# Patient Record
Sex: FEMALE | Race: OTHER | Employment: UNEMPLOYED | URBAN - METROPOLITAN AREA
[De-identification: names, ages, dates, MRNs, and addresses within clinical notes are randomized per-mention and may not be internally consistent; named-entity substitution may affect disease eponyms.]

---

## 2007-08-31 LAB — PAP SMEAR, EXTERNAL: NEGATIVE

## 2019-01-07 LAB — HBA1C MFR BLD HPLC: 5.9 %

## 2019-11-06 ENCOUNTER — OFFICE VISIT (OUTPATIENT)
Dept: FAMILY MEDICINE CLINIC | Age: 64
End: 2019-11-06

## 2019-11-06 VITALS
SYSTOLIC BLOOD PRESSURE: 158 MMHG | BODY MASS INDEX: 23.05 KG/M2 | WEIGHT: 135 LBS | RESPIRATION RATE: 18 BRPM | OXYGEN SATURATION: 98 % | TEMPERATURE: 98 F | DIASTOLIC BLOOD PRESSURE: 93 MMHG | HEART RATE: 65 BPM | HEIGHT: 64 IN

## 2019-11-06 DIAGNOSIS — Z12.31 ENCOUNTER FOR SCREENING MAMMOGRAM FOR MALIGNANT NEOPLASM OF BREAST: ICD-10-CM

## 2019-11-06 DIAGNOSIS — I10 ESSENTIAL HYPERTENSION: Primary | ICD-10-CM

## 2019-11-06 DIAGNOSIS — R73.01 IFG (IMPAIRED FASTING GLUCOSE): ICD-10-CM

## 2019-11-06 DIAGNOSIS — E78.5 HYPERLIPIDEMIA, UNSPECIFIED HYPERLIPIDEMIA TYPE: ICD-10-CM

## 2019-11-06 RX ORDER — FENOFIBRATE 120 MG/1
145 TABLET ORAL
COMMUNITY
End: 2019-11-08

## 2019-11-06 RX ORDER — TELMISARTAN 20 MG/1
TABLET ORAL DAILY
COMMUNITY
End: 2019-11-06 | Stop reason: SDUPTHER

## 2019-11-06 RX ORDER — AMOXICILLIN 500 MG/1
CAPSULE ORAL
COMMUNITY
Start: 2019-10-30 | End: 2020-05-28 | Stop reason: ALTCHOICE

## 2019-11-06 NOTE — PROGRESS NOTES
Chief Complaint   Patient presents with    Hypertension    Cholesterol Problem    Establish Care    Labs     requested     HISTORY OF PRESENT ILLNESS  Homer Paz is a 71 y.o. female. HPI  Pt here to establish. Pt has hx of htn, hld. Pt requests labs. Pt needs an order for a mammogram.  She will go to [facility] No refills needed today. ROS  Review of Systems   Constitutional: Negative. HENT: Negative. Respiratory: Negative. Cardiovascular: Negative. All other systems reviewed and are negative. Physical Exam  Physical Exam   Nursing note and vitals reviewed. Constitutional: She is oriented to person, place, and time. She appears well-developed and well-nourished. HENT:   Head: Normocephalic and atraumatic. Right Ear: External ear normal.   Left Ear: External ear normal.   Nose: Nose normal.   Eyes: Conjunctivae and EOM are normal.   Neck: Normal range of motion. Neck supple. No JVD present. Carotid bruit is not present. No thyromegaly present. Cardiovascular: Normal rate, regular rhythm, normal heart sounds and intact distal pulses. Exam reveals no gallop and no friction rub. No murmur heard. Pulmonary/Chest: Effort normal and breath sounds normal. She has no wheezes. She has no rhonchi. She has no rales. Abdominal: Soft. Bowel sounds are normal.   Musculoskeletal: Normal range of motion. Neurological: She is alert and oriented to person, place, and time. Coordination normal.   Skin: Skin is warm and dry. Psychiatric: She has a normal mood and affect. Her behavior is normal. Judgment and thought content normal.     ASSESSMENT and PLAN  Diagnoses and all orders for this visit:    1. Essential hypertension  -     LIPID PANEL; Future  -     HEMOGLOBIN A1C WITH EAG; Future    2. Hyperlipidemia, unspecified hyperlipidemia type  -     METABOLIC PANEL, COMPREHENSIVE; Future  -     LIPID PANEL; Future    3.  Encounter for screening mammogram for malignant neoplasm of breast  -     JACOB 3D JHON W MAMMO BI SCREENING INCL CAD; Future    4. IFG (impaired fasting glucose)  -     METABOLIC PANEL, COMPREHENSIVE; Future  -     LIPID PANEL; Future  -     HEMOGLOBIN A1C WITH EAG; Future      Follow-up and Dispositions    · Return in about 2 months (around 1/6/2020) for high blood pressure, high cholesterol, elevated fasting blood sugar, lab review.

## 2019-11-06 NOTE — PROGRESS NOTES
Nadya Cruz presents today for   Chief Complaint   Patient presents with    Hypertension    Cholesterol Problem    Establish Care    Labs     requested       Is someone accompanying this pt? no    Is the patient using any DME equipment during OV? no    Depression Screening:  3 most recent PHQ Screens 11/6/2019   Little interest or pleasure in doing things Not at all   Feeling down, depressed, irritable, or hopeless Not at all   Total Score PHQ 2 0       Learning Assessment:  Learning Assessment 11/6/2019   PRIMARY LEARNER Patient   HIGHEST LEVEL OF EDUCATION - PRIMARY LEARNER  SOME COLLEGE   BARRIERS PRIMARY LEARNER NONE   CO-LEARNER CAREGIVER No   PRIMARY LANGUAGE ENGLISH   LEARNER PREFERENCE PRIMARY LISTENING   ANSWERED BY self   RELATIONSHIP SELF       Abuse Screening:  Abuse Screening Questionnaire 11/6/2019   Do you ever feel afraid of your partner? N   Are you in a relationship with someone who physically or mentally threatens you? N   Is it safe for you to go home? Y       Fall Screening  Fall Risk Assessment, last 12 mths 11/6/2019   Able to walk? Yes   Fall in past 12 months? No       Generalized Anxiety  No flowsheet data found. Health Maintenance Due   Topic Date Due    Hepatitis C Screening  1955    DTaP/Tdap/Td series (1 - Tdap) 10/22/1975    PAP AKA CERVICAL CYTOLOGY  10/22/1975    Shingrix Vaccine Age 50> (1 of 2) 10/22/2004    FOBT Q 1 YEAR AGE 50-75  10/22/2004    BREAST CANCER SCRN MAMMOGRAM  03/06/2017    Influenza Age 9 to Adult  08/01/2019    GLAUCOMA SCREENING Q2Y  10/22/2019    Bone Densitometry (Dexa) Screening  10/22/2019    Pneumococcal 65+ years (1 of 1 - PPSV23) 10/22/2019   . Health Maintenance reviewed and discussed and ordered per Provider. Nadya Cruz is updated on all     Coordination of Care  1. Have you been to the ER, urgent care clinic since your last visit? Hospitalized since your last visit? no    2.  Have you seen or consulted any other health care providers outside of the [address] [place] [address] since your last visit? Include any pap smears or colon screening. no      Advance Directive:  1. Do you have an advance directive in place? Patient Reply:no    2. If not, would you like material regarding how to put one in place?  Patient Reply: no

## 2019-11-06 NOTE — TELEPHONE ENCOUNTER
PCP: Mary Ellen Mike MD    Last appt: 11/6/2019  Future Appointments   Date Time Provider Lisandro Duran   1/9/2020 11:15 AM Mary Ellen Mike MD [facility] None       Requested Prescriptions     Pending Prescriptions Disp Refills    telmisartan (MICARDIS) 20 mg tablet       Sig: Take  by mouth daily.  Fenofibrate 120 mg tab       Sig: Take 145 mg by mouth.

## 2019-11-07 ENCOUNTER — HOSPITAL ENCOUNTER (OUTPATIENT)
Dept: LAB | Age: 64
Discharge: HOME | End: 2019-11-07
Payer: MEDICARE

## 2019-11-07 DIAGNOSIS — I10 ESSENTIAL HYPERTENSION: ICD-10-CM

## 2019-11-07 DIAGNOSIS — E78.5 HYPERLIPIDEMIA, UNSPECIFIED HYPERLIPIDEMIA TYPE: ICD-10-CM

## 2019-11-07 DIAGNOSIS — R73.01 IFG (IMPAIRED FASTING GLUCOSE): ICD-10-CM

## 2019-11-07 LAB
ALBUMIN SERPL-MCNC: 4 G/DL (ref 3.4–5)
ALBUMIN/GLOB SERPL: 1.3 {RATIO} (ref 0.8–1.7)
ALP SERPL-CCNC: 84 U/L (ref 45–117)
ALT SERPL-CCNC: 34 U/L (ref 13–56)
ANION GAP SERPL CALC-SCNC: 3 MMOL/L (ref 3–18)
AST SERPL-CCNC: 28 U/L (ref 10–38)
BILIRUB SERPL-MCNC: 0.4 MG/DL (ref 0.2–1)
BUN SERPL-MCNC: 26 MG/DL (ref 7–18)
BUN/CREAT SERPL: 24 (ref 12–20)
CALCIUM SERPL-MCNC: 10 MG/DL (ref 8.5–10.1)
CHLORIDE SERPL-SCNC: 112 MMOL/L (ref 100–111)
CHOLEST SERPL-MCNC: 193 MG/DL
CO2 SERPL-SCNC: 29 MMOL/L (ref 21–32)
CREAT SERPL-MCNC: 1.09 MG/DL (ref 0.6–1.3)
EST. AVERAGE GLUCOSE BLD GHB EST-MCNC: 126 MG/DL
GLOBULIN SER CALC-MCNC: 3.1 G/DL (ref 2–4)
GLUCOSE SERPL-MCNC: 95 MG/DL (ref 74–99)
HBA1C MFR BLD: 6 % (ref 4.2–5.6)
HDLC SERPL-MCNC: 49 MG/DL (ref 40–60)
HDLC SERPL: 3.9 {RATIO} (ref 0–5)
LDLC SERPL CALC-MCNC: 114.8 MG/DL (ref 0–100)
LIPID PROFILE,FLP: ABNORMAL
POTASSIUM SERPL-SCNC: 5.3 MMOL/L (ref 3.5–5.5)
PROT SERPL-MCNC: 7.1 G/DL (ref 6.4–8.2)
SODIUM SERPL-SCNC: 144 MMOL/L (ref 136–145)
TRIGL SERPL-MCNC: 146 MG/DL (ref ?–150)
VLDLC SERPL CALC-MCNC: 29.2 MG/DL

## 2019-11-07 PROCEDURE — 80061 LIPID PANEL: CPT

## 2019-11-07 PROCEDURE — 83036 HEMOGLOBIN GLYCOSYLATED A1C: CPT

## 2019-11-07 PROCEDURE — 36415 COLL VENOUS BLD VENIPUNCTURE: CPT

## 2019-11-07 PROCEDURE — 80053 COMPREHEN METABOLIC PANEL: CPT

## 2019-11-08 RX ORDER — FENOFIBRATE 145 MG/1
145 TABLET, COATED ORAL DAILY
Qty: 90 TAB | Refills: 1 | Status: SHIPPED | OUTPATIENT
Start: 2019-11-08 | End: 2020-05-28 | Stop reason: SDUPTHER

## 2019-11-08 RX ORDER — FENOFIBRATE 120 MG/1
145 TABLET ORAL
Status: CANCELLED | OUTPATIENT
Start: 2019-11-08

## 2019-11-08 RX ORDER — TELMISARTAN 20 MG/1
20 TABLET ORAL DAILY
Qty: 90 TAB | Refills: 1 | Status: SHIPPED | OUTPATIENT
Start: 2019-11-08 | End: 2020-05-28 | Stop reason: SDUPTHER

## 2020-01-09 ENCOUNTER — HOSPITAL ENCOUNTER (OUTPATIENT)
Dept: LAB | Age: 65
Discharge: HOME | End: 2020-01-09
Payer: MEDICARE

## 2020-01-09 ENCOUNTER — OFFICE VISIT (OUTPATIENT)
Dept: FAMILY MEDICINE CLINIC | Age: 65
End: 2020-01-09

## 2020-01-09 VITALS
DIASTOLIC BLOOD PRESSURE: 79 MMHG | HEART RATE: 81 BPM | HEIGHT: 64 IN | WEIGHT: 135.2 LBS | BODY MASS INDEX: 23.08 KG/M2 | SYSTOLIC BLOOD PRESSURE: 146 MMHG | TEMPERATURE: 97.8 F | RESPIRATION RATE: 18 BRPM

## 2020-01-09 DIAGNOSIS — R25.2 LEG CRAMPING: ICD-10-CM

## 2020-01-09 DIAGNOSIS — E78.5 HYPERLIPIDEMIA, UNSPECIFIED HYPERLIPIDEMIA TYPE: ICD-10-CM

## 2020-01-09 DIAGNOSIS — Z23 ENCOUNTER FOR IMMUNIZATION: ICD-10-CM

## 2020-01-09 DIAGNOSIS — R73.01 IFG (IMPAIRED FASTING GLUCOSE): ICD-10-CM

## 2020-01-09 DIAGNOSIS — I10 ESSENTIAL HYPERTENSION: ICD-10-CM

## 2020-01-09 DIAGNOSIS — R73.01 IFG (IMPAIRED FASTING GLUCOSE): Primary | ICD-10-CM

## 2020-01-09 LAB
ANION GAP SERPL CALC-SCNC: 5 MMOL/L (ref 3–18)
BUN SERPL-MCNC: 21 MG/DL (ref 7–18)
BUN/CREAT SERPL: 21 (ref 12–20)
CALCIUM SERPL-MCNC: 10.4 MG/DL (ref 8.5–10.1)
CHLORIDE SERPL-SCNC: 109 MMOL/L (ref 100–111)
CO2 SERPL-SCNC: 29 MMOL/L (ref 21–32)
CREAT SERPL-MCNC: 1 MG/DL (ref 0.6–1.3)
GLUCOSE SERPL-MCNC: 88 MG/DL (ref 74–99)
POTASSIUM SERPL-SCNC: 4.7 MMOL/L (ref 3.5–5.5)
SODIUM SERPL-SCNC: 143 MMOL/L (ref 136–145)

## 2020-01-09 PROCEDURE — 36415 COLL VENOUS BLD VENIPUNCTURE: CPT

## 2020-01-09 PROCEDURE — 80048 BASIC METABOLIC PNL TOTAL CA: CPT

## 2020-01-09 RX ORDER — ASCORBIC ACID 250 MG
TABLET ORAL
COMMUNITY
End: 2021-06-01 | Stop reason: ALTCHOICE

## 2020-01-09 RX ORDER — AA/PROT/LYSINE/METHIO/VIT C/B6 50-12.5 MG
100 TABLET ORAL
COMMUNITY

## 2020-01-09 RX ORDER — BISMUTH SUBSALICYLATE 262 MG
1 TABLET,CHEWABLE ORAL DAILY
COMMUNITY

## 2020-01-09 NOTE — PATIENT INSTRUCTIONS
Vaccine Information Statement    Influenza (Flu) Vaccine (Inactivated or Recombinant): What You Need to Know    Many Vaccine Information Statements are available in Vietnamese and other languages. See www.immunize.org/vis  Hojas de información sobre vacunas están disponibles en español y en muchos otros idiomas. Visite www.immunize.org/vis    1. Why get vaccinated? Influenza vaccine can prevent influenza (flu). Flu is a contagious disease that spreads around the [facility] every year, usually between October and May. Anyone can get the flu, but it is more dangerous for some people. Infants and young children, people 72years of age and older, pregnant women, and people with certain health conditions or a weakened immune system are at greatest risk of flu complications. Pneumonia, bronchitis, sinus infections and ear infections are examples of flu-related complications. If you have a medical condition, such as heart disease, cancer or diabetes, flu can make it worse. Flu can cause fever and chills, sore throat, muscle aches, fatigue, cough, headache, and runny or stuffy nose. Some people may have vomiting and diarrhea, though this is more common in children than adults. Each year thousands of people in the [facility] die from flu, and many more are hospitalized. Flu vaccine prevents millions of illnesses and flu-related visits to the doctor each year. 2. Influenza vaccines     CDC recommends everyone 10months of age and older get vaccinated every flu season. Children 6 months through 6years of age may need 2 doses during a single flu season. Everyone else needs only 1 dose each flu season. It takes about 2 weeks for protection to develop after vaccination. There are many flu viruses, and they are always changing. Each year a new flu vaccine is made to protect against three or four viruses that are likely to cause disease in the upcoming flu season.  Even when the vaccine doesnt exactly match these viruses, it may still provide some protection. Influenza vaccine does not cause flu. Influenza vaccine may be given at the same time as other vaccines. 3. Talk with your health care provider    Tell your vaccine provider if the person getting the vaccine:   Has had an allergic reaction after a previous dose of influenza vaccine, or has any severe, life-threatening allergies.  Has ever had Guillain-Barré Syndrome (also called GBS). In some cases, your health care provider may decide to postpone influenza vaccination to a future visit. People with minor illnesses, such as a cold, may be vaccinated. People who are moderately or severely ill should usually wait until they recover before getting influenza vaccine. Your health care provider can give you more information. 4. Risks of a reaction     Soreness, redness, and swelling where shot is given, fever, muscle aches, and headache can happen after influenza vaccine.  There may be a very small increased risk of Guillain-Barré Syndrome (GBS) after inactivated influenza vaccine (the flu shot). Vilma Paul children who get the flu shot along with pneumococcal vaccine (PCV13), and/or DTaP vaccine at the same time might be slightly more likely to have a seizure caused by fever. Tell your health care provider if a child who is getting flu vaccine has ever had a seizure. People sometimes faint after medical procedures, including vaccination. Tell your provider if you feel dizzy or have vision changes or ringing in the ears. As with any medicine, there is a very remote chance of a vaccine causing a severe allergic reaction, other serious injury, or death. 5. What if there is a serious problem? An allergic reaction could occur after the vaccinated person leaves the clinic.  If you see signs of a severe allergic reaction (hives, swelling of the face and throat, difficulty breathing, a fast heartbeat, dizziness, or weakness), call 9-1-1 and get the person to the nearest hospital.    For other signs that concern you, call your health care provider. Adverse reactions should be reported to the Vaccine Adverse Event Reporting System (VAERS). Your health care provider will usually file this report, or you can do it yourself. Visit the VAERS website at www.vaers. [facility].gov or call 5-463.821.2871. VAERS is only for reporting reactions, and VAERS staff do not give medical advice. 6. The National Vaccine Injury Compensation Program    The formerly [facility] Vaccine Injury Compensation Program (VICP) is a federal program that was created to compensate people who may have been injured by certain vaccines. Visit the VICP website at www.hrsa.gov/vaccinecompensation or call 8-324.932.8358 to learn about the program and about filing a claim. There is a time limit to file a claim for compensation. 7. How can I learn more?  Ask your health care provider.  Call your local or state health department.  Contact the Centers for Disease Control and Prevention (CDC):  - Call 6-980.663.3038 (1-800-CDC-INFO) or  - Visit CDCs influenza website at www.cdc.gov/flu    Vaccine Information Statement (Interim)  Inactivated Influenza Vaccine   8/15/2019  [address] [id number]-[id number]   Department of Health and Human Services  Centers for Disease Control and Prevention    Office Use Only

## 2020-01-09 NOTE — PROGRESS NOTES
Chief Complaint   Patient presents with    Hypertension     follow up    Cholesterol Problem    Blood sugar problem    Labs     completed 11/7/19    Leg Pain     Patient stated that she been having right leg soreness. GLORIA Rico is a 71 y.o. female presenting today for 2 months  follow up of htn, hld, ifg.    Patient had labs on 11/7/19. Labs reviewed in detail with patient     Patient does not need medication refills today. New concerns today: pt reports that she had cramps in one leg about 2 wks ago. It did go away but the muscles were sore for a few days afterwards. ROS  Review of Systems   Constitutional: Negative. HENT: Negative. Respiratory: Negative. Cardiovascular: Negative. All other systems reviewed and are negative. Physical Exam  Physical Exam   Nursing note and vitals reviewed. Constitutional: She is oriented to person, place, and time. She appears well-developed and well-nourished. HENT:   Head: Normocephalic and atraumatic. Right Ear: External ear normal.   Left Ear: External ear normal.   Nose: Nose normal.   Eyes: Conjunctivae and EOM are normal.   Neck: Normal range of motion. Neck supple. No JVD present. Carotid bruit is not present. No thyromegaly present. Cardiovascular: Normal rate, regular rhythm, normal heart sounds and intact distal pulses. Exam reveals no gallop and no friction rub. No murmur heard. Pulmonary/Chest: Effort normal and breath sounds normal. She has no wheezes. She has no rhonchi. She has no rales. Abdominal: Soft. Bowel sounds are normal.   Musculoskeletal: Normal range of motion. Neurological: She is alert and oriented to person, place, and time. Coordination normal.   Skin: Skin is warm and dry. Psychiatric: She has a normal mood and affect. Her behavior is normal. Judgment and thought content normal.     Diagnoses and all orders for this visit:    1.  IFG (impaired fasting glucose)  -     METABOLIC PANEL, BASIC; Future  Stable. Continue present treatment plan    2. Essential hypertension  -     METABOLIC PANEL, BASIC; Future  Stable. Continue present treatment plan    3. Hyperlipidemia, unspecified hyperlipidemia type  -     METABOLIC PANEL, BASIC; Future  Stable. Continue present treatment plan    4. Encounter for immunization  -     INFLUENZA VACCINE INACTIVATED (IIV), SUBUNIT, ADJUVANTED, IM  -     ADMIN INFLUENZA VIRUS VAC    5. Leg cramping  Resolved  -     METABOLIC PANEL, BASIC; Future      Follow-up and Dispositions    · Return in about 3 months (around 4/9/2020) for elevated fasting blood sugar, high blood pressure, high cholesterol.

## 2020-01-09 NOTE — PROGRESS NOTES
[facility] presents today for   Chief Complaint   Patient presents with    Hypertension     follow up    Cholesterol Problem    Blood sugar problem    Labs     completed 11/7/19    Leg Pain     Patient stated that she been having right leg soreness. Is someone accompanying this pt? no    Is the patient using any DME equipment during [address]? no    Depression Screening:  3 most recent PHQ Screens 1/9/2020   Little interest or pleasure in doing things Not at all   Feeling down, depressed, irritable, or hopeless Not at all   Total Score PHQ 2 0       Learning Assessment:  Learning Assessment 1/9/2020   PRIMARY LEARNER Patient   HIGHEST LEVEL OF EDUCATION - PRIMARY LEARNER  SOME COLLEGE   BARRIERS PRIMARY LEARNER NONE   CO-LEARNER CAREGIVER No   PRIMARY LANGUAGE ENGLISH   LEARNER PREFERENCE PRIMARY LISTENING   ANSWERED BY self   RELATIONSHIP SELF       Abuse Screening:  Abuse Screening Questionnaire 1/9/2020   Do you ever feel afraid of your partner? N   Are you in a relationship with someone who physically or mentally threatens you? N   Is it safe for you to go home? Y       Fall Screening  Fall Risk Assessment, last 12 mths 1/9/2020   Able to walk? Yes   Fall in past 12 months? No       Generalized Anxiety  No flowsheet data found. Health Maintenance Due   Topic Date Due    Hepatitis C Screening  1955    COLONOSCOPY  10/22/1972    Shingrix Vaccine Age 50> (1 of 2) 10/22/2004    Influenza Age 5 to Adult  08/01/2019    GLAUCOMA SCREENING Q2Y  10/22/2019    Bone Densitometry (Dexa) Screening  10/22/2019    Pneumococcal 65+ years (1 of 1 - PPSV23) 10/22/2019    MEDICARE YEARLY EXAM  11/06/2019   . Health Maintenance reviewed and discussed and ordered per Provider. [facility] is updated on all     Coordination of Care  1. Have you been to the ER, urgent care clinic since your last visit? Hospitalized since your last visit? no    2.  Have you seen or consulted any other health care providers outside of the [address] [place] [address] since your last visit? Include any pap smears or colon screening. no      Advance Directive:  1. Do you have an advance directive in place? Patient Reply:no    2. If not, would you like material regarding how to put one in place? Patient Reply: farida    Felice Charleston 1955 female who presents for routine immunizations. Patient denies any symptoms , reactions or allergies that would exclude them from being immunized today. Risks and adverse reactions were discussed and the VIS was given to them. All questions were addressed. Order placed for HD Flu,  per Verbal Order from Dr. Wally Hooper with read back. Patient was observed for 15 min post injection. There were no reactions observed.     Kathi Roche

## 2020-01-29 ENCOUNTER — PATIENT MESSAGE (OUTPATIENT)
Dept: FAMILY MEDICINE CLINIC | Age: 65
End: 2020-01-29

## 2020-02-04 NOTE — TELEPHONE ENCOUNTER
From: Candy Ace  To: Elin Thorpe MD  Sent: 1/29/2020 5:49 PM EST  Subject: Test Results Question    I had additional blood work done at my last visit because my electrolytes were high. Will the results be posted on here? Thank you.

## 2020-05-26 RX ORDER — TELMISARTAN 20 MG/1
20 TABLET ORAL DAILY
Qty: 90 TAB | Refills: 1 | Status: CANCELLED | OUTPATIENT
Start: 2020-05-26

## 2020-05-26 RX ORDER — FENOFIBRATE 145 MG/1
145 TABLET, COATED ORAL DAILY
Qty: 90 TAB | Refills: 1 | Status: CANCELLED | OUTPATIENT
Start: 2020-05-26

## 2020-05-26 NOTE — TELEPHONE ENCOUNTER
Pt called requesting refill for medication . Requested Prescriptions     Pending Prescriptions Disp Refills    telmisartan (MICARDIS) 20 mg tablet 90 Tab 1     Sig: Take 1 Tab by mouth daily.  fenofibrate nanocrystallized (TRICOR) 145 mg tablet 90 Tab 1     Sig: Take 1 Tab by mouth daily.     Pt would like printed copy and would like 90 day supply

## 2020-05-28 ENCOUNTER — VIRTUAL VISIT (OUTPATIENT)
Dept: FAMILY MEDICINE CLINIC | Age: 65
End: 2020-05-28

## 2020-05-28 DIAGNOSIS — N95.9 MENOPAUSAL AND PERIMENOPAUSAL DISORDER: ICD-10-CM

## 2020-05-28 DIAGNOSIS — Z11.59 ENCOUNTER FOR HEPATITIS C SCREENING TEST FOR LOW RISK PATIENT: ICD-10-CM

## 2020-05-28 DIAGNOSIS — I10 ESSENTIAL HYPERTENSION: ICD-10-CM

## 2020-05-28 DIAGNOSIS — E78.5 HYPERLIPIDEMIA, UNSPECIFIED HYPERLIPIDEMIA TYPE: ICD-10-CM

## 2020-05-28 DIAGNOSIS — R73.01 IFG (IMPAIRED FASTING GLUCOSE): Primary | ICD-10-CM

## 2020-05-28 RX ORDER — FENOFIBRATE 145 MG/1
145 TABLET, COATED ORAL DAILY
Qty: 90 TAB | Refills: 1 | Status: SHIPPED | OUTPATIENT
Start: 2020-05-28 | End: 2020-12-02 | Stop reason: SDUPTHER

## 2020-05-28 RX ORDER — TELMISARTAN 20 MG/1
20 TABLET ORAL DAILY
Qty: 90 TAB | Refills: 1 | Status: SHIPPED | OUTPATIENT
Start: 2020-05-28 | End: 2020-11-12 | Stop reason: SDUPTHER

## 2020-05-28 NOTE — PROGRESS NOTES
Reed Vásquez presents today for   Chief Complaint   Patient presents with    Blood sugar problem     elevated fasting blood glucose    Hypertension    Cholesterol Problem     high chol       Is someone accompanying this pt? no    Is the patient using any DME equipment during OV? no    Depression Screening:  3 most recent PHQ Screens 1/9/2020   Little interest or pleasure in doing things Not at all   Feeling down, depressed, irritable, or hopeless Not at all   Total Score PHQ 2 0       Learning Assessment:  Learning Assessment 1/9/2020   PRIMARY LEARNER Patient   HIGHEST LEVEL OF EDUCATION - PRIMARY LEARNER  SOME COLLEGE   BARRIERS PRIMARY LEARNER NONE   CO-LEARNER CAREGIVER No   PRIMARY LANGUAGE ENGLISH   LEARNER PREFERENCE PRIMARY LISTENING   ANSWERED BY self   RELATIONSHIP SELF       Abuse Screening:  Abuse Screening Questionnaire 1/9/2020   Do you ever feel afraid of your partner? N   Are you in a relationship with someone who physically or mentally threatens you? N   Is it safe for you to go home? Y       Fall Screening  Fall Risk Assessment, last 12 mths 1/9/2020   Able to walk? Yes   Fall in past 12 months? No       Generalized Anxiety  No flowsheet data found. Health Maintenance Due   Topic Date Due    Hepatitis C Screening  1955    Colonoscopy  10/22/1972    Shingrix Vaccine Age 50> (1 of 2) 10/22/2004    GLAUCOMA SCREENING Q2Y  10/22/2019    Bone Densitometry (Dexa) Screening  10/22/2019    Pneumococcal 65+ years (1 of 1 - PPSV23) 10/22/2019    Medicare Yearly Exam  11/06/2019   . Health Maintenance reviewed and discussed and ordered per Provider. Coordination of Care  1. Have you been to the ER, urgent care clinic since your last visit? Hospitalized since your last visit? no    2. Have you seen or consulted any other health care providers outside of the [address] [place] [address] since your last visit? Include any pap smears or colon screening.  no      Advance Directive: Discussed 1/9/20

## 2020-05-28 NOTE — PROGRESS NOTES
Renee Valdez is a 71 y.o. female who was seen by synchronous (real-time) audio-video technology on 5/28/2020. Consent: Renee Valdez, who was seen by synchronous (real-time) audio-video technology, and/or her healthcare decision maker, is aware that this patient-initiated, Telehealth encounter on 5/28/2020 is a billable service, with coverage as determined by her insurance carrier. She is aware that she may receive a bill and has provided verbal consent to proceed: Yes. Assessment & Plan:   Diagnoses and all orders for this visit:    1. IFG (impaired fasting glucose)  -     METABOLIC PANEL, COMPREHENSIVE; Future  -     LIPID PANEL; Future  -     HEMOGLOBIN A1C WITH EAG; Future  Stable, cont pres tx plan. 2. Essential hypertension  -     telmisartan (MICARDIS) 20 mg tablet; Take 1 Tab by mouth daily.  -     METABOLIC PANEL, COMPREHENSIVE; Future  -     LIPID PANEL; Future  Stable, cont pres tx plan. 3. Hyperlipidemia, unspecified hyperlipidemia type  -     fenofibrate nanocrystallized (TRICOR) 145 mg tablet; Take 1 Tab by mouth daily.  -     METABOLIC PANEL, COMPREHENSIVE; Future  -     LIPID PANEL; Future    4. Menopausal and perimenopausal disorder  -     DEXA BONE DENSITY STUDY AXIAL; Future    5. Encounter for hepatitis C screening test for low risk patient  -     HEPATITIS C AB; Future      The complexity of medical decision making for this visit is moderate   Follow-up and Dispositions    · Return in about 3 months (around 8/28/2020) for elevated fasting blood sugar, high blood pressure, high cholesterol. 712  Subjective:   Renee Valdez is a 71 y.o. female who was seen for Blood sugar problem (elevated fasting blood glucose); Hypertension; and Cholesterol Problem (high chol)    Patient contacted via doxy. me. Pt has been doing well. Her home bp readings have been normotensive. She does occasionally check bs reading and they are usually in the 80s.   They will occasionally get in the 90s; she tries to be careful with her diet. Pt does need med refills. Prior to Admission medications    Medication Sig Start Date End Date Taking? Authorizing Provider   fenofibrate nanocrystallized (TRICOR) 145 mg tablet Take 1 Tab by mouth daily. 5/28/20  Yes Teresita Oliva MD   telmisartan (MICARDIS) 20 mg tablet Take 1 Tab by mouth daily. 5/28/20  Yes Devorah Verdugo MD   coenzyme q10 (CO Q-10) 10 mg cap Take  by mouth. Yes Provider, Historical   multivitamin (ONE A DAY) tablet Take 1 Tab by mouth daily. Yes Provider, Historical   ascorbic acid, vitamin C, (VITAMIN C) 250 mg tablet Take  by mouth. Yes Provider, Historical   aspirin 81 mg tablet Take 81 mg by mouth. Yes Astrid, MD Isaiah   ACETAMINOPHEN (TYLENOL PO) Take  by mouth. Isaiah Resendiz MD   ibuprofen (ADVIL) 200 mg tablet Take 200 mg by mouth. Isaiah Resendiz MD   IBUPROFEN/DIPHENHYDRAMINE (ADVIL PM PO) Take  by mouth. Astrid, MD Isaiah     No Known Allergies    Patient Active Problem List   Diagnosis Code    Gastroenteritis K52.9    Dehydration E86.0     Current Outpatient Medications   Medication Sig Dispense Refill    fenofibrate nanocrystallized (TRICOR) 145 mg tablet Take 1 Tab by mouth daily. 90 Tab 1    telmisartan (MICARDIS) 20 mg tablet Take 1 Tab by mouth daily. 90 Tab 1    coenzyme q10 (CO Q-10) 10 mg cap Take  by mouth.  multivitamin (ONE A DAY) tablet Take 1 Tab by mouth daily.  ascorbic acid, vitamin C, (VITAMIN C) 250 mg tablet Take  by mouth.  aspirin 81 mg tablet Take 81 mg by mouth.  ACETAMINOPHEN (TYLENOL PO) Take  by mouth.  ibuprofen (ADVIL) 200 mg tablet Take 200 mg by mouth.  IBUPROFEN/DIPHENHYDRAMINE (ADVIL PM PO) Take  by mouth.          No Known Allergies  Past Medical History:   Diagnosis Date    High cholesterol     Hypertension      Past Surgical History:   Procedure Laterality Date    BREAST SURGERY PROCEDURE UNLISTED  2000    right lumpectomy    HX BLADDER SUSPENSION      HX GYN      hysterectomy    HX OOPHORECTOMY Left      Family History   Problem Relation Age of Onset    Dementia Mother     Hypertension Mother     High Cholesterol Mother     Other Mother         pre-dm    No Known Problems Son     No Known Problems Son     Other Father         homicide    Other Sister         AIDS    No Known Problems Brother     COPD Sister     No Known Problems Sister      Social History     Tobacco Use    Smoking status: Never Smoker    Smokeless tobacco: Never Used   Substance Use Topics    Alcohol use: Yes     Alcohol/week: 1.7 standard drinks     Types: 2 Glasses of wine per week     Frequency: Monthly or less     Drinks per session: 1 or 2     Binge frequency: Less than monthly       Review of Systems   Constitutional: Negative. HENT: Negative. Respiratory: Negative. Cardiovascular: Negative. All other systems reviewed and are negative. Objective: There were no vitals taken for this visit. General: alert, cooperative, no distress   Mental  status: normal mood, behavior, speech, dress, motor activity, and thought processes, able to follow commands   HENT: NCAT   Neck: no visualized mass   Resp: no respiratory distress   Neuro: no gross deficits   Skin: no discoloration or lesions of concern on visible areas   Psychiatric: normal affect, consistent with stated mood, no evidence of hallucinations     Additional exam findings: none    We discussed the expected course, resolution and complications of the diagnosis(es) in detail. Medication risks, benefits, costs, interactions, and alternatives were discussed as indicated. I advised her to contact the office if her condition worsens, changes or fails to improve as anticipated. She expressed understanding with the diagnosis(es) and plan. Tatyana Sharpe is a 71 y.o. female who was evaluated by a video visit encounter for concerns as above.  Patient identification was verified prior to start of the visit. A caregiver was present when appropriate. Due to this being a TeleHealth encounter (During XRPEV-46 public health emergency), evaluation of the following organ systems was limited: Vitals/Constitutional/EENT/Resp/CV/GI//MS/Neuro/Skin/Heme-Lymph-Imm. Pursuant to the emergency declaration under the [facility], [facility] waiver authority and the [employer] and [employer] Act, this Virtual  Visit was conducted, with patient's (and/or legal guardian's) consent, to reduce the patient's risk of exposure to COVID-19 and provide necessary medical care. Services were provided through a video synchronous discussion virtually to substitute for in-person clinic visit. Patient and provider were located at their individual homes.       Archana Gonzalez MD

## 2020-05-29 ENCOUNTER — HOSPITAL ENCOUNTER (OUTPATIENT)
Dept: LAB | Age: 65
Discharge: HOME | End: 2020-05-29
Payer: MEDICARE

## 2020-05-29 DIAGNOSIS — R73.01 IFG (IMPAIRED FASTING GLUCOSE): ICD-10-CM

## 2020-05-29 DIAGNOSIS — I10 ESSENTIAL HYPERTENSION: ICD-10-CM

## 2020-05-29 DIAGNOSIS — Z11.59 ENCOUNTER FOR HEPATITIS C SCREENING TEST FOR LOW RISK PATIENT: ICD-10-CM

## 2020-05-29 DIAGNOSIS — E78.5 HYPERLIPIDEMIA, UNSPECIFIED HYPERLIPIDEMIA TYPE: ICD-10-CM

## 2020-05-29 LAB
ALBUMIN SERPL-MCNC: 4.1 G/DL (ref 3.4–5)
ALBUMIN/GLOB SERPL: 1.3 {RATIO} (ref 0.8–1.7)
ALP SERPL-CCNC: 96 U/L (ref 45–117)
ALT SERPL-CCNC: 34 U/L (ref 13–56)
ANION GAP SERPL CALC-SCNC: 4 MMOL/L (ref 3–18)
AST SERPL-CCNC: 27 U/L (ref 10–38)
BILIRUB SERPL-MCNC: 0.4 MG/DL (ref 0.2–1)
BUN SERPL-MCNC: 20 MG/DL (ref 7–18)
BUN/CREAT SERPL: 18 (ref 12–20)
CALCIUM SERPL-MCNC: 9.5 MG/DL (ref 8.5–10.1)
CHLORIDE SERPL-SCNC: 110 MMOL/L (ref 100–111)
CHOLEST SERPL-MCNC: 204 MG/DL
CO2 SERPL-SCNC: 29 MMOL/L (ref 21–32)
CREAT SERPL-MCNC: 1.09 MG/DL (ref 0.6–1.3)
EST. AVERAGE GLUCOSE BLD GHB EST-MCNC: 128 MG/DL
GLOBULIN SER CALC-MCNC: 3.1 G/DL (ref 2–4)
GLUCOSE SERPL-MCNC: 93 MG/DL (ref 74–99)
HBA1C MFR BLD: 6.1 % (ref 4.2–5.6)
HCV AB SER IA-ACNC: 0.14 INDEX
HCV AB SERPL QL IA: NEGATIVE
HCV COMMENT,HCGAC: NORMAL
HDLC SERPL-MCNC: 49 MG/DL (ref 40–60)
HDLC SERPL: 4.2 {RATIO} (ref 0–5)
LDLC SERPL CALC-MCNC: 124.2 MG/DL (ref 0–100)
LIPID PROFILE,FLP: ABNORMAL
POTASSIUM SERPL-SCNC: 4.1 MMOL/L (ref 3.5–5.5)
PROT SERPL-MCNC: 7.2 G/DL (ref 6.4–8.2)
SODIUM SERPL-SCNC: 143 MMOL/L (ref 136–145)
TRIGL SERPL-MCNC: 154 MG/DL (ref ?–150)
VLDLC SERPL CALC-MCNC: 30.8 MG/DL

## 2020-05-29 PROCEDURE — 80053 COMPREHEN METABOLIC PANEL: CPT

## 2020-05-29 PROCEDURE — 80061 LIPID PANEL: CPT

## 2020-05-29 PROCEDURE — 86803 HEPATITIS C AB TEST: CPT

## 2020-05-29 PROCEDURE — 36415 COLL VENOUS BLD VENIPUNCTURE: CPT

## 2020-05-29 PROCEDURE — 83036 HEMOGLOBIN GLYCOSYLATED A1C: CPT

## 2020-06-16 ENCOUNTER — HOSPITAL ENCOUNTER (OUTPATIENT)
Dept: BONE DENSITY | Age: 65
Discharge: HOME | End: 2020-06-16
Attending: FAMILY MEDICINE
Payer: MEDICARE

## 2020-06-16 DIAGNOSIS — N95.9 MENOPAUSAL AND PERIMENOPAUSAL DISORDER: ICD-10-CM

## 2020-06-16 PROCEDURE — 77080 DXA BONE DENSITY AXIAL: CPT

## 2020-11-12 DIAGNOSIS — R73.01 IFG (IMPAIRED FASTING GLUCOSE): Primary | ICD-10-CM

## 2020-11-12 DIAGNOSIS — I10 ESSENTIAL HYPERTENSION: ICD-10-CM

## 2020-11-12 DIAGNOSIS — E78.5 HYPERLIPIDEMIA, UNSPECIFIED HYPERLIPIDEMIA TYPE: ICD-10-CM

## 2020-11-12 NOTE — TELEPHONE ENCOUNTER
Pt called requesting refill for medication . Requested Prescriptions     Pending Prescriptions Disp Refills    telmisartan (MICARDIS) 20 mg tablet 90 Tab 1     Sig: Take 1 Tab by mouth daily. Pt also has and follow up appt on scheduled on Dec 2nd with SINGH Lemus and would like to have blood work ordered. Please advise.

## 2020-11-13 RX ORDER — TELMISARTAN 20 MG/1
20 TABLET ORAL DAILY
Qty: 90 TAB | Refills: 1 | Status: SHIPPED | OUTPATIENT
Start: 2020-11-13 | End: 2021-05-10 | Stop reason: SDUPTHER

## 2020-11-16 ENCOUNTER — HOSPITAL ENCOUNTER (OUTPATIENT)
Dept: LAB | Age: 65
Discharge: HOME | End: 2020-11-16
Payer: MEDICARE

## 2020-11-16 DIAGNOSIS — R73.01 IFG (IMPAIRED FASTING GLUCOSE): ICD-10-CM

## 2020-11-16 DIAGNOSIS — E78.5 HYPERLIPIDEMIA, UNSPECIFIED HYPERLIPIDEMIA TYPE: ICD-10-CM

## 2020-11-16 DIAGNOSIS — I10 ESSENTIAL HYPERTENSION: ICD-10-CM

## 2020-11-16 LAB
ALBUMIN SERPL-MCNC: 4.2 G/DL (ref 3.4–5)
ALBUMIN/GLOB SERPL: 1.3 {RATIO} (ref 0.8–1.7)
ALP SERPL-CCNC: 99 U/L (ref 45–117)
ALT SERPL-CCNC: 32 U/L (ref 13–56)
ANION GAP SERPL CALC-SCNC: 5 MMOL/L (ref 3–18)
AST SERPL-CCNC: 20 U/L (ref 10–38)
BILIRUB SERPL-MCNC: 0.5 MG/DL (ref 0.2–1)
BUN SERPL-MCNC: 26 MG/DL (ref 7–18)
BUN/CREAT SERPL: 23 (ref 12–20)
CALCIUM SERPL-MCNC: 10 MG/DL (ref 8.5–10.1)
CHLORIDE SERPL-SCNC: 111 MMOL/L (ref 100–111)
CHOLEST SERPL-MCNC: 225 MG/DL
CO2 SERPL-SCNC: 28 MMOL/L (ref 21–32)
CREAT SERPL-MCNC: 1.12 MG/DL (ref 0.6–1.3)
EST. AVERAGE GLUCOSE BLD GHB EST-MCNC: 126 MG/DL
GLOBULIN SER CALC-MCNC: 3.3 G/DL (ref 2–4)
GLUCOSE SERPL-MCNC: 93 MG/DL (ref 74–99)
HBA1C MFR BLD: 6 % (ref 4.2–5.6)
HDLC SERPL-MCNC: 47 MG/DL (ref 40–60)
HDLC SERPL: 4.8 {RATIO} (ref 0–5)
LDLC SERPL CALC-MCNC: 138.2 MG/DL (ref 0–100)
LIPID PROFILE,FLP: ABNORMAL
POTASSIUM SERPL-SCNC: 4.4 MMOL/L (ref 3.5–5.5)
PROT SERPL-MCNC: 7.5 G/DL (ref 6.4–8.2)
SODIUM SERPL-SCNC: 144 MMOL/L (ref 136–145)
TRIGL SERPL-MCNC: 199 MG/DL (ref ?–150)
VLDLC SERPL CALC-MCNC: 39.8 MG/DL

## 2020-11-16 PROCEDURE — 36415 COLL VENOUS BLD VENIPUNCTURE: CPT

## 2020-11-16 PROCEDURE — 80053 COMPREHEN METABOLIC PANEL: CPT

## 2020-11-16 PROCEDURE — 80061 LIPID PANEL: CPT

## 2020-11-16 PROCEDURE — 83036 HEMOGLOBIN GLYCOSYLATED A1C: CPT

## 2020-12-02 ENCOUNTER — VIRTUAL VISIT (OUTPATIENT)
Dept: FAMILY MEDICINE CLINIC | Age: 65
End: 2020-12-02
Payer: MEDICARE

## 2020-12-02 DIAGNOSIS — I10 ESSENTIAL HYPERTENSION: ICD-10-CM

## 2020-12-02 DIAGNOSIS — N28.9 KIDNEY INSUFFICIENCY: ICD-10-CM

## 2020-12-02 DIAGNOSIS — E78.5 HYPERLIPIDEMIA, UNSPECIFIED HYPERLIPIDEMIA TYPE: ICD-10-CM

## 2020-12-02 DIAGNOSIS — E78.5 HYPERLIPIDEMIA, UNSPECIFIED HYPERLIPIDEMIA TYPE: Primary | ICD-10-CM

## 2020-12-02 DIAGNOSIS — Z71.89 COUNSELING ON HEALTH PROMOTION AND DISEASE PREVENTION: ICD-10-CM

## 2020-12-02 DIAGNOSIS — Z71.2 ENCOUNTER TO DISCUSS TEST RESULTS: ICD-10-CM

## 2020-12-02 PROCEDURE — G8432 DEP SCR NOT DOC, RNG: HCPCS | Performed by: NURSE PRACTITIONER

## 2020-12-02 PROCEDURE — 1090F PRES/ABSN URINE INCON ASSESS: CPT | Performed by: NURSE PRACTITIONER

## 2020-12-02 PROCEDURE — G9899 SCRN MAM PERF RSLTS DOC: HCPCS | Performed by: NURSE PRACTITIONER

## 2020-12-02 PROCEDURE — 3017F COLORECTAL CA SCREEN DOC REV: CPT | Performed by: NURSE PRACTITIONER

## 2020-12-02 PROCEDURE — 99214 OFFICE O/P EST MOD 30 MIN: CPT | Performed by: NURSE PRACTITIONER

## 2020-12-02 PROCEDURE — 1101F PT FALLS ASSESS-DOCD LE1/YR: CPT | Performed by: NURSE PRACTITIONER

## 2020-12-02 PROCEDURE — G8427 DOCREV CUR MEDS BY ELIG CLIN: HCPCS | Performed by: NURSE PRACTITIONER

## 2020-12-02 PROCEDURE — G8756 NO BP MEASURE DOC: HCPCS | Performed by: NURSE PRACTITIONER

## 2020-12-02 PROCEDURE — G8399 PT W/DXA RESULTS DOCUMENT: HCPCS | Performed by: NURSE PRACTITIONER

## 2020-12-02 RX ORDER — FENOFIBRATE 145 MG/1
145 TABLET, COATED ORAL DAILY
Qty: 90 TAB | Refills: 1 | Status: SHIPPED | OUTPATIENT
Start: 2020-12-02 | End: 2021-07-20 | Stop reason: SDUPTHER

## 2020-12-02 NOTE — PROGRESS NOTES
Jose J Lee is a 76 y.o. female who was seen by synchronous (real-time) audio-video technology on 12/2/2020 for Hypertension and Cholesterol Problem (high chol)    Patient of Dr. Barbara Monteiro. She is in need of medication refills. She needs her blood pressure medication and her cholesterol medications refilled. Reports her blood pressure has been running around 128/80. She takes her blood pressure pills daily. She takes her Tricor daily as well. She reports she tries not to take any OTC medications and she has cut back on her ibuprofen and her tylenol. Denies any history of kidney insufficiency or concerns. She reports rarely drinking alcohol. She denies any chest pain, shortness of breath, or any swelling in her lower extremities. Assessment & Plan:   Diagnoses and all orders for this visit:    1. Hyperlipidemia, unspecified hyperlipidemia type    2. Essential hypertension  -     METABOLIC PANEL, COMPREHENSIVE; Future    3. Encounter to discuss test results    4. Kidney insufficiency  -     METABOLIC PANEL, COMPREHENSIVE; Future    5. Counseling on health promotion and disease prevention      Reports her colonoscopy is not due for another 2 years and her mammogram is not due until next year. She states she has a copy of these and will get this information to the office. She is aware that she is due for a Medicare Wellness Visit. Labs discussed. I have discussed her diet with her in detail along with exercise. She is to have her lab repeated. I have spent 25 minutes with this patient and greater than 50% of this time was spent on educating and counseling patient regarding her labs, kidney function, prediabetes, hypertension, cholesterol, care gaps, diet, and exercise. Subjective:       Prior to Admission medications    Medication Sig Start Date End Date Taking? Authorizing Provider   telmisartan (MICARDIS) 20 mg tablet Take 1 Tab by mouth daily.  11/13/20  Yes Randi Sifuentes MD   fenofibrate nanocrystallized (TRICOR) 145 mg tablet Take 1 Tab by mouth daily. 5/28/20  Yes Graves, Vona Schirmer, MD   coenzyme q10 (CO Q-10) 10 mg cap Take  by mouth. Yes Provider, Historical   multivitamin (ONE A DAY) tablet Take 1 Tab by mouth daily. Yes Provider, Historical   ascorbic acid, vitamin C, (VITAMIN C) 250 mg tablet Take  by mouth. Yes Provider, Historical   aspirin 81 mg tablet Take 81 mg by mouth. Yes Other, MD Isaiah   ACETAMINOPHEN (TYLENOL PO) Take  by mouth. 12/2/20  Other, MD Isaiah   ibuprofen (ADVIL) 200 mg tablet Take 200 mg by mouth. 12/2/20  Other, MD Isaiah   IBUPROFEN/DIPHENHYDRAMINE (ADVIL PM PO) Take  by mouth. 12/2/20  Isaiah Resendiz MD     Patient Active Problem List   Diagnosis Code    Gastroenteritis K52.9    Dehydration E86.0     Patient Active Problem List    Diagnosis Date Noted    Gastroenteritis 12/27/2012    Dehydration 12/27/2012     Current Outpatient Medications   Medication Sig Dispense Refill    telmisartan (MICARDIS) 20 mg tablet Take 1 Tab by mouth daily. 90 Tab 1    fenofibrate nanocrystallized (TRICOR) 145 mg tablet Take 1 Tab by mouth daily. 90 Tab 1    coenzyme q10 (CO Q-10) 10 mg cap Take  by mouth.  multivitamin (ONE A DAY) tablet Take 1 Tab by mouth daily.  ascorbic acid, vitamin C, (VITAMIN C) 250 mg tablet Take  by mouth.  aspirin 81 mg tablet Take 81 mg by mouth.          No Known Allergies  Past Medical History:   Diagnosis Date    High cholesterol     Hypertension      Past Surgical History:   Procedure Laterality Date    BREAST SURGERY PROCEDURE UNLISTED  2000    right lumpectomy    HX BLADDER SUSPENSION      HX GYN      hysterectomy    HX OOPHORECTOMY Left      Family History   Problem Relation Age of Onset    Dementia Mother     Hypertension Mother     High Cholesterol Mother     Other Mother         pre-dm    No Known Problems Son     No Known Problems Son     Other Father         homicide    Other Sister         AIDS    No Known Problems Brother     COPD Sister     No Known Problems Sister      Social History     Tobacco Use    Smoking status: Never Smoker    Smokeless tobacco: Never Used   Substance Use Topics    Alcohol use: Yes     Alcohol/week: 1.7 standard drinks     Types: 2 Glasses of wine per week     Frequency: Monthly or less     Drinks per session: 1 or 2     Binge frequency: Less than monthly       Review of Systems   Respiratory: Negative for shortness of breath. Cardiovascular: Negative for chest pain and leg swelling. Gastrointestinal: Negative for abdominal pain, nausea and vomiting. Genitourinary: Negative. Musculoskeletal: Negative for back pain. Neurological: Negative for dizziness. Objective:   No flowsheet data found.      [INSTRUCTIONS:  \"[x]\" Indicates a positive item  \"[]\" Indicates a negative item  -- DELETE ALL ITEMS NOT EXAMINED]    Constitutional: [x] Appears well-developed and well-nourished [x] No apparent distress      [] Abnormal -     Mental status: [x] Alert and awake  [x] Oriented to person/place/time [x] Able to follow commands    [] Abnormal -     Eyes:   EOM    [x]  Normal    [] Abnormal -   Sclera  [x]  Normal    [] Abnormal -          Discharge [x]  None visible   [] Abnormal -     HENT: [x] Normocephalic, atraumatic  [] Abnormal -   [x] Mouth/Throat: Mucous membranes are moist    External Ears [x] Normal  [] Abnormal -    Neck: [x] No visualized mass [] Abnormal -     Pulmonary/Chest: [x] Respiratory effort normal   [x] No visualized signs of difficulty breathing or respiratory distress        [] Abnormal -      Musculoskeletal:   [x] Normal gait with no signs of ataxia         [x] Normal range of motion of neck        [] Abnormal -     Neurological:        [x] No Facial Asymmetry (Cranial nerve 7 motor function) (limited exam due to video visit)          [x] No gaze palsy        [] Abnormal -          Skin:        [x] No significant exanthematous lesions or discoloration noted on facial skin         [] Abnormal -            Psychiatric:       [x] Normal Affect [] Abnormal -        [x] No Hallucinations    We discussed the expected course, resolution and complications of the diagnosis(es) in detail. Medication risks, benefits, costs, interactions, and alternatives were discussed as indicated. I advised her to contact the office if her condition worsens, changes or fails to improve as anticipated. She expressed understanding with the diagnosis(es) and plan. Rosibel Enriquez, who was evaluated through a patient-initiated, synchronous (real-time) audio-video encounter, and/or her healthcare decision maker, is aware that it is a billable service, with coverage as determined by her insurance carrier. She provided verbal consent to proceed: Yes, and patient identification was verified. It was conducted pursuant to the emergency declaration under the [facility], [address] [place] [address] authority and the [employer] General Act. A caregiver was present when appropriate. Ability to conduct physical exam was limited. I was at home. The patient was at home.       Ela Junior NP

## 2020-12-02 NOTE — PROGRESS NOTES
Susy December presents today for   Chief Complaint   Patient presents with    Hypertension    Cholesterol Problem     high chol       Is someone accompanying this pt? no    Is the patient using any DME equipment during OV? no    Depression Screening:  3 most recent PHQ Screens 1/9/2020   Little interest or pleasure in doing things Not at all   Feeling down, depressed, irritable, or hopeless Not at all   Total Score PHQ 2 0       Learning Assessment:  Learning Assessment 1/9/2020   PRIMARY LEARNER Patient   HIGHEST LEVEL OF EDUCATION - PRIMARY LEARNER  SOME COLLEGE   BARRIERS PRIMARY LEARNER NONE   CO-LEARNER CAREGIVER No   PRIMARY LANGUAGE ENGLISH   LEARNER PREFERENCE PRIMARY LISTENING   ANSWERED BY self   RELATIONSHIP SELF       Abuse Screening:  Abuse Screening Questionnaire 1/9/2020   Do you ever feel afraid of your partner? N   Are you in a relationship with someone who physically or mentally threatens you? N   Is it safe for you to go home? Y       Fall Screening  Fall Risk Assessment, last 12 mths 1/9/2020   Able to walk? Yes   Fall in past 12 months? No       Generalized Anxiety  No flowsheet data found. Health Maintenance Due   Topic Date Due    Shingrix Vaccine Age 49> (1 of 2) 10/22/2004    Colorectal Cancer Screening Combo  10/22/2004    GLAUCOMA SCREENING Q2Y  10/22/2019    Pneumococcal 65+ years (1 of 1 - PPSV23) 10/22/2019    Medicare Yearly Exam  11/06/2019    Breast Cancer Screen Mammogram  10/02/2020   . Health Maintenance reviewed and discussed and ordered per Provider. Coordination of Care  1. Have you been to the ER, urgent care clinic since your last visit? Hospitalized since your last visit? no    2. Have you seen or consulted any other health care providers outside of the [address] [place] [address] since your last visit? Include any pap smears or colon screening.  no      Advance Directive:  Discussed 1/9/20

## 2021-01-11 ENCOUNTER — TELEPHONE (OUTPATIENT)
Dept: FAMILY MEDICINE CLINIC | Age: 66
End: 2021-01-11

## 2021-01-11 NOTE — TELEPHONE ENCOUNTER
Pt's hep c screening was done 5/2020. This is a once per lifetime recommendation from the cdc for screening unless she has concerns about exposure since that test was done. If she is not concerned about exposure since 5/2020, we don't need to recheck it.

## 2021-01-11 NOTE — TELEPHONE ENCOUNTER
Patient called and wanted to know if you could add a Hep C lab she will be going on Wednesday to have her other labs done.  Please advise

## 2021-01-13 ENCOUNTER — HOSPITAL ENCOUNTER (OUTPATIENT)
Dept: LAB | Age: 66
Discharge: HOME | End: 2021-01-13
Payer: MEDICARE

## 2021-01-13 DIAGNOSIS — I10 ESSENTIAL HYPERTENSION: ICD-10-CM

## 2021-01-13 DIAGNOSIS — N28.9 KIDNEY INSUFFICIENCY: ICD-10-CM

## 2021-01-13 LAB
ALBUMIN SERPL-MCNC: 3.9 G/DL (ref 3.4–5)
ALBUMIN/GLOB SERPL: 1.2 {RATIO} (ref 0.8–1.7)
ALP SERPL-CCNC: 107 U/L (ref 45–117)
ALT SERPL-CCNC: 34 U/L (ref 13–56)
ANION GAP SERPL CALC-SCNC: 5 MMOL/L (ref 3–18)
AST SERPL-CCNC: 21 U/L (ref 10–38)
BILIRUB SERPL-MCNC: 0.7 MG/DL (ref 0.2–1)
BUN SERPL-MCNC: 21 MG/DL (ref 7–18)
BUN/CREAT SERPL: 20 (ref 12–20)
CALCIUM SERPL-MCNC: 9.8 MG/DL (ref 8.5–10.1)
CHLORIDE SERPL-SCNC: 111 MMOL/L (ref 100–111)
CO2 SERPL-SCNC: 28 MMOL/L (ref 21–32)
CREAT SERPL-MCNC: 1.07 MG/DL (ref 0.6–1.3)
GLOBULIN SER CALC-MCNC: 3.2 G/DL (ref 2–4)
GLUCOSE SERPL-MCNC: 84 MG/DL (ref 74–99)
POTASSIUM SERPL-SCNC: 4.3 MMOL/L (ref 3.5–5.5)
PROT SERPL-MCNC: 7.1 G/DL (ref 6.4–8.2)
SODIUM SERPL-SCNC: 144 MMOL/L (ref 136–145)

## 2021-01-13 PROCEDURE — 36415 COLL VENOUS BLD VENIPUNCTURE: CPT

## 2021-01-13 PROCEDURE — 80053 COMPREHEN METABOLIC PANEL: CPT

## 2021-01-14 ENCOUNTER — TELEPHONE (OUTPATIENT)
Dept: FAMILY MEDICINE CLINIC | Age: 66
End: 2021-01-14

## 2021-01-14 DIAGNOSIS — Z12.31 ENCOUNTER FOR SCREENING MAMMOGRAM FOR MALIGNANT NEOPLASM OF BREAST: Primary | ICD-10-CM

## 2021-01-14 DIAGNOSIS — I10 ESSENTIAL HYPERTENSION: ICD-10-CM

## 2021-01-14 DIAGNOSIS — E78.5 HYPERLIPIDEMIA, UNSPECIFIED HYPERLIPIDEMIA TYPE: ICD-10-CM

## 2021-01-14 DIAGNOSIS — Z12.31 ENCOUNTER FOR SCREENING MAMMOGRAM FOR MALIGNANT NEOPLASM OF BREAST: ICD-10-CM

## 2021-01-14 NOTE — TELEPHONE ENCOUNTER
Patient need her labs ordered Candy ordered some but not Lipid. And also she need a referral for a mammogram and she will get it done the [facility] she will come and  the order.  Please advise

## 2021-01-18 ENCOUNTER — HOSPITAL ENCOUNTER (OUTPATIENT)
Dept: LAB | Age: 66
Discharge: HOME | End: 2021-01-18
Payer: MEDICARE

## 2021-01-18 DIAGNOSIS — E78.5 HYPERLIPIDEMIA, UNSPECIFIED HYPERLIPIDEMIA TYPE: ICD-10-CM

## 2021-01-18 DIAGNOSIS — I10 ESSENTIAL HYPERTENSION: ICD-10-CM

## 2021-01-18 LAB
CHOLEST SERPL-MCNC: 215 MG/DL
HDLC SERPL-MCNC: 45 MG/DL (ref 40–60)
HDLC SERPL: 4.8 {RATIO} (ref 0–5)
LDLC SERPL CALC-MCNC: 130 MG/DL (ref 0–100)
LIPID PROFILE,FLP: ABNORMAL
TRIGL SERPL-MCNC: 200 MG/DL (ref ?–150)
VLDLC SERPL CALC-MCNC: 40 MG/DL

## 2021-01-18 PROCEDURE — 36415 COLL VENOUS BLD VENIPUNCTURE: CPT

## 2021-01-18 PROCEDURE — 80061 LIPID PANEL: CPT

## 2021-01-20 ENCOUNTER — VIRTUAL VISIT (OUTPATIENT)
Dept: FAMILY MEDICINE CLINIC | Age: 66
End: 2021-01-20
Payer: MEDICARE

## 2021-01-20 DIAGNOSIS — I10 ESSENTIAL HYPERTENSION: ICD-10-CM

## 2021-01-20 DIAGNOSIS — Z71.85 VACCINE COUNSELING: ICD-10-CM

## 2021-01-20 DIAGNOSIS — E78.5 HYPERLIPIDEMIA, UNSPECIFIED HYPERLIPIDEMIA TYPE: ICD-10-CM

## 2021-01-20 DIAGNOSIS — R73.01 IFG (IMPAIRED FASTING GLUCOSE): Primary | ICD-10-CM

## 2021-01-20 PROCEDURE — G8399 PT W/DXA RESULTS DOCUMENT: HCPCS | Performed by: FAMILY MEDICINE

## 2021-01-20 PROCEDURE — G0463 HOSPITAL OUTPT CLINIC VISIT: HCPCS | Performed by: FAMILY MEDICINE

## 2021-01-20 PROCEDURE — 99214 OFFICE O/P EST MOD 30 MIN: CPT | Performed by: FAMILY MEDICINE

## 2021-01-20 PROCEDURE — G9899 SCRN MAM PERF RSLTS DOC: HCPCS | Performed by: FAMILY MEDICINE

## 2021-01-20 PROCEDURE — 1101F PT FALLS ASSESS-DOCD LE1/YR: CPT | Performed by: FAMILY MEDICINE

## 2021-01-20 PROCEDURE — G8510 SCR DEP NEG, NO PLAN REQD: HCPCS | Performed by: FAMILY MEDICINE

## 2021-01-20 PROCEDURE — 3017F COLORECTAL CA SCREEN DOC REV: CPT | Performed by: FAMILY MEDICINE

## 2021-01-20 PROCEDURE — G8756 NO BP MEASURE DOC: HCPCS | Performed by: FAMILY MEDICINE

## 2021-01-20 PROCEDURE — 1090F PRES/ABSN URINE INCON ASSESS: CPT | Performed by: FAMILY MEDICINE

## 2021-01-20 PROCEDURE — G8427 DOCREV CUR MEDS BY ELIG CLIN: HCPCS | Performed by: FAMILY MEDICINE

## 2021-01-20 RX ORDER — CLOBETASOL PROPIONATE 0.5 MG/G
OINTMENT TOPICAL
COMMUNITY
Start: 2020-12-04 | End: 2021-06-02 | Stop reason: ALTCHOICE

## 2021-01-20 NOTE — PROGRESS NOTES
Shade Díaz presents today for   Chief Complaint   Patient presents with    Hypertension    Cholesterol Problem    Labs     Completed        Virtual/telephone visit    Depression Screening:  3 most recent PHQ Screens 1/20/2021   Little interest or pleasure in doing things Not at all   Feeling down, depressed, irritable, or hopeless Several days   Total Score PHQ 2 1       Learning Assessment:  Learning Assessment 1/9/2020   PRIMARY LEARNER Patient   HIGHEST LEVEL OF EDUCATION - PRIMARY LEARNER  SOME COLLEGE   BARRIERS PRIMARY LEARNER NONE   CO-LEARNER CAREGIVER No   PRIMARY LANGUAGE ENGLISH   LEARNER PREFERENCE PRIMARY LISTENING   ANSWERED BY self   RELATIONSHIP SELF       Fall Risk  Fall Risk Assessment, last 12 mths 1/20/2021   Able to walk? Yes   Fall in past 12 months? 0       Travel Screening:   Travel Screening      No screening recorded since 01/19/21 0000      Travel History   Travel since 12/20/20     No documented travel since 12/20/20          Health Maintenance reviewed and discussed and ordered per Provider. Health Maintenance Due   Topic Date Due    COVID-19 Vaccine (1 of 2) 10/22/1970    Shingrix Vaccine Age 50> (1 of 2) 10/22/2004    Colorectal Cancer Screening Combo  10/22/2004    GLAUCOMA SCREENING Q2Y  10/22/2019    Medicare Yearly Exam  11/06/2019    Breast Cancer Screen Mammogram  10/02/2020   . Coordination of Care:  1. Have you been to the ER, urgent care clinic since your last visit? Hospitalized since your last visit? No    2. Have you seen or consulted any other health care providers outside of the [address] [place] [address] since your last visit? Include any pap smears or colon screening.  No.

## 2021-01-20 NOTE — PROGRESS NOTES
Radha Regalado is a 76 y.o. female who was seen by synchronous (real-time) audio-video technology on 1/20/2021 for Hypertension, Cholesterol Problem, and Labs (Completed )        Assessment & Plan:   Diagnoses and all orders for this visit:    1. IFG (impaired fasting glucose)  -     METABOLIC PANEL, COMPREHENSIVE; Future  -     LIPID PANEL; Future  -     HEMOGLOBIN A1C WITH EAG; Future    2. Essential hypertension  -     METABOLIC PANEL, COMPREHENSIVE; Future  -     LIPID PANEL; Future  Stable, cont pres tx plan. 3. Hyperlipidemia, unspecified hyperlipidemia type  -     METABOLIC PANEL, COMPREHENSIVE; Future  -     LIPID PANEL; Future  Long discussion re: diet. Recommend pt check [email or web address] for more info on heart healthy diet. 4. Vaccine counseling  Recommend covid vaccination when available. The complexity of medical decision making for this visit is moderate   Follow-up and Dispositions    · Return in about 3 months (around 4/20/2021) for elevated fasting blood sugar, high cholesterol, high blood pressure, lab review. 712  Subjective:   Patient contacted via doxy. me. Pt has been doing well overall. Pt has made diet changes in the hopes of improving her lipids. She is also walking 2.5 miles daily and doing exercises at home as well. She is taking her tricor daily. Prior to Admission medications    Medication Sig Start Date End Date Taking? Authorizing Provider   clobetasoL (TEMOVATE) 0.05 % ointment  12/4/20  Yes Provider, Historical   fenofibrate nanocrystallized (TRICOR) 145 mg tablet Take 1 Tab by mouth daily. 12/2/20  Yes Jurgen Sanders MD   telmisartan (MICARDIS) 20 mg tablet Take 1 Tab by mouth daily. 11/13/20  Yes Gunjan Verdugo MD   coenzyme q10 (CO Q-10) 10 mg cap Take  by mouth. Yes Provider, Historical   multivitamin (ONE A DAY) tablet Take 1 Tab by mouth daily.    Yes Provider, Historical   ascorbic acid, vitamin C, (VITAMIN C) 250 mg tablet Take  by mouth.   Yes Provider, Historical   aspirin 81 mg tablet Take 81 mg by mouth. Yes Other, MD Isaiah     Patient Active Problem List   Diagnosis Code    Gastroenteritis K52.9    Dehydration E86.0     Current Outpatient Medications   Medication Sig Dispense Refill    clobetasoL (TEMOVATE) 0.05 % ointment       fenofibrate nanocrystallized (TRICOR) 145 mg tablet Take 1 Tab by mouth daily. 90 Tab 1    telmisartan (MICARDIS) 20 mg tablet Take 1 Tab by mouth daily. 90 Tab 1    coenzyme q10 (CO Q-10) 10 mg cap Take  by mouth.  multivitamin (ONE A DAY) tablet Take 1 Tab by mouth daily.  ascorbic acid, vitamin C, (VITAMIN C) 250 mg tablet Take  by mouth.  aspirin 81 mg tablet Take 81 mg by mouth. No Known Allergies  Past Medical History:   Diagnosis Date    High cholesterol     Hypertension      Past Surgical History:   Procedure Laterality Date    HX BLADDER SUSPENSION      HX GYN      hysterectomy    HX OOPHORECTOMY Left     SD BREAST SURGERY PROCEDURE UNLISTED  2000    right lumpectomy     Family History   Problem Relation Age of Onset    Dementia Mother     Hypertension Mother     High Cholesterol Mother     Other Mother         pre-dm    No Known Problems Son     No Known Problems Son     Other Father         homicide   Nina Her Other Sister         AIDS    No Known Problems Brother     COPD Sister     No Known Problems Sister      Social History     Tobacco Use    Smoking status: Never Smoker    Smokeless tobacco: Never Used   Substance Use Topics    Alcohol use: Yes     Alcohol/week: 1.7 standard drinks     Types: 2 Glasses of wine per week     Frequency: Monthly or less     Drinks per session: 1 or 2     Binge frequency: Less than monthly       Review of Systems   Constitutional: Negative. HENT: Negative. Respiratory: Negative. Cardiovascular: Negative. All other systems reviewed and are negative.       Objective:     Patient-Reported Vitals 1/20/2021 Patient-Reported Pulse 83   Patient-Reported Systolic  102   Patient-Reported Diastolic 75      General: alert, cooperative, no distress   Mental  status: normal mood, behavior, speech, dress, motor activity, and thought processes, able to follow commands   HENT: NCAT   Neck: no visualized mass   Resp: no respiratory distress   Neuro: no gross deficits   Skin: no discoloration or lesions of concern on visible areas   Psychiatric: normal affect, consistent with stated mood, no evidence of hallucinations     Additional exam findings: none      We discussed the expected course, resolution and complications of the diagnosis(es) in detail. Medication risks, benefits, costs, interactions, and alternatives were discussed as indicated. I advised her to contact the office if her condition worsens, changes or fails to improve as anticipated. She expressed understanding with the diagnosis(es) and plan. Hilary Renteria, who was evaluated through a patient-initiated, synchronous (real-time) audio-video encounter, and/or her healthcare decision maker, is aware that it is a billable service, with coverage as determined by her insurance carrier. She provided verbal consent to proceed: n/a- consent obtained within past 12 months, and patient identification was verified. It was conducted pursuant to the emergency declaration under the [address] [place] [address] authority and the [employer] Act. A caregiver was present when appropriate. Ability to conduct physical exam was limited. I was in the office. The patient was at home.       Yodit Kelley MD

## 2021-03-23 ENCOUNTER — HOSPITAL ENCOUNTER (OUTPATIENT)
Dept: MAMMOGRAPHY | Age: 66
Discharge: HOME | End: 2021-03-23
Attending: FAMILY MEDICINE
Payer: MEDICARE

## 2021-03-23 PROCEDURE — 77063 BREAST TOMOSYNTHESIS BI: CPT

## 2021-03-23 PROCEDURE — 77067 SCR MAMMO BI INCL CAD: CPT

## 2021-04-13 ENCOUNTER — HOSPITAL ENCOUNTER (OUTPATIENT)
Dept: LAB | Age: 66
Discharge: HOME | End: 2021-04-13
Payer: MEDICARE

## 2021-04-13 DIAGNOSIS — R73.01 IFG (IMPAIRED FASTING GLUCOSE): ICD-10-CM

## 2021-04-13 DIAGNOSIS — E78.5 HYPERLIPIDEMIA, UNSPECIFIED HYPERLIPIDEMIA TYPE: ICD-10-CM

## 2021-04-13 DIAGNOSIS — I10 ESSENTIAL HYPERTENSION: ICD-10-CM

## 2021-04-13 LAB
ALBUMIN SERPL-MCNC: 4.2 G/DL (ref 3.4–5)
ALBUMIN/GLOB SERPL: 1.4 {RATIO} (ref 0.8–1.7)
ALP SERPL-CCNC: 114 U/L (ref 45–117)
ALT SERPL-CCNC: 32 U/L (ref 13–56)
ANION GAP SERPL CALC-SCNC: 4 MMOL/L (ref 3–18)
AST SERPL-CCNC: 22 U/L (ref 10–38)
BILIRUB SERPL-MCNC: 0.4 MG/DL (ref 0.2–1)
BUN SERPL-MCNC: 19 MG/DL (ref 7–18)
BUN/CREAT SERPL: 20 (ref 12–20)
CALCIUM SERPL-MCNC: 10.4 MG/DL (ref 8.5–10.1)
CHLORIDE SERPL-SCNC: 110 MMOL/L (ref 100–111)
CHOLEST SERPL-MCNC: 210 MG/DL
CO2 SERPL-SCNC: 27 MMOL/L (ref 21–32)
CREAT SERPL-MCNC: 0.94 MG/DL (ref 0.6–1.3)
EST. AVERAGE GLUCOSE BLD GHB EST-MCNC: 120 MG/DL
GLOBULIN SER CALC-MCNC: 3 G/DL (ref 2–4)
GLUCOSE SERPL-MCNC: 84 MG/DL (ref 74–99)
HBA1C MFR BLD: 5.8 % (ref 4.2–5.6)
HDLC SERPL-MCNC: 48 MG/DL (ref 40–60)
HDLC SERPL: 4.4 {RATIO} (ref 0–5)
LDLC SERPL CALC-MCNC: 124.8 MG/DL (ref 0–100)
LIPID PROFILE,FLP: ABNORMAL
POTASSIUM SERPL-SCNC: 4.6 MMOL/L (ref 3.5–5.5)
PROT SERPL-MCNC: 7.2 G/DL (ref 6.4–8.2)
SODIUM SERPL-SCNC: 141 MMOL/L (ref 136–145)
TRIGL SERPL-MCNC: 186 MG/DL (ref ?–150)
VLDLC SERPL CALC-MCNC: 37.2 MG/DL

## 2021-04-13 PROCEDURE — 80053 COMPREHEN METABOLIC PANEL: CPT

## 2021-04-13 PROCEDURE — 36415 COLL VENOUS BLD VENIPUNCTURE: CPT

## 2021-04-13 PROCEDURE — 80061 LIPID PANEL: CPT

## 2021-04-13 PROCEDURE — 83036 HEMOGLOBIN GLYCOSYLATED A1C: CPT

## 2021-04-22 ENCOUNTER — VIRTUAL VISIT (OUTPATIENT)
Dept: FAMILY MEDICINE CLINIC | Age: 66
End: 2021-04-22
Payer: MEDICARE

## 2021-04-22 ENCOUNTER — TELEPHONE (OUTPATIENT)
Dept: FAMILY MEDICINE CLINIC | Age: 66
End: 2021-04-22

## 2021-04-22 DIAGNOSIS — E78.5 HYPERLIPIDEMIA, UNSPECIFIED HYPERLIPIDEMIA TYPE: ICD-10-CM

## 2021-04-22 DIAGNOSIS — R92.8 ABNORMALITY OF LEFT BREAST ON SCREENING MAMMOGRAM: ICD-10-CM

## 2021-04-22 DIAGNOSIS — R73.01 IFG (IMPAIRED FASTING GLUCOSE): Primary | ICD-10-CM

## 2021-04-22 PROCEDURE — G8756 NO BP MEASURE DOC: HCPCS | Performed by: FAMILY MEDICINE

## 2021-04-22 PROCEDURE — G8427 DOCREV CUR MEDS BY ELIG CLIN: HCPCS | Performed by: FAMILY MEDICINE

## 2021-04-22 PROCEDURE — G8510 SCR DEP NEG, NO PLAN REQD: HCPCS | Performed by: FAMILY MEDICINE

## 2021-04-22 PROCEDURE — 3017F COLORECTAL CA SCREEN DOC REV: CPT | Performed by: FAMILY MEDICINE

## 2021-04-22 PROCEDURE — G0463 HOSPITAL OUTPT CLINIC VISIT: HCPCS | Performed by: FAMILY MEDICINE

## 2021-04-22 PROCEDURE — 1090F PRES/ABSN URINE INCON ASSESS: CPT | Performed by: FAMILY MEDICINE

## 2021-04-22 PROCEDURE — G9899 SCRN MAM PERF RSLTS DOC: HCPCS | Performed by: FAMILY MEDICINE

## 2021-04-22 PROCEDURE — G8399 PT W/DXA RESULTS DOCUMENT: HCPCS | Performed by: FAMILY MEDICINE

## 2021-04-22 PROCEDURE — 99214 OFFICE O/P EST MOD 30 MIN: CPT | Performed by: FAMILY MEDICINE

## 2021-04-22 PROCEDURE — 1101F PT FALLS ASSESS-DOCD LE1/YR: CPT | Performed by: FAMILY MEDICINE

## 2021-04-22 RX ORDER — TRETINOIN 0.5 MG/G
CREAM TOPICAL
COMMUNITY
Start: 2021-03-24 | End: 2021-06-02 | Stop reason: ALTCHOICE

## 2021-04-22 RX ORDER — PREDNISOLONE ACETATE 10 MG/ML
SUSPENSION/ DROPS OPHTHALMIC
COMMUNITY
Start: 2021-04-15 | End: 2021-06-02 | Stop reason: ALTCHOICE

## 2021-04-22 NOTE — PROGRESS NOTES
Blake Marin presents today for No chief complaint on file. Virtual/telephone visit    Depression Screening:  3 most recent PHQ Screens 1/20/2021   Little interest or pleasure in doing things Not at all   Feeling down, depressed, irritable, or hopeless Several days   Total Score PHQ 2 1       Learning Assessment:  Learning Assessment 1/9/2020   PRIMARY LEARNER Patient   HIGHEST LEVEL OF EDUCATION - PRIMARY LEARNER  SOME COLLEGE   BARRIERS PRIMARY LEARNER NONE   CO-LEARNER CAREGIVER No   PRIMARY LANGUAGE ENGLISH   LEARNER PREFERENCE PRIMARY LISTENING   ANSWERED BY self   RELATIONSHIP SELF       Fall Risk  Fall Risk Assessment, last 12 mths 1/20/2021   Able to walk? Yes   Fall in past 12 months? 0       Travel Screening:   Travel Screening     Question   Response    In the last month, have you been in contact with someone who was confirmed or suspected to have Coronavirus / COVID-19? No / Unsure    Have you had a COVID-19 viral test in the last 14 days? No    Do you have any of the following new or worsening symptoms? None of these    Have you traveled internationally or domestically in the last month? No      Travel History   Travel since 03/22/21     No documented travel since 03/22/21          Health Maintenance reviewed and discussed and ordered per Provider. Health Maintenance Due   Topic Date Due    COVID-19 Vaccine (1) Never done    Shingrix Vaccine Age 50> (1 of 2) Never done    Colorectal Cancer Screening Combo  Never done    Medicare Yearly Exam  Never done   . Coordination of Care:  1. Have you been to the ER, urgent care clinic since your last visit? Hospitalized since your last visit? No    2. Have you seen or consulted any other health care providers outside of the [address] [place] [address] since your last visit? Include any pap smears or colon screening.  No

## 2021-04-22 NOTE — PROGRESS NOTES
Haven Owen is a 76 y.o. female who was seen by synchronous (real-time) audio-video technology on 4/22/2021 for Blood sugar problem, Cholesterol Problem, and Labs (4/13/2021.)        Assessment & Plan:   Diagnoses and all orders for this visit:    1. IFG (impaired fasting glucose)  -     METABOLIC PANEL, COMPREHENSIVE; Future  -     LIPID PANEL; Future  Improving. Pt commended. Cont healthy lifestyle changes. 2. Abnormality of left breast on screening mammogram  -     JACOB MAMMO LT DX INCL CAD; Future  -     US BREAST LT LIMITED=<3 QUAD; Future    3. Hyperlipidemia, unspecified hyperlipidemia type  -     METABOLIC PANEL, COMPREHENSIVE; Future  -     LIPID PANEL; Future  Cont to work on diet and exercise. The complexity of medical decision making for this visit is moderate   Follow-up and Dispositions    · Return in about 3 months (around 7/22/2021) for elevated fasting blood sugar, lab review, high cholesterol. 712  Subjective:   Patient contacted via doxy. me. Pt is feeling well overall. Recent labs reviewed in detail. Pt will have a colo in May 2021. Pt will get her shingles vaccination today. Prior to Admission medications    Medication Sig Start Date End Date Taking? Authorizing Provider   tretinoin (RETIN-A) 0.05 % topical cream APPLY PEA SIZED AMOUNT TOPICALLY TO FACE 3 TO 5 NIGHTS EVERY WEEK AS TOLERATED 3/24/21  Yes Provider, Historical   prednisoLONE acetate (PRED FORTE) 1 % ophthalmic suspension  4/15/21  Yes Provider, Historical   clobetasoL (TEMOVATE) 0.05 % ointment  12/4/20  Yes Provider, Historical   fenofibrate nanocrystallized (TRICOR) 145 mg tablet Take 1 Tab by mouth daily. 12/2/20  Yes Jonel Woodward MD   telmisartan (MICARDIS) 20 mg tablet Take 1 Tab by mouth daily. 11/13/20  Yes Graves, Audrea Olszewski, MD   coenzyme q10 (CO Q-10) 10 mg cap Take  by mouth. Yes Provider, Historical   multivitamin (ONE A DAY) tablet Take 1 Tab by mouth daily.    Yes Provider, Historical   ascorbic acid, vitamin C, (VITAMIN C) 250 mg tablet Take  by mouth. Yes Provider, Historical   aspirin 81 mg tablet Take 81 mg by mouth. Yes Other, MD Isaiah     Patient Active Problem List   Diagnosis Code    Gastroenteritis K52.9    Dehydration E86.0    IFG (impaired fasting glucose) R73.01    Hyperlipidemia E78.5    Abnormality of left breast on screening mammogram R92.8     Current Outpatient Medications   Medication Sig Dispense Refill    tretinoin (RETIN-A) 0.05 % topical cream APPLY PEA SIZED AMOUNT TOPICALLY TO FACE 3 TO 5 NIGHTS EVERY WEEK AS TOLERATED      prednisoLONE acetate (PRED FORTE) 1 % ophthalmic suspension       clobetasoL (TEMOVATE) 0.05 % ointment       fenofibrate nanocrystallized (TRICOR) 145 mg tablet Take 1 Tab by mouth daily. 90 Tab 1    telmisartan (MICARDIS) 20 mg tablet Take 1 Tab by mouth daily. 90 Tab 1    coenzyme q10 (CO Q-10) 10 mg cap Take  by mouth.  multivitamin (ONE A DAY) tablet Take 1 Tab by mouth daily.  ascorbic acid, vitamin C, (VITAMIN C) 250 mg tablet Take  by mouth.  aspirin 81 mg tablet Take 81 mg by mouth. No Known Allergies  Past Medical History:   Diagnosis Date    High cholesterol     Hypertension      Past Surgical History:   Procedure Laterality Date    HX BLADDER SUSPENSION      HX GYN      hysterectomy    HX OOPHORECTOMY Left     [place] BREAST SURGERY PROCEDURE UNLISTED  2000    right lumpectomy     Family History   Problem Relation Age of Onset    Dementia Mother     Hypertension Mother     High Cholesterol Mother     Other Mother         pre-dm    No Known Problems Son     No Known Problems Son     Other Father         homicide   [facility] Other Sister         AIDS    No Known Problems Brother     COPD Sister     No Known Problems Sister      Social History     Tobacco Use    Smoking status: Never Smoker    Smokeless tobacco: Never Used   Substance Use Topics    Alcohol use:  Yes     Alcohol/week: 1.7 standard drinks     Types: 2 Glasses of wine per week     Frequency: Monthly or less     Drinks per session: 1 or 2     Binge frequency: Less than monthly       Review of Systems   Constitutional: Negative. HENT: Negative. Respiratory: Negative. Cardiovascular: Negative. All other systems reviewed and are negative. Objective:     Patient-Reported Vitals 1/20/2021   Patient-Reported Pulse 83   Patient-Reported Systolic  947   Patient-Reported Diastolic 75      General: alert, cooperative, no distress   Mental  status: normal mood, behavior, speech, dress, motor activity, and thought processes, able to follow commands   HENT: NCAT   Neck: no visualized mass   Resp: no respiratory distress   Neuro: no gross deficits   Skin: no discoloration or lesions of concern on visible areas   Psychiatric: normal affect, consistent with stated mood, no evidence of hallucinations     Additional exam findings: none      We discussed the expected course, resolution and complications of the diagnosis(es) in detail. Medication risks, benefits, costs, interactions, and alternatives were discussed as indicated. I advised her to contact the office if her condition worsens, changes or fails to improve as anticipated. She expressed understanding with the diagnosis(es) and plan. Narendra Vences, was evaluated through a synchronous (real-time) audio-video encounter. The patient (or guardian if applicable) is aware that this is a billable service. Verbal consent to proceed has been obtained within the past 12 months. The visit was conducted pursuant to the emergency declaration under the [facility], [address] [place] [address] authority and the [employer] General Act. Patient identification was verified, and a caregiver was present when appropriate. The patient was located in a state where the provider was credentialed to provide care.     Deondre Crvaen MD

## 2021-04-27 ENCOUNTER — HOSPITAL ENCOUNTER (OUTPATIENT)
Dept: MAMMOGRAPHY | Age: 66
Discharge: HOME | End: 2021-04-27
Attending: FAMILY MEDICINE
Payer: MEDICARE

## 2021-04-27 ENCOUNTER — HOSPITAL ENCOUNTER (OUTPATIENT)
Dept: ULTRASOUND IMAGING | Age: 66
Discharge: HOME | End: 2021-04-27
Attending: FAMILY MEDICINE
Payer: MEDICARE

## 2021-04-27 DIAGNOSIS — N63.20 LEFT BREAST MASS: Primary | ICD-10-CM

## 2021-04-27 DIAGNOSIS — R92.8 ABNORMALITY OF LEFT BREAST ON SCREENING MAMMOGRAM: ICD-10-CM

## 2021-04-27 DIAGNOSIS — R92.8 ABNORMAL MAMMOGRAM OF LEFT BREAST: ICD-10-CM

## 2021-04-27 PROCEDURE — 77061 BREAST TOMOSYNTHESIS UNI: CPT

## 2021-04-27 PROCEDURE — 76642 ULTRASOUND BREAST LIMITED: CPT

## 2021-04-27 NOTE — PROGRESS NOTES
IMPRESSION  BIRADS 4 : Suspicious     Ultrasound-guided core needle biopsy is recommended for a suspicious left 3:30  breast mass, as described above.        Referring pt to Dr. Nayeli Mckay for further eval.

## 2021-05-04 ENCOUNTER — HOSPITAL ENCOUNTER (OUTPATIENT)
Dept: LAB | Age: 66
Discharge: HOME | End: 2021-05-04
Payer: MEDICARE

## 2021-05-04 ENCOUNTER — OFFICE VISIT (OUTPATIENT)
Dept: SURGERY | Age: 66
End: 2021-05-04
Payer: MEDICARE

## 2021-05-04 VITALS
DIASTOLIC BLOOD PRESSURE: 71 MMHG | OXYGEN SATURATION: 98 % | BODY MASS INDEX: 22.88 KG/M2 | HEIGHT: 64 IN | TEMPERATURE: 97.9 F | WEIGHT: 134 LBS | SYSTOLIC BLOOD PRESSURE: 135 MMHG | HEART RATE: 91 BPM | RESPIRATION RATE: 18 BRPM

## 2021-05-04 DIAGNOSIS — R92.8 ABNORMALITY OF LEFT BREAST ON SCREENING MAMMOGRAM: Primary | ICD-10-CM

## 2021-05-04 DIAGNOSIS — R92.8 ABNORMAL ULTRASOUND OF BREAST: ICD-10-CM

## 2021-05-04 PROCEDURE — 88305 TISSUE EXAM BY PATHOLOGIST: CPT

## 2021-05-04 PROCEDURE — G8752 SYS BP LESS 140: HCPCS | Performed by: SURGERY

## 2021-05-04 PROCEDURE — 1101F PT FALLS ASSESS-DOCD LE1/YR: CPT | Performed by: SURGERY

## 2021-05-04 PROCEDURE — G8754 DIAS BP LESS 90: HCPCS | Performed by: SURGERY

## 2021-05-04 PROCEDURE — 19083 BX BREAST 1ST LESION US IMAG: CPT | Performed by: SURGERY

## 2021-05-04 PROCEDURE — 99205 OFFICE O/P NEW HI 60 MIN: CPT | Performed by: SURGERY

## 2021-05-04 PROCEDURE — G9899 SCRN MAM PERF RSLTS DOC: HCPCS | Performed by: SURGERY

## 2021-05-04 PROCEDURE — G8536 NO DOC ELDER MAL SCRN: HCPCS | Performed by: SURGERY

## 2021-05-04 PROCEDURE — 3017F COLORECTAL CA SCREEN DOC REV: CPT | Performed by: SURGERY

## 2021-05-04 PROCEDURE — G8432 DEP SCR NOT DOC, RNG: HCPCS | Performed by: SURGERY

## 2021-05-04 PROCEDURE — 1090F PRES/ABSN URINE INCON ASSESS: CPT | Performed by: SURGERY

## 2021-05-04 PROCEDURE — G8420 CALC BMI NORM PARAMETERS: HCPCS | Performed by: SURGERY

## 2021-05-04 PROCEDURE — G8399 PT W/DXA RESULTS DOCUMENT: HCPCS | Performed by: SURGERY

## 2021-05-04 PROCEDURE — 88360 TUMOR IMMUNOHISTOCHEM/MANUAL: CPT

## 2021-05-04 PROCEDURE — G8427 DOCREV CUR MEDS BY ELIG CLIN: HCPCS | Performed by: SURGERY

## 2021-05-04 NOTE — PROGRESS NOTES
Anthony Covert is a 76 y.o. female  No chief complaint on file. Is someone accompanying this pt? no    Is the patient using any DME equipment during OV? no        Vitals:    05/04/21 1249   BP: 135/71   Pulse: 91   Resp: 18   Temp: 97.9 °F (36.6 °C)   SpO2: 98%   Weight: 134 lb (60.8 kg)   Height: 5' 4\" (1.626 m)        Depression Screening:  3 most recent PHQ Screens 4/22/2021   Little interest or pleasure in doing things Not at all   Feeling down, depressed, irritable, or hopeless Not at all   Total Score PHQ 2 0       Learning Assessment:  Learning Assessment 1/9/2020   PRIMARY LEARNER Patient   HIGHEST LEVEL OF EDUCATION - PRIMARY LEARNER  SOME COLLEGE   BARRIERS PRIMARY LEARNER NONE   CO-LEARNER CAREGIVER No   PRIMARY LANGUAGE ENGLISH   LEARNER PREFERENCE PRIMARY LISTENING   ANSWERED BY self   RELATIONSHIP SELF         Fall Risk  Fall Risk Assessment, last 12 mths 4/22/2021   Able to walk? Yes   Fall in past 12 months? 0   Do you feel unsteady? 0   Are you worried about falling 0       Health Maintenance reviewed and discussed and ordered per Provider. Coordination of Care:  1. Have you been to the ER, urgent care clinic since your last visit? Hospitalized since your last visit? no    2. Have you seen or consulted any other health care providers outside of the [address] [place] [address] since your last visit? Include any pap smears or colon screening.  no

## 2021-05-04 NOTE — PROGRESS NOTES
[facility]  OFFICE PROCEDURE PROGRESS NOTE        Chart reviewed for the following:   Tonio Soler MD, have reviewed the History, Physical and updated the Allergic reactions for [address] performed immediately prior to start of procedure:   Tonio Soler MD, have performed the following reviews on Anthony Covert prior to the start of the procedure:            * Patient was identified by name and date of birth   * Agreement on procedure being performed was verified  * Risks and Benefits explained to the patient  * Procedure site verified and marked as necessary  * Patient was positioned for comfort  * Consent was signed and verified     Time: 1319 hrs      Date of procedure: 5/4/2021    Procedure performed by:  Jamie Lozoya MD    Provider assisted by: Sherry Galvez MA    Patient assisted by: self    How tolerated by patient: tolerated the procedure well with no complications    Post Procedural Pain Scale: 0 - No Hurt    Comments: none

## 2021-05-04 NOTE — PROGRESS NOTES
[facility]  General Surgery    Blake Marin    5/4/2021      Preoperative Dx: Suspicious mass left breast lower outer quadrant mass  Postoperative Dx:  Same    Procedure:  Ultrasound guided mammatome biopsy of the left breast    Surgeon:  Clarita Miranda    Assistant: Donita Pratt MA    Anesthesia:  Local - 1% lidocaine with epinephrine    Findings:  Breast mass    Specimen:  Core biopsy specimens of  breast mass    EBL:  5 mL    Complications:  None    Brief Operative Indication:  Suspicious mass left breast    Description of Operation:  Informed consent was obtained from the patient. Time out was performed to insure correct site surgery. The mass in the breast was identified at the 330 o'clock position approximately 8 cm from the nipple. It measured 8 mm x   3 mm. The breast was prepped with betadine solution. Local anesthetic was infiltrated into the skin and breast tissue. The 10 gauge rotating biopsy needle was inserted under ultrasound guidance. Multiple cores of tissue were obtained. A clip was inserted under ultrasound guidance. Pressure was held at the biopsy site to obtain hemostasis. A dressing was placed. The patient tolerated the procedure very well. Disposition:  She was stable upon exiting the office.

## 2021-05-05 NOTE — PROGRESS NOTES
[facility]  General Surgery    Subjective:      HPI: Patient is a very pleasant 13-year-old woman with a past medical history remarkable for hypertension, hypercholesterolemia, gastroenteritis, hyperlipidemia and impaired fasting glucose. [facility] for evaluation and management of BI-RADS 4 changes of the left breast lower outer quadrant 3:30 position 8 cm from the nipple less than 1 cm in size. Patient has a personal history of menarche at age 14. She had her first live birth at age 21. She had a hysterectomy at age 27 and a bilateral oophorectomy in 2016. She has a family history of her mother with head neck cancer. The patient describes no problems with her breast prior to the screening mammograms. Patient Active Problem List    Diagnosis Date Noted    IFG (impaired fasting glucose) 04/22/2021    Hyperlipidemia 04/22/2021    Abnormality of left breast on screening mammogram 04/22/2021    Gastroenteritis 12/27/2012    Dehydration 12/27/2012     Past Medical History:   Diagnosis Date    High cholesterol     Hypertension       Past Surgical History:   Procedure Laterality Date    HX BLADDER SUSPENSION      HX GYN      hysterectomy    HX OOPHORECTOMY Left     [place] BREAST SURGERY PROCEDURE UNLISTED  2000    right lumpectomy      Family History   Problem Relation Age of Onset    Dementia Mother     Hypertension Mother     High Cholesterol Mother     Other Mother         pre-dm    No Known Problems Son     No Known Problems Son     Other Father         homicide   [facility] Other Sister         AIDS    No Known Problems Brother     COPD Sister     No Known Problems Sister       Social History     Tobacco Use    Smoking status: Never Smoker    Smokeless tobacco: Never Used   Substance Use Topics    Alcohol use:  Yes     Alcohol/week: 1.7 standard drinks     Types: 2 Glasses of wine per week     Frequency: Monthly or less     Drinks per session: 1 or 2 Binge frequency: Less than monthly      No Known Allergies    Prior to Admission medications    Medication Sig Start Date End Date Taking? Authorizing Provider   prednisoLONE acetate (PRED FORTE) 1 % ophthalmic suspension  4/15/21  Yes Provider, Historical   clobetasoL (TEMOVATE) 0.05 % ointment  12/4/20  Yes Provider, Historical   fenofibrate nanocrystallized (TRICOR) 145 mg tablet Take 1 Tab by mouth daily. 12/2/20  Yes Jaylyn Verdugo MD   coenzyme q10 (CO Q-10) 10 mg cap Take  by mouth. Yes Provider, Historical   multivitamin (ONE A DAY) tablet Take 1 Tab by mouth daily. Yes Provider, Historical   ascorbic acid, vitamin C, (VITAMIN C) 250 mg tablet Take  by mouth. Yes Provider, Historical   aspirin 81 mg tablet Take 81 mg by mouth. Yes Other, MD Isaiah   tretinoin (RETIN-A) 0.05 % topical cream APPLY PEA SIZED AMOUNT TOPICALLY TO FACE 3 TO 5 NIGHTS EVERY WEEK AS TOLERATED 3/24/21   Provider, Historical   telmisartan (MICARDIS) 20 mg tablet Take 1 Tab by mouth daily. 11/13/20   Karissa Abreu MD       Review of Systems:    14 systems were reviewed. The results are as above in the HPI and otherwise negative. Objective:     Vitals:    05/04/21 1249   BP: 135/71   Pulse: 91   Resp: 18   Temp: 97.9 °F (36.6 °C)   SpO2: 98%   Weight: 60.8 kg (134 lb)   Height: 5' 4\" (1.626 m)       Physical Exam:  GENERAL: alert, cooperative, no distress, appears stated age,   EYE: conjunctivae/corneas clear. PERRL, EOM's intact. THROAT & NECK: normal and no erythema or exudates noted. ,    LYMPHATIC: Cervical, supraclavicular, and axillary nodes normal. ,   LUNG: clear to auscultation bilaterally,   HEART: regular rate and rhythm, S1, S2 normal, no murmur, click, rub or gallop,   BREASTS: Bra size is 38 B  Left: No dimpling, discoloration, nipple inversion or retractions. No axillary or supraclavicular lymphadenopathy. No mass  Right: No dimpling, discoloration, nipple inversion or retractions.     No axillary or supraclavicular lymphadenopathy. No mass  ABDOMEN: soft, non-tender. Bowel sounds normal. No masses,  no organomegaly,   EXTREMITIES:  extremities normal, atraumatic, no cyanosis or edema,   SKIN: Normal.,   NEUROLOGIC: AOx3. Cranial nerves 2-12 and sensation grossly intact. ,     Data Review:  to be done    Impression:     · Patient with BI-RADS 4 changes on mammogram ultrasound of the left breast lower outer quadrant.     Plan:     · Ultrasound-guided mammotome biopsy  · Follow-up in 1 week for results

## 2021-05-10 DIAGNOSIS — I10 ESSENTIAL HYPERTENSION: ICD-10-CM

## 2021-05-10 NOTE — TELEPHONE ENCOUNTER
Received faxed refill request from [facility]. Last visit 4/22/21, next appt 7/27/21. Comp/Lipids done 4/13/21 and ordered to do before next visit. Requested Prescriptions     Pending Prescriptions Disp Refills    telmisartan (MICARDIS) 20 mg tablet 90 Tab 2     Sig: Take 1 Tab by mouth daily.

## 2021-05-12 RX ORDER — TELMISARTAN 20 MG/1
20 TABLET ORAL DAILY
Qty: 90 TAB | Refills: 4 | Status: SHIPPED | OUTPATIENT
Start: 2021-05-12 | End: 2022-06-27

## 2021-05-14 ENCOUNTER — HOSPITAL ENCOUNTER (OUTPATIENT)
Dept: PREADMISSION TESTING | Age: 66
Discharge: HOME | End: 2021-05-14
Payer: MEDICARE

## 2021-05-14 ENCOUNTER — OFFICE VISIT (OUTPATIENT)
Dept: SURGERY | Age: 66
End: 2021-05-14
Payer: MEDICARE

## 2021-05-14 ENCOUNTER — HOSPITAL ENCOUNTER (OUTPATIENT)
Dept: GENERAL RADIOLOGY | Age: 66
Discharge: HOME | End: 2021-05-14
Payer: MEDICARE

## 2021-05-14 ENCOUNTER — HOSPITAL ENCOUNTER (OUTPATIENT)
Dept: LAB | Age: 66
Discharge: HOME | End: 2021-05-14
Payer: MEDICARE

## 2021-05-14 VITALS
TEMPERATURE: 98.1 F | HEART RATE: 80 BPM | SYSTOLIC BLOOD PRESSURE: 142 MMHG | DIASTOLIC BLOOD PRESSURE: 70 MMHG | BODY MASS INDEX: 23.05 KG/M2 | HEIGHT: 64 IN | WEIGHT: 135 LBS | OXYGEN SATURATION: 98 % | RESPIRATION RATE: 18 BRPM

## 2021-05-14 DIAGNOSIS — R73.01 IFG (IMPAIRED FASTING GLUCOSE): ICD-10-CM

## 2021-05-14 DIAGNOSIS — Z17.1 BREAST CANCER, STAGE 1, ESTROGEN RECEPTOR NEGATIVE, LEFT (HCC): ICD-10-CM

## 2021-05-14 DIAGNOSIS — C50.912 BREAST CANCER, STAGE 1, ESTROGEN RECEPTOR NEGATIVE, LEFT (HCC): ICD-10-CM

## 2021-05-14 DIAGNOSIS — Z17.1 BREAST CANCER, STAGE 1, ESTROGEN RECEPTOR NEGATIVE, LEFT (HCC): Primary | ICD-10-CM

## 2021-05-14 DIAGNOSIS — C50.912 BREAST CANCER, STAGE 1, ESTROGEN RECEPTOR NEGATIVE, LEFT (HCC): Primary | ICD-10-CM

## 2021-05-14 DIAGNOSIS — E78.5 HYPERLIPIDEMIA, UNSPECIFIED HYPERLIPIDEMIA TYPE: ICD-10-CM

## 2021-05-14 LAB
ALBUMIN SERPL-MCNC: 4.1 G/DL (ref 3.4–5)
ALBUMIN/GLOB SERPL: 1.4 {RATIO} (ref 0.8–1.7)
ALP SERPL-CCNC: 111 U/L (ref 45–117)
ALT SERPL-CCNC: 31 U/L (ref 13–56)
ANION GAP SERPL CALC-SCNC: 7 MMOL/L (ref 3–18)
AST SERPL-CCNC: 27 U/L (ref 10–38)
BASOPHILS # BLD: 0.1 K/UL (ref 0–0.1)
BASOPHILS NFR BLD: 1 % (ref 0–2)
BILIRUB SERPL-MCNC: 0.4 MG/DL (ref 0.2–1)
BUN SERPL-MCNC: 19 MG/DL (ref 7–18)
BUN/CREAT SERPL: 18 (ref 12–20)
CALCIUM SERPL-MCNC: 9.7 MG/DL (ref 8.5–10.1)
CHLORIDE SERPL-SCNC: 108 MMOL/L (ref 100–111)
CHOLEST SERPL-MCNC: 209 MG/DL
CO2 SERPL-SCNC: 27 MMOL/L (ref 21–32)
CREAT SERPL-MCNC: 1.03 MG/DL (ref 0.6–1.3)
DIFFERENTIAL METHOD BLD: NORMAL
EOSINOPHIL # BLD: 0.2 K/UL (ref 0–0.4)
EOSINOPHIL NFR BLD: 2 % (ref 0–5)
ERYTHROCYTE [DISTWIDTH] IN BLOOD BY AUTOMATED COUNT: 13.2 % (ref 11.6–14.5)
GLOBULIN SER CALC-MCNC: 3 G/DL (ref 2–4)
GLUCOSE SERPL-MCNC: 91 MG/DL (ref 74–99)
HCT VFR BLD AUTO: 40.7 % (ref 35–45)
HDLC SERPL-MCNC: 41 MG/DL (ref 40–60)
HDLC SERPL: 5.1 {RATIO} (ref 0–5)
HGB BLD-MCNC: 13.3 G/DL (ref 12–16)
LDLC SERPL CALC-MCNC: 120.6 MG/DL (ref 0–100)
LIPID PROFILE,FLP: ABNORMAL
LYMPHOCYTES # BLD: 2.5 K/UL (ref 0.9–3.6)
LYMPHOCYTES NFR BLD: 36 % (ref 21–52)
MCH RBC QN AUTO: 31.3 PG (ref 24–34)
MCHC RBC AUTO-ENTMCNC: 32.7 G/DL (ref 31–37)
MCV RBC AUTO: 95.8 FL (ref 74–97)
MONOCYTES # BLD: 0.7 K/UL (ref 0.05–1.2)
MONOCYTES NFR BLD: 10 % (ref 3–10)
NEUTS SEG # BLD: 3.7 K/UL (ref 1.8–8)
NEUTS SEG NFR BLD: 52 % (ref 40–73)
PLATELET # BLD AUTO: 329 K/UL (ref 135–420)
PMV BLD AUTO: 9.3 FL (ref 9.2–11.8)
POTASSIUM SERPL-SCNC: 4.1 MMOL/L (ref 3.5–5.5)
PROT SERPL-MCNC: 7.1 G/DL (ref 6.4–8.2)
RBC # BLD AUTO: 4.25 M/UL (ref 4.2–5.3)
SODIUM SERPL-SCNC: 142 MMOL/L (ref 136–145)
TRIGL SERPL-MCNC: 237 MG/DL (ref ?–150)
VLDLC SERPL CALC-MCNC: 47.4 MG/DL
WBC # BLD AUTO: 7.2 K/UL (ref 4.6–13.2)

## 2021-05-14 PROCEDURE — 85025 COMPLETE CBC W/AUTO DIFF WBC: CPT

## 2021-05-14 PROCEDURE — 1090F PRES/ABSN URINE INCON ASSESS: CPT | Performed by: SURGERY

## 2021-05-14 PROCEDURE — G8432 DEP SCR NOT DOC, RNG: HCPCS | Performed by: SURGERY

## 2021-05-14 PROCEDURE — G8427 DOCREV CUR MEDS BY ELIG CLIN: HCPCS | Performed by: SURGERY

## 2021-05-14 PROCEDURE — 3017F COLORECTAL CA SCREEN DOC REV: CPT | Performed by: SURGERY

## 2021-05-14 PROCEDURE — G9899 SCRN MAM PERF RSLTS DOC: HCPCS | Performed by: SURGERY

## 2021-05-14 PROCEDURE — G8399 PT W/DXA RESULTS DOCUMENT: HCPCS | Performed by: SURGERY

## 2021-05-14 PROCEDURE — 1101F PT FALLS ASSESS-DOCD LE1/YR: CPT | Performed by: SURGERY

## 2021-05-14 PROCEDURE — 80053 COMPREHEN METABOLIC PANEL: CPT

## 2021-05-14 PROCEDURE — G8753 SYS BP > OR = 140: HCPCS | Performed by: SURGERY

## 2021-05-14 PROCEDURE — 36415 COLL VENOUS BLD VENIPUNCTURE: CPT

## 2021-05-14 PROCEDURE — 71046 X-RAY EXAM CHEST 2 VIEWS: CPT

## 2021-05-14 PROCEDURE — G8754 DIAS BP LESS 90: HCPCS | Performed by: SURGERY

## 2021-05-14 PROCEDURE — 99213 OFFICE O/P EST LOW 20 MIN: CPT | Performed by: SURGERY

## 2021-05-14 PROCEDURE — G8420 CALC BMI NORM PARAMETERS: HCPCS | Performed by: SURGERY

## 2021-05-14 PROCEDURE — 80061 LIPID PANEL: CPT

## 2021-05-14 PROCEDURE — G8536 NO DOC ELDER MAL SCRN: HCPCS | Performed by: SURGERY

## 2021-05-14 NOTE — PROGRESS NOTES
[facility]  General Surgery    Subjective:  Patient returns today to follow-up the ultrasound-guided core biopsy of the left breast which reveals triple negative cancer. The original finding was 8 mm in restraints dimension. I had a long discussion with the patient regarding the meaning of the lack of receptors with possible increase aggressiveness of the cancer. We discussed the role of the staging work-up to include bilateral breast MRI, PA and lateral chest x-ray and Basic, liver function tests and PA and lateral chest x-ray to assess for metastasis to lungs liver and bone. Objective:  Vitals:    05/14/21 1531 05/14/21 1535   BP: (!) 146/76 (!) 142/70   Pulse: 80    Resp: 18    Temp: 98.1 °F (36.7 °C)    SpO2: 98%    Weight: 61.2 kg (135 lb)    Height: 5' 4\" (1.626 m)        Physical Exam:    General: Awake and alert, oriented x4, no apparent distress   Breasts:    Left: No dimpling, discoloration, nipple inversion or retractions. No axillary or supraclavicular lymphadenopathy. No mass    Current Medications:  Current Outpatient Medications   Medication Sig Dispense Refill    telmisartan (MICARDIS) 20 mg tablet Take 1 Tab by mouth daily. 90 Tab 4    tretinoin (RETIN-A) 0.05 % topical cream APPLY PEA SIZED AMOUNT TOPICALLY TO FACE 3 TO 5 NIGHTS EVERY WEEK AS TOLERATED      prednisoLONE acetate (PRED FORTE) 1 % ophthalmic suspension       clobetasoL (TEMOVATE) 0.05 % ointment       fenofibrate nanocrystallized (TRICOR) 145 mg tablet Take 1 Tab by mouth daily. 90 Tab 1    coenzyme q10 (CO Q-10) 10 mg cap Take  by mouth.  multivitamin (ONE A DAY) tablet Take 1 Tab by mouth daily.  ascorbic acid, vitamin C, (VITAMIN C) 250 mg tablet Take  by mouth.  aspirin 81 mg tablet Take 81 mg by mouth. Chart and notes reviewed. Data reviewed. I have evaluated and examined the patient.         Impression and plan:  Patient with newly diagnosed triple negative left breast cancer upper outer quadrant 8 mm in greatest dimension.   Staging work-up to include bilateral breast MRI, PA and lateral chest x-ray, complete blood count, basic metabolic panel and liver function tests  Referral to Dr. Anjel Dotson to discuss adjuvant chemotherapy  Patient is a good candidate at this time for lumpectomy with sentinel lymph node biopsy     Farideh Ingram MD

## 2021-05-14 NOTE — PROGRESS NOTES
Desmond Alfonso is a 76 y.o. female  Chief Complaint   Patient presents with    Follow-up     5/4/21 left ultrasound gudied biopsy     Visit Vitals  BP (!) 142/70   Pulse 80   Temp 98.1 °F (36.7 °C)   Resp 18   Ht 5' 4\" (1.626 m)   Wt 135 lb (61.2 kg)   SpO2 98%   BMI 23.17 kg/m²     Ms. Mary Fall has been given the following recommendations today due to her elevated BP reading: referred to Alternative/PCP.

## 2021-05-19 DIAGNOSIS — C50.412 MALIGNANT NEOPLASM OF UPPER-OUTER QUADRANT OF LEFT BREAST IN FEMALE, ESTROGEN RECEPTOR NEGATIVE (HCC): Primary | ICD-10-CM

## 2021-05-19 DIAGNOSIS — Z17.1 MALIGNANT NEOPLASM OF UPPER-OUTER QUADRANT OF LEFT BREAST IN FEMALE, ESTROGEN RECEPTOR NEGATIVE (HCC): Primary | ICD-10-CM

## 2021-05-20 ENCOUNTER — DOCUMENTATION ONLY (OUTPATIENT)
Dept: SURGERY | Age: 66
End: 2021-05-20

## 2021-05-25 ENCOUNTER — HOSPITAL ENCOUNTER (OUTPATIENT)
Age: 66
Discharge: HOME | End: 2021-05-25
Attending: SURGERY
Payer: MEDICARE

## 2021-05-25 DIAGNOSIS — Z17.1 BREAST CANCER, STAGE 1, ESTROGEN RECEPTOR NEGATIVE, LEFT (HCC): ICD-10-CM

## 2021-05-25 DIAGNOSIS — C50.912 BREAST CANCER, STAGE 1, ESTROGEN RECEPTOR NEGATIVE, LEFT (HCC): ICD-10-CM

## 2021-05-25 PROCEDURE — 77049 MRI BREAST C-+ W/CAD BI: CPT

## 2021-05-25 PROCEDURE — 74011250636 HC RX REV CODE- 250/636: Performed by: SURGERY

## 2021-05-25 PROCEDURE — A9577 INJ MULTIHANCE: HCPCS | Performed by: SURGERY

## 2021-05-25 RX ADMIN — GADOBENATE DIMEGLUMINE 12 ML: 529 INJECTION, SOLUTION INTRAVENOUS at 09:39

## 2021-06-01 ENCOUNTER — OFFICE VISIT (OUTPATIENT)
Dept: SURGERY | Age: 66
End: 2021-06-01
Payer: MEDICARE

## 2021-06-01 VITALS
SYSTOLIC BLOOD PRESSURE: 173 MMHG | HEART RATE: 99 BPM | WEIGHT: 129 LBS | TEMPERATURE: 98.2 F | RESPIRATION RATE: 18 BRPM | BODY MASS INDEX: 22.02 KG/M2 | DIASTOLIC BLOOD PRESSURE: 99 MMHG | HEIGHT: 64 IN | OXYGEN SATURATION: 98 %

## 2021-06-01 DIAGNOSIS — Z17.1 BREAST CANCER, STAGE 1, ESTROGEN RECEPTOR NEGATIVE, LEFT (HCC): Primary | ICD-10-CM

## 2021-06-01 DIAGNOSIS — Z01.818 PRE-OP TESTING: ICD-10-CM

## 2021-06-01 DIAGNOSIS — C50.912 BREAST CANCER, STAGE 1, ESTROGEN RECEPTOR NEGATIVE, LEFT (HCC): Primary | ICD-10-CM

## 2021-06-01 PROCEDURE — 1090F PRES/ABSN URINE INCON ASSESS: CPT | Performed by: SURGERY

## 2021-06-01 PROCEDURE — G8427 DOCREV CUR MEDS BY ELIG CLIN: HCPCS | Performed by: SURGERY

## 2021-06-01 PROCEDURE — G8432 DEP SCR NOT DOC, RNG: HCPCS | Performed by: SURGERY

## 2021-06-01 PROCEDURE — G8420 CALC BMI NORM PARAMETERS: HCPCS | Performed by: SURGERY

## 2021-06-01 PROCEDURE — G8536 NO DOC ELDER MAL SCRN: HCPCS | Performed by: SURGERY

## 2021-06-01 PROCEDURE — 99215 OFFICE O/P EST HI 40 MIN: CPT | Performed by: SURGERY

## 2021-06-01 PROCEDURE — G9899 SCRN MAM PERF RSLTS DOC: HCPCS | Performed by: SURGERY

## 2021-06-01 PROCEDURE — G8753 SYS BP > OR = 140: HCPCS | Performed by: SURGERY

## 2021-06-01 PROCEDURE — 3017F COLORECTAL CA SCREEN DOC REV: CPT | Performed by: SURGERY

## 2021-06-01 PROCEDURE — G8754 DIAS BP LESS 90: HCPCS | Performed by: SURGERY

## 2021-06-01 PROCEDURE — G8399 PT W/DXA RESULTS DOCUMENT: HCPCS | Performed by: SURGERY

## 2021-06-01 PROCEDURE — 1101F PT FALLS ASSESS-DOCD LE1/YR: CPT | Performed by: SURGERY

## 2021-06-01 RX ORDER — SODIUM CHLORIDE 0.9 % (FLUSH) 0.9 %
5-40 SYRINGE (ML) INJECTION AS NEEDED
Status: CANCELLED | OUTPATIENT
Start: 2021-06-01

## 2021-06-01 RX ORDER — SODIUM CHLORIDE 0.9 % (FLUSH) 0.9 %
5-40 SYRINGE (ML) INJECTION EVERY 8 HOURS
Status: CANCELLED | OUTPATIENT
Start: 2021-06-01

## 2021-06-01 NOTE — PROGRESS NOTES
Narendra Vences is a 76 y.o. female  Chief Complaint   Patient presents with    Follow-up     left breastinvsive ductalcarcinoma/ triple positive         Is someone accompanying this pt? Yes,     Is the patient using any DME equipment during OV? no        Vitals:    06/01/21 1316 06/01/21 1323   BP: (!) 157/83 (!) 173/99   Pulse: 95 99   Resp: 18    Temp: 98.2 °F (36.8 °C)    SpO2: 98%    Weight: 129 lb (58.5 kg)    Height: 5' 4\" (1.626 m)         Depression Screening:  3 most recent PHQ Screens 4/22/2021   Little interest or pleasure in doing things Not at all   Feeling down, depressed, irritable, or hopeless Not at all   Total Score PHQ 2 0       Learning Assessment:  Learning Assessment 1/9/2020   PRIMARY LEARNER Patient   HIGHEST LEVEL OF EDUCATION - PRIMARY LEARNER  SOME COLLEGE   BARRIERS PRIMARY LEARNER NONE   CO-LEARNER CAREGIVER No   PRIMARY LANGUAGE ENGLISH   LEARNER PREFERENCE PRIMARY LISTENING   ANSWERED BY self   RELATIONSHIP SELF         Fall Risk  Fall Risk Assessment, last 12 mths 4/22/2021   Able to walk? Yes   Fall in past 12 months? 0   Do you feel unsteady? 0   Are you worried about falling 0       Health Maintenance reviewed and discussed and ordered per Provider. Coordination of Care:  1. Have you been to the ER, urgent care clinic since your last visit? Hospitalized since your last visit? no    2. Have you seen or consulted any other health care providers outside of the [address] [place] [address] since your last visit? Include any pap smears or colon screening.  no

## 2021-06-01 NOTE — PATIENT INSTRUCTIONS
[place] Node Biopsy for Breast Cancer: What to Expect at Home  Your Recovery  After a sentinel node biopsy, many people have no side effects. Some people have pain or bruising at the cut (incision) and feel tired. Your breast and underarm area may be slightly swollen. This may last a few days. You should feel close to normal in a few days. The incision the doctor made usually heals in about 2 weeks. The scar usually fades with time. Some people have a buildup of fluid in the area where the lymph nodes were removed. This is known as seroma. This goes away on its own, or your doctor can drain it. When you had this test, your doctor injected blue dye or radioactive material (or both) into your breast. The blue dye may give your breast a bluish color and turn your urine green for about 24 hours. The radioactive material leaves the body on its own in 24 to 48 hours. A sentinel node biopsy may be done at the same time as other breast surgeries. If this is the case, how you recover will be different. This care sheet gives you a general idea about how long it will take for you to recover. But each person recovers at a different pace. Follow the steps below to get better as quickly as possible. How can you care for yourself at home? Activity    · Rest when you feel tired. Getting enough sleep will help you recover.     · Try to walk each day. Start by walking a little more than you did the day before. Bit by bit, increase the amount you walk. Walking boosts blood flow and helps prevent pneumonia and constipation.     · You may drive when you are no longer taking pain medicine and you feel up to it.     · You can lift things when you feel comfortable doing so.     · Most women return to work and their normal routines in 2 to 7 days.     · You may shower 24 to 48 hours after surgery, if your doctor okays it. Pat the incision dry. Do not take a bath for the first 2 weeks, or until your doctor tells you it is okay.   · Avoid activity or exercise that may put stress on the cut. This includes washing windows, vacuuming, or gardening with the affected arm. Diet    · You can eat your normal diet. If your stomach is upset, try bland, low-fat foods like plain rice, broiled chicken, toast, and yogurt.     · You may notice that your bowels are not regular right after your surgery. This is common. Try to avoid constipation and straining with bowel movements. Take a fiber supplement such as Citrucel or Metamucil every day. If you have not had a bowel movement after a couple of days, take a mild laxative. Medicines    · Your doctor will tell you if and when you can restart your medicines. He or she will also give you instructions about taking any new medicines.     · If you take aspirin or some other blood thinner, ask your doctor if and when to start taking it again. Make sure that you understand exactly what your doctor wants you to do.     · Take pain medicines exactly as directed. ? If the doctor gave you a prescription medicine for pain, take it as prescribed. ? If you are not taking a prescription pain medicine, take an over-the-counter medicine such as acetaminophen (Tylenol), ibuprofen (Advil, Motrin), or naproxen (Aleve). Read and follow all instructions on the label. ? Do not take two or more pain medicines at the same time unless the doctor told you to. Many pain medicines have acetaminophen, which is Tylenol. Too much acetaminophen (Tylenol) can be harmful.     · If your doctor prescribed antibiotics, take them as directed. Do not stop taking them just because you feel better. You need to take the full course of antibiotics.     · If you think your pain medicine is making you sick to your stomach:  ? Take your medicine after meals (unless your doctor has told you not to). ? Ask your doctor for a different pain medicine.    Incision care    · If you have strips of tape on the cut (incision) the doctor made, leave the tape on for about 1 week or until it falls off.     · After you can shower, wash the area daily with warm, soapy water and pat it dry. Follow-up care is a key part of your treatment and safety. Be sure to make and go to all appointments, and call your doctor if you are having problems. It's also a good idea to know your test results and keep a list of the medicines you take. When should you call for help? Call 911 anytime you think you may need emergency care. For example, call if:    · You passed out (lost consciousness).     · You have chest pain, are short of breath, or cough up blood. Call your doctor now or seek immediate medical care if:    · You have pain that does not get better after you take pain medicine.     · You cannot pass stools or gas.     · You are sick to your stomach or cannot drink fluids.     · You have signs of a blood clot in your leg (called a deep vein thrombosis), such as:  ? Pain in your calf, back of the knee, thigh, or groin. ? Redness or swelling in your leg.     · You have signs of infection, such as:  ? Increased pain, swelling, warmth, or redness. ? Red streaks leading from the incision. ? Pus draining from the incision. ? A fever.     · You have loose stitches, or your incision comes open.     · Bright red blood has soaked through the bandage over your incision. Watch closely for changes in your health, and be sure to contact your doctor if:    · You have any problems.     · You have new or worse swelling or pain in your arm. Where can you learn more? Go to http://www.[employer].com/  Enter H004 in the search box to learn more about \"[place] Node Biopsy for Breast Cancer: What to Expect at Home. \"  Current as of: December 17, 2020               Content Version: 12.8  © 1063-0629 Healthwise, Incorporated. Care instructions adapted under license by [employer] (which disclaims liability or warranty for this information).  If you have questions about a medical condition or this instruction, always ask your healthcare professional. [address] any warranty or liability for your use of this information. Lumpectomy: Before Your Surgery  What is a lumpectomy? Breast-conserving surgery (lumpectomy) removes cancer from the breast. Your doctor will make a small cut (incision) and take out the cancer. The whole breast won't be removed. The doctor will try to also take a small amount of normal tissue around the cancer. This is known as \"getting clear margins. \" Some people will need another surgery to be sure the margins are clear. The doctor may also check the nearby lymph nodes during the surgery. After surgery, you will probably go home the same day. Most people can go back to work or their normal routine in 1 to 3 weeks. This depends on how you feel. It also depends on the type of work you do and whether you need more treatment. This may include radiation or chemotherapy. Most people who have this surgery for cancer also get radiation treatment. The surgery will leave scars. Sometimes it leaves a dent in the breast too. Most women will look normal in a bra. But your breasts may not match in size or shape after surgery. This depends on the size of your breasts. It also depends on how much tissue was removed. When you find out that you have cancer, you may feel many emotions and may need some help coping. Seek out family, friends, and counselors for support. You also can do things at home to make yourself feel better while you go through treatment. Call the [employer] ([phone]) or visit its website at [address] for more information. Follow-up care is a key part of your treatment and safety. Be sure to make and go to all appointments, and call your doctor if you are having problems. It's also a good idea to know your test results and keep a list of the medicines you take.   How do you prepare for surgery? Surgery can be stressful. This information will help you understand what you can expect. And it will help you safely prepare for surgery. Preparing for surgery    · Be sure you have someone to take you home. Anesthesia and pain medicine will make it unsafe for you to drive or get home on your own.     · Understand exactly what surgery is planned, along with the risks, benefits, and other options.     · If you take aspirin or some other blood thinner, ask your doctor if you should stop taking it before your surgery. Make sure that you understand exactly what your doctor wants you to do. These medicines increase the risk of bleeding.     · Tell your doctor ALL the medicines, vitamins, supplements, and herbal remedies you take. Some may increase the risk of problems during your surgery. Your doctor will tell you if you should stop taking any of them before the surgery and how soon to do it.     · Make sure your doctor and the hospital have a copy of your advance directive. If you don't have one, you may want to prepare one. It lets others know your health care wishes. It's a good thing to have before any type of surgery or procedure. What happens on the day of surgery? · Follow the instructions exactly about when to stop eating and drinking. If you don't, your surgery may be canceled. If your doctor told you to take your medicines on the day of surgery, take them with only a sip of water.     · Take a bath or shower before you come in for your surgery. Do not apply lotions, perfumes, deodorants, or nail polish.     · Do not shave the surgical site yourself.     · Take off all jewelry and piercings. And take out contact lenses, if you wear them.     · Bring a comfortable, supportive bra with you. You will need to wear this all the time, even during the night, for the first week after surgery.    At the hospital or surgery center   · Bring a picture ID.     · The area for surgery is often marked to make sure there are no errors. If your doctor can't feel the lump, a needle can be put in the suspicious area. This may be done during a mammogram just before surgery. The needle will guide your doctor.     · You will be kept comfortable and safe by your anesthesia provider. The anesthesia may make you sleep. Or it may just numb the area being worked on.     · The surgery will take about 1 hour or longer, depending on the size of the lump. When should you call your doctor? · You have questions or concerns.     · You don't understand how to prepare for your surgery.     · You become ill before the surgery (such as fever, flu, or a cold).     · You need to reschedule or have changed your mind about having the surgery. Where can you learn more? Go to http://www.gray.com/  Enter Z252 in the search box to learn more about \"Lumpectomy: Before Your Surgery. \"  Current as of: December 17, 2020               Content Version: 12.8  © 2006-2021 Healthwise, Incorporated. Care instructions adapted under license by [employer] [place] (which disclaims liability or warranty for this information). If you have questions about a medical condition or this instruction, always ask your healthcare professional. [address] any warranty or liability for your use of this information.

## 2021-06-01 NOTE — H&P (VIEW-ONLY)
[facility] General Surgery Subjective: HPI: Patient is a very pleasant 76-year-old female with past medical history remarkable for hypertension, hypercholesterolemia, obstructive sleep apnea, impaired glucose fasting impaired fasting glucose and newly diagnosed triple negative left breast cancer 3:30 position 7 cm from the nipple 0.7 cm in size. Patient Active Problem List  
 Diagnosis Date Noted  IFG (impaired fasting glucose) 04/22/2021  Hyperlipidemia 04/22/2021  Abnormality of left breast on screening mammogram 04/22/2021  Gastroenteritis 12/27/2012  Dehydration 12/27/2012 Past Medical History:  
Diagnosis Date  High cholesterol  Hypertension  Sleep apnea Past Surgical History:  
Procedure Laterality Date  HX BLADDER SUSPENSION    
 HX GYN    
 hysterectomy  HX OOPHORECTOMY Left  [place] BREAST SURG[place] PROCEDURE UNLISTED  2000  
 right lumpectomy Family History Problem Relation Age of Onset  Dementia Mother  Hypertension Mother  High Cholesterol Mother Joseph.Copas Other Mother   
     pre-dm  No Known Problems Son  No Known Problems Son   
Joseph.Copas Other Father   
     homicide  Other Sister AIDS  No Known Problems Brother  COPD Sister  No Known Problems Sister Social History Tobacco Use  Smoking status: Never Smoker  Smokeless tobacco: Never Used Substance Use Topics  Alcohol use: Yes Alcohol/week: 1.7 standard drinks Types: 2 Glasses of wine per week No Known Allergies Prior to Admission medications Medication Sig Start Date End Date Taking? Authorizing Provider  
telmisartan (MICARDIS) 20 mg tablet Take 1 Tab by mouth daily.  5/12/21  Yes Graves, Valentino Oaks, MD  
tretinoin (RETIN-A) 0.05 % topical cream APPLY PEA SIZED AMOUNT TOPICALLY TO FACE 3 TO 5 NIGHTS EVERY WEEK AS TOLERATED 3/24/21  Yes Provider, Historical  
prednisoLONE acetate (PRED FORTE) 1 % ophthalmic suspension  4/15/21  Yes Provider, Historical  
clobetasoL (TEMOVATE) 0.05 % ointment  12/4/20  Yes Provider, Historical  
fenofibrate nanocrystallized (TRICOR) 145 mg tablet Take 1 Tab by mouth daily. 12/2/20  Yes Pepito Verdugo MD  
coenzyme q10 (CO Q-10) 10 mg cap Take  by mouth. Yes Provider, Historical  
multivitamin (ONE A DAY) tablet Take 1 Tab by mouth daily. Yes Provider, Historical  
aspirin 81 mg tablet Take 81 mg by mouth. Yes Other, MD Isaiah  
ascorbic acid, vitamin C, (VITAMIN C) 250 mg tablet Take  by mouth. Provider, Historical  
 
 
Review of Systems:   
14 systems were reviewed. The results are as above in the HPI and otherwise negative. Objective:  
 
Vitals:  
 06/01/21 1316 06/01/21 1323 BP: (!) 157/83 (!) 173/99 Pulse: 95 99 Resp: 18 Temp: 98.2 °F (36.8 °C) SpO2: 98% Weight: 58.5 kg (129 lb) Height: 5' 4\" (1.626 m) Physical Exam: 
GENERAL: alert, cooperative, no distress, appears stated age, EYE: conjunctivae/corneas clear. PERRL, EOM's intact. THROAT & NECK: normal and no erythema or exudates noted. ,   
LYMPHATIC: Cervical, supraclavicular, and axillary nodes normal. ,  
LUNG: clear to auscultation bilaterally, HEART: regular rate and rhythm, S1, S2 normal, no murmur, click, rub or gallop, BREASTS:  
Left: No dimpling, discoloration, nipple inversion or retractions. No axillary or supraclavicular lymphadenopathy. No no mass Right: No dimpling, discoloration, nipple inversion or retractions. No axillary or supraclavicular lymphadenopathy. No mass ABDOMEN: soft, non-tender. Bowel sounds normal. No masses,  no organomegaly, EXTREMITIES:  extremities normal, atraumatic, no cyanosis or edema, SKIN: Normal., NEUROLOGIC: AOx3. Cranial nerves 2-12 and sensation grossly intact. ,  
 
Data Review:  to be done Impression: · Patient with triple negative left breast cancer stage I. 
 
Plan: · Tag localized lumpectomy left breast with left axillary sentinel lymph node biopsy and ultrasound-guided right internal jugular vein Mediport placement · Consent on chart · Preoperative orders written

## 2021-06-01 NOTE — PROGRESS NOTES
[facility]  General Surgery    Subjective:      HPI: Patient is a very pleasant 59-year-old female with past medical history remarkable for hypertension, hypercholesterolemia, obstructive sleep apnea, impaired glucose fasting impaired fasting glucose and newly diagnosed triple negative left breast cancer 3:30 position 7 cm from the nipple 0.7 cm in size. Patient Active Problem List    Diagnosis Date Noted    IFG (impaired fasting glucose) 04/22/2021    Hyperlipidemia 04/22/2021    Abnormality of left breast on screening mammogram 04/22/2021    Gastroenteritis 12/27/2012    Dehydration 12/27/2012     Past Medical History:   Diagnosis Date    High cholesterol     Hypertension     Sleep apnea       Past Surgical History:   Procedure Laterality Date    HX BLADDER SUSPENSION      HX GYN      hysterectomy    HX OOPHORECTOMY Left     [place] BREAST SURGERY PROCEDURE UNLISTED  2000    right lumpectomy      Family History   Problem Relation Age of Onset    Dementia Mother     Hypertension Mother     High Cholesterol Mother     Other Mother         pre-dm    No Known Problems Son     No Known Problems Son     Other Father         homicide    Other Sister         AIDS    No Known Problems Brother     COPD Sister     No Known Problems Sister       Social History     Tobacco Use    Smoking status: Never Smoker    Smokeless tobacco: Never Used   Substance Use Topics    Alcohol use: Yes     Alcohol/week: 1.7 standard drinks     Types: 2 Glasses of wine per week      No Known Allergies    Prior to Admission medications    Medication Sig Start Date End Date Taking? Authorizing Provider   telmisartan (MICARDIS) 20 mg tablet Take 1 Tab by mouth daily.  5/12/21  Yes Wojciech Verdugo MD   tretinoin (RETIN-A) 0.05 % topical cream APPLY PEA SIZED AMOUNT TOPICALLY TO FACE 3 TO 5 NIGHTS EVERY WEEK AS TOLERATED 3/24/21  Yes Provider, Historical   prednisoLONE acetate (PRED FORTE) 1 % ophthalmic suspension  4/15/21  Yes Provider, Historical   clobetasoL (TEMOVATE) 0.05 % ointment  12/4/20  Yes Provider, Historical   fenofibrate nanocrystallized (TRICOR) 145 mg tablet Take 1 Tab by mouth daily. 12/2/20  Yes GravesWojciech MD   coenzyme q10 (CO Q-10) 10 mg cap Take  by mouth. Yes Provider, Historical   multivitamin (ONE A DAY) tablet Take 1 Tab by mouth daily. Yes Provider, Historical   aspirin 81 mg tablet Take 81 mg by mouth. Yes Other, MD Isaiah   ascorbic acid, vitamin C, (VITAMIN C) 250 mg tablet Take  by mouth. Provider, Historical       Review of Systems:    14 systems were reviewed. The results are as above in the HPI and otherwise negative. Objective:     Vitals:    06/01/21 1316 06/01/21 1323   BP: (!) 157/83 (!) 173/99   Pulse: 95 99   Resp: 18    Temp: 98.2 °F (36.8 °C)    SpO2: 98%    Weight: 58.5 kg (129 lb)    Height: 5' 4\" (1.626 m)        Physical Exam:  GENERAL: alert, cooperative, no distress, appears stated age,   EYE: conjunctivae/corneas clear. PERRL, EOM's intact. THROAT & NECK: normal and no erythema or exudates noted. ,    LYMPHATIC: Cervical, supraclavicular, and axillary nodes normal. ,   LUNG: clear to auscultation bilaterally,   HEART: regular rate and rhythm, S1, S2 normal, no murmur, click, rub or gallop,   BREASTS:   Left: No dimpling, discoloration, nipple inversion or retractions. No axillary or supraclavicular lymphadenopathy. No no mass  Right: No dimpling, discoloration, nipple inversion or retractions. No axillary or supraclavicular lymphadenopathy. No mass  ABDOMEN: soft, non-tender. Bowel sounds normal. No masses,  no organomegaly,   EXTREMITIES:  extremities normal, atraumatic, no cyanosis or edema,   SKIN: Normal.,   NEUROLOGIC: AOx3. Cranial nerves 2-12 and sensation grossly intact. ,     Data Review:  to be done    Impression:     · Patient with triple negative left breast cancer stage I.    Plan:     · Tag localized lumpectomy left breast with left axillary sentinel lymph node biopsy and ultrasound-guided right internal jugular vein Mediport placement  · Consent on chart  · Preoperative orders written

## 2021-06-02 RX ORDER — PILOCARPINE HYDROCHLORIDE 10 MG/ML
1 SOLUTION/ DROPS OPHTHALMIC
COMMUNITY

## 2021-06-02 NOTE — PERIOP NOTES
PRE-SURGICAL INSTRUCTIONS        Patient's Name: Kirsty Henderson     Today's Date:  6/2/2021             Surgery and Surgery Date:  TAG LOCALIZED LUMPECTOMY LEFT BREAST WITH LEFT AXILLARY SENTINEL LYMPH NODE BIOPSY (TAG 6.7.21 [facility] 1300; [facility] 6.9.21 [facility] 1030) - Left on 6/10/2021              1. Do NOT eat or drink anything, including candy, gum, or ice chips after midnight on 6/9/2021, unless you have specific instructions from your surgeon or anesthesia provider to do so. 2. Brush your teeth before coming to the hospital.  3. No smoking 24 hours prior to the day of surgery. 4. No alcohol 24 hours prior to the day of surgery. 5. No recreational drugs for one week prior to the day of surgery. 6. Leave all valuables, including money/purse, at home. 7. Remove all jewelry, nail polish, acrylic nails, and makeup (including mascara); no lotions powders, deodorant, or perfume/cologne/after shave on the skin. 8. Glasses/contact lenses and dentures may be worn to the hospital.  They will be removed prior to surgery. 9. Call your doctor if symptoms of a cold or illness develop within 24-48 hours prior to your surgery. 10.  AN ADULT MUST DRIVE YOU HOME AFTER OUTPATIENT SURGERY. 11.  If you are having an outpatient procedure, please make arrangements for a responsible adult to be with you for 24 hours after your surgery. 12.  NO VISITORS in the hospital at this time for outpatient procedures. Exceptions may be made for surgical admissions, per nursing unit guidelines      Special Instructions:      Bring list of CURRENT medications. Bring any pertinent legal medical records. Take these medications the morning of surgery with a sip of water: none  Follow physician instructions about stopping aspirin. On the day of surgery, come in the main entrance of [facility]. Let the  at the desk know you are there for surgery.   A staff member will come escort you to the surgical area on the second floor.    If you have any questions or concerns, please do not hesitate to call:     (Prior to the day of surgery) PAT department:  [phone]   (Day of surgery) Pre-Op department:  [phone]    These surgical instructions were reviewed with patient during the PAT phone call.

## 2021-06-04 ENCOUNTER — HOSPITAL ENCOUNTER (OUTPATIENT)
Dept: GENERAL RADIOLOGY | Age: 66
Discharge: HOME | End: 2021-06-04
Payer: MEDICARE

## 2021-06-04 ENCOUNTER — HOSPITAL ENCOUNTER (OUTPATIENT)
Dept: PREADMISSION TESTING | Age: 66
Discharge: HOME | End: 2021-06-04
Payer: MEDICARE

## 2021-06-04 DIAGNOSIS — C50.912 BREAST CANCER, STAGE 1, ESTROGEN RECEPTOR NEGATIVE, LEFT (HCC): ICD-10-CM

## 2021-06-04 DIAGNOSIS — Z17.1 BREAST CANCER, STAGE 1, ESTROGEN RECEPTOR NEGATIVE, LEFT (HCC): ICD-10-CM

## 2021-06-04 DIAGNOSIS — Z01.818 PRE-OP TESTING: ICD-10-CM

## 2021-06-04 LAB
ANION GAP SERPL CALC-SCNC: 6 MMOL/L (ref 3–18)
ATRIAL RATE: 80 BPM
BASOPHILS # BLD: 0.1 K/UL (ref 0–0.1)
BASOPHILS NFR BLD: 1 % (ref 0–2)
BUN SERPL-MCNC: 18 MG/DL (ref 7–18)
BUN/CREAT SERPL: 19 (ref 12–20)
CALCIUM SERPL-MCNC: 10 MG/DL (ref 8.5–10.1)
CALCULATED P AXIS, ECG09: 43 DEGREES
CALCULATED R AXIS, ECG10: 47 DEGREES
CALCULATED T AXIS, ECG11: 45 DEGREES
CHLORIDE SERPL-SCNC: 106 MMOL/L (ref 100–111)
CO2 SERPL-SCNC: 28 MMOL/L (ref 21–32)
CREAT SERPL-MCNC: 0.97 MG/DL (ref 0.6–1.3)
DIAGNOSIS, 93000: NORMAL
DIFFERENTIAL METHOD BLD: ABNORMAL
EOSINOPHIL # BLD: 0.1 K/UL (ref 0–0.4)
EOSINOPHIL NFR BLD: 2 % (ref 0–5)
ERYTHROCYTE [DISTWIDTH] IN BLOOD BY AUTOMATED COUNT: 13 % (ref 11.6–14.5)
GLUCOSE SERPL-MCNC: 95 MG/DL (ref 74–99)
HCT VFR BLD AUTO: 40.4 % (ref 35–45)
HGB BLD-MCNC: 13.3 G/DL (ref 12–16)
LYMPHOCYTES # BLD: 1.9 K/UL (ref 0.9–3.6)
LYMPHOCYTES NFR BLD: 33 % (ref 21–52)
MCH RBC QN AUTO: 30.9 PG (ref 24–34)
MCHC RBC AUTO-ENTMCNC: 32.9 G/DL (ref 31–37)
MCV RBC AUTO: 93.7 FL (ref 74–97)
MONOCYTES # BLD: 0.6 K/UL (ref 0.05–1.2)
MONOCYTES NFR BLD: 11 % (ref 3–10)
NEUTS SEG # BLD: 3 K/UL (ref 1.8–8)
NEUTS SEG NFR BLD: 53 % (ref 40–73)
P-R INTERVAL, ECG05: 150 MS
PLATELET # BLD AUTO: 354 K/UL (ref 135–420)
PMV BLD AUTO: 9.3 FL (ref 9.2–11.8)
POTASSIUM SERPL-SCNC: 4.1 MMOL/L (ref 3.5–5.5)
Q-T INTERVAL, ECG07: 362 MS
QRS DURATION, ECG06: 70 MS
QTC CALCULATION (BEZET), ECG08: 417 MS
RBC # BLD AUTO: 4.31 M/UL (ref 4.2–5.3)
SODIUM SERPL-SCNC: 140 MMOL/L (ref 136–145)
VENTRICULAR RATE, ECG03: 80 BPM
WBC # BLD AUTO: 5.7 K/UL (ref 4.6–13.2)

## 2021-06-04 PROCEDURE — 85025 COMPLETE CBC W/AUTO DIFF WBC: CPT

## 2021-06-04 PROCEDURE — 93005 ELECTROCARDIOGRAM TRACING: CPT

## 2021-06-04 PROCEDURE — 80048 BASIC METABOLIC PNL TOTAL CA: CPT

## 2021-06-04 PROCEDURE — 36415 COLL VENOUS BLD VENIPUNCTURE: CPT

## 2021-06-04 PROCEDURE — U0003 INFECTIOUS AGENT DETECTION BY NUCLEIC ACID (DNA OR RNA); SEVERE ACUTE RESPIRATORY SYNDROME CORONAVIRUS 2 (SARS-COV-2) (CORONAVIRUS DISEASE [COVID-19]), AMPLIFIED PROBE TECHNIQUE, MAKING USE OF HIGH THROUGHPUT TECHNOLOGIES AS DESCRIBED BY CMS-2020-01-R: HCPCS

## 2021-06-05 LAB — SARS-COV-2, COV2NT: NOT DETECTED

## 2021-06-07 ENCOUNTER — HOSPITAL ENCOUNTER (OUTPATIENT)
Dept: ULTRASOUND IMAGING | Age: 66
Discharge: HOME | End: 2021-06-07
Attending: SURGERY
Payer: MEDICARE

## 2021-06-07 ENCOUNTER — HOSPITAL ENCOUNTER (OUTPATIENT)
Dept: MAMMOGRAPHY | Age: 66
Discharge: HOME | End: 2021-06-07
Attending: FAMILY MEDICINE
Payer: MEDICARE

## 2021-06-07 DIAGNOSIS — Z98.890 STATUS POST LEFT BREAST BIOPSY: ICD-10-CM

## 2021-06-07 DIAGNOSIS — N63.0 LUMP OR MASS IN BREAST: ICD-10-CM

## 2021-06-07 DIAGNOSIS — C50.919 INVASIVE CARCINOMA OF BREAST (HCC): ICD-10-CM

## 2021-06-07 DIAGNOSIS — R92.8 ABNORMAL MAMMOGRAM: ICD-10-CM

## 2021-06-07 PROCEDURE — 77065 DX MAMMO INCL CAD UNI: CPT

## 2021-06-07 PROCEDURE — 74011000250 HC RX REV CODE- 250: Performed by: SURGERY

## 2021-06-07 PROCEDURE — 19285 PERQ DEV BREAST 1ST US IMAG: CPT

## 2021-06-07 RX ORDER — LIDOCAINE HYDROCHLORIDE 10 MG/ML
5 INJECTION, SOLUTION EPIDURAL; INFILTRATION; INTRACAUDAL; PERINEURAL
Status: COMPLETED | OUTPATIENT
Start: 2021-06-07 | End: 2021-06-07

## 2021-06-07 RX ORDER — LIDOCAINE HYDROCHLORIDE AND EPINEPHRINE 10; 10 MG/ML; UG/ML
1.5 INJECTION, SOLUTION INFILTRATION; PERINEURAL
Status: COMPLETED | OUTPATIENT
Start: 2021-06-07 | End: 2021-06-07

## 2021-06-07 RX ADMIN — LIDOCAINE HYDROCHLORIDE 5 ML: 10 INJECTION, SOLUTION EPIDURAL; INFILTRATION; INTRACAUDAL; PERINEURAL at 14:32

## 2021-06-07 RX ADMIN — LIDOCAINE HYDROCHLORIDE AND EPINEPHRINE 15 MG: 10; 10 INJECTION, SOLUTION INFILTRATION; PERINEURAL at 14:33

## 2021-06-08 DIAGNOSIS — Z85.3 HISTORY OF LEFT BREAST CANCER: Primary | ICD-10-CM

## 2021-06-09 ENCOUNTER — HOSPITAL ENCOUNTER (OUTPATIENT)
Dept: NUCLEAR MEDICINE | Age: 66
Discharge: HOME | End: 2021-06-09
Attending: SURGERY
Payer: MEDICARE

## 2021-06-09 ENCOUNTER — ANESTHESIA EVENT (OUTPATIENT)
Dept: SURGERY | Age: 66
End: 2021-06-09
Payer: MEDICARE

## 2021-06-09 DIAGNOSIS — Z85.3 HISTORY OF LEFT BREAST CANCER: ICD-10-CM

## 2021-06-09 PROCEDURE — A9541 TC99M SULFUR COLLOID: HCPCS

## 2021-06-09 PROCEDURE — 74011000250 HC RX REV CODE- 250

## 2021-06-09 RX ORDER — LIDOCAINE HYDROCHLORIDE 10 MG/ML
INJECTION, SOLUTION EPIDURAL; INFILTRATION; INTRACAUDAL; PERINEURAL
Status: COMPLETED
Start: 2021-06-09 | End: 2021-06-09

## 2021-06-09 RX ORDER — SODIUM BICARBONATE 84 MG/ML
INJECTION, SOLUTION INTRAVENOUS
Status: COMPLETED
Start: 2021-06-09 | End: 2021-06-09

## 2021-06-09 RX ADMIN — LIDOCAINE HYDROCHLORIDE 3 ML: 10 INJECTION, SOLUTION EPIDURAL; INFILTRATION; INTRACAUDAL; PERINEURAL at 11:15

## 2021-06-09 RX ADMIN — SODIUM BICARBONATE 3 MEQ: 84 INJECTION, SOLUTION INTRAVENOUS at 12:06

## 2021-06-10 ENCOUNTER — APPOINTMENT (OUTPATIENT)
Dept: GENERAL RADIOLOGY | Age: 66
End: 2021-06-10
Attending: SURGERY
Payer: MEDICARE

## 2021-06-10 ENCOUNTER — ANESTHESIA (OUTPATIENT)
Dept: SURGERY | Age: 66
End: 2021-06-10
Payer: MEDICARE

## 2021-06-10 ENCOUNTER — HOSPITAL ENCOUNTER (OUTPATIENT)
Age: 66
Discharge: HOME | End: 2021-06-10
Attending: SURGERY | Admitting: SURGERY
Payer: MEDICARE

## 2021-06-10 ENCOUNTER — APPOINTMENT (OUTPATIENT)
Dept: MAMMOGRAPHY | Age: 66
End: 2021-06-10
Attending: SURGERY
Payer: MEDICARE

## 2021-06-10 VITALS
WEIGHT: 125 LBS | BODY MASS INDEX: 22.15 KG/M2 | TEMPERATURE: 97.4 F | DIASTOLIC BLOOD PRESSURE: 73 MMHG | OXYGEN SATURATION: 95 % | SYSTOLIC BLOOD PRESSURE: 115 MMHG | RESPIRATION RATE: 13 BRPM | HEART RATE: 74 BPM | HEIGHT: 63 IN

## 2021-06-10 DIAGNOSIS — G89.18 POSTOPERATIVE PAIN: Primary | ICD-10-CM

## 2021-06-10 DIAGNOSIS — N63.0 LUMP OR MASS IN BREAST: ICD-10-CM

## 2021-06-10 DIAGNOSIS — C50.919 INVASIVE CARCINOMA OF BREAST (HCC): ICD-10-CM

## 2021-06-10 DIAGNOSIS — R92.8 ABNORMAL MAMMOGRAM: ICD-10-CM

## 2021-06-10 PROBLEM — C50.912 BREAST CANCER, STAGE 1, ESTROGEN RECEPTOR NEGATIVE, LEFT (HCC): Status: ACTIVE | Noted: 2021-06-10

## 2021-06-10 PROBLEM — Z17.1 BREAST CANCER, STAGE 1, ESTROGEN RECEPTOR NEGATIVE, LEFT (HCC): Status: ACTIVE | Noted: 2021-06-10

## 2021-06-10 PROCEDURE — 74011000250 HC RX REV CODE- 250: Performed by: SURGERY

## 2021-06-10 PROCEDURE — 77030010509 HC AIRWY LMA MSK TELE -A: Performed by: ANESTHESIOLOGY

## 2021-06-10 PROCEDURE — 2709999900 HC NON-CHARGEABLE SUPPLY: Performed by: SURGERY

## 2021-06-10 PROCEDURE — 00400 ANES INTEGUMENTARY SYS NOS: CPT | Performed by: ANESTHESIOLOGY

## 2021-06-10 PROCEDURE — C1769 GUIDE WIRE: HCPCS | Performed by: SURGERY

## 2021-06-10 PROCEDURE — 76060000035 HC ANESTHESIA 2 TO 2.5 HR: Performed by: SURGERY

## 2021-06-10 PROCEDURE — 77030003028 HC SUT VCRL J&J -A: Performed by: SURGERY

## 2021-06-10 PROCEDURE — 74011250636 HC RX REV CODE- 250/636: Performed by: NURSE ANESTHETIST, CERTIFIED REGISTERED

## 2021-06-10 PROCEDURE — 19301 PARTIAL MASTECTOMY: CPT | Performed by: SURGERY

## 2021-06-10 PROCEDURE — 77030040922 HC BLNKT HYPOTHRM STRY -A: Performed by: SURGERY

## 2021-06-10 PROCEDURE — 77030010512 HC APPL CLP LIG J&J -C: Performed by: SURGERY

## 2021-06-10 PROCEDURE — 71045 X-RAY EXAM CHEST 1 VIEW: CPT

## 2021-06-10 PROCEDURE — 88341 IMHCHEM/IMCYTCHM EA ADD ANTB: CPT

## 2021-06-10 PROCEDURE — 77030040201 HC PRB SET LOCALIZER DISP BIPT -D: Performed by: SURGERY

## 2021-06-10 PROCEDURE — 77030040361 HC SLV COMPR DVT MDII -B: Performed by: SURGERY

## 2021-06-10 PROCEDURE — 77030002996 HC SUT SLK J&J -A: Performed by: SURGERY

## 2021-06-10 PROCEDURE — C1788 PORT, INDWELLING, IMP: HCPCS | Performed by: SURGERY

## 2021-06-10 PROCEDURE — 76210000020 HC REC RM PH II FIRST 0.5 HR: Performed by: SURGERY

## 2021-06-10 PROCEDURE — 88307 TISSUE EXAM BY PATHOLOGIST: CPT

## 2021-06-10 PROCEDURE — 88342 IMHCHEM/IMCYTCHM 1ST ANTB: CPT

## 2021-06-10 PROCEDURE — 88305 TISSUE EXAM BY PATHOLOGIST: CPT

## 2021-06-10 PROCEDURE — 77030031139 HC SUT VCRL2 J&J -A: Performed by: SURGERY

## 2021-06-10 PROCEDURE — 74011250637 HC RX REV CODE- 250/637: Performed by: NURSE ANESTHETIST, CERTIFIED REGISTERED

## 2021-06-10 PROCEDURE — 77030002933 HC SUT MCRYL J&J -A: Performed by: SURGERY

## 2021-06-10 PROCEDURE — 38525 BIOPSY/REMOVAL LYMPH NODES: CPT | Performed by: SURGERY

## 2021-06-10 PROCEDURE — 76210000016 HC OR PH I REC 1 TO 1.5 HR: Performed by: SURGERY

## 2021-06-10 PROCEDURE — 77001 FLUOROGUIDE FOR VEIN DEVICE: CPT | Performed by: SURGERY

## 2021-06-10 PROCEDURE — 77030019605: Performed by: SURGERY

## 2021-06-10 PROCEDURE — 74011000250 HC RX REV CODE- 250: Performed by: NURSE ANESTHETIST, CERTIFIED REGISTERED

## 2021-06-10 PROCEDURE — 38900 IO MAP OF SENT LYMPH NODE: CPT | Performed by: SURGERY

## 2021-06-10 PROCEDURE — 76010000131 HC OR TIME 2 TO 2.5 HR: Performed by: SURGERY

## 2021-06-10 PROCEDURE — 36561 INSERT TUNNELED CV CATH: CPT | Performed by: SURGERY

## 2021-06-10 PROCEDURE — 77030031753 HC SHR ENDO COAG HARM J&J -E: Performed by: SURGERY

## 2021-06-10 PROCEDURE — 00400 ANES INTEGUMENTARY SYS NOS: CPT | Performed by: NURSE ANESTHETIST, CERTIFIED REGISTERED

## 2021-06-10 DEVICE — POWERPORT DUO M.R.I. IMPLANTABLE PORT WITH ATTACHABLE 9.5F CHRONOFLEX OPEN-ENDED DUAL-LUMEN VENOUS CATHETER. INTERMEDIATE KIT (WITH SUTURE PLUGS)
Type: IMPLANTABLE DEVICE | Site: NECK | Status: FUNCTIONAL
Brand: POWERPORT M.R.I., CHRONOFLEX

## 2021-06-10 RX ORDER — HYDROMORPHONE HYDROCHLORIDE 2 MG/ML
0.2 INJECTION, SOLUTION INTRAMUSCULAR; INTRAVENOUS; SUBCUTANEOUS AS NEEDED
Status: DISCONTINUED | OUTPATIENT
Start: 2021-06-10 | End: 2021-06-10 | Stop reason: HOSPADM

## 2021-06-10 RX ORDER — SODIUM CHLORIDE 0.9 % (FLUSH) 0.9 %
5-40 SYRINGE (ML) INJECTION AS NEEDED
Status: DISCONTINUED | OUTPATIENT
Start: 2021-06-10 | End: 2021-06-10 | Stop reason: HOSPADM

## 2021-06-10 RX ORDER — PROPOFOL 10 MG/ML
INJECTION, EMULSION INTRAVENOUS AS NEEDED
Status: DISCONTINUED | OUTPATIENT
Start: 2021-06-10 | End: 2021-06-10 | Stop reason: HOSPADM

## 2021-06-10 RX ORDER — FAMOTIDINE 20 MG/1
20 TABLET, FILM COATED ORAL ONCE
Status: COMPLETED | OUTPATIENT
Start: 2021-06-10 | End: 2021-06-10

## 2021-06-10 RX ORDER — BUPIVACAINE HYDROCHLORIDE AND EPINEPHRINE 2.5; 5 MG/ML; UG/ML
INJECTION, SOLUTION EPIDURAL; INFILTRATION; INTRACAUDAL; PERINEURAL AS NEEDED
Status: DISCONTINUED | OUTPATIENT
Start: 2021-06-10 | End: 2021-06-10 | Stop reason: HOSPADM

## 2021-06-10 RX ORDER — KETOROLAC TROMETHAMINE 15 MG/ML
INJECTION, SOLUTION INTRAMUSCULAR; INTRAVENOUS AS NEEDED
Status: DISCONTINUED | OUTPATIENT
Start: 2021-06-10 | End: 2021-06-10 | Stop reason: HOSPADM

## 2021-06-10 RX ORDER — SODIUM CHLORIDE 0.9 % (FLUSH) 0.9 %
5-40 SYRINGE (ML) INJECTION EVERY 8 HOURS
Status: DISCONTINUED | OUTPATIENT
Start: 2021-06-10 | End: 2021-06-10 | Stop reason: HOSPADM

## 2021-06-10 RX ORDER — DOCUSATE SODIUM 100 MG/1
100 CAPSULE, LIQUID FILLED ORAL 2 TIMES DAILY
Qty: 60 CAPSULE | Refills: 2 | Status: SHIPPED | OUTPATIENT
Start: 2021-06-10 | End: 2021-07-13

## 2021-06-10 RX ORDER — OXYCODONE HYDROCHLORIDE 5 MG/1
5 TABLET ORAL
Qty: 20 TABLET | Refills: 0 | Status: SHIPPED | OUTPATIENT
Start: 2021-06-10 | End: 2021-06-13

## 2021-06-10 RX ORDER — SODIUM CHLORIDE, SODIUM LACTATE, POTASSIUM CHLORIDE, CALCIUM CHLORIDE 600; 310; 30; 20 MG/100ML; MG/100ML; MG/100ML; MG/100ML
75 INJECTION, SOLUTION INTRAVENOUS CONTINUOUS
Status: DISCONTINUED | OUTPATIENT
Start: 2021-06-10 | End: 2021-06-10 | Stop reason: HOSPADM

## 2021-06-10 RX ORDER — DEXAMETHASONE SODIUM PHOSPHATE 4 MG/ML
INJECTION, SOLUTION INTRA-ARTICULAR; INTRALESIONAL; INTRAMUSCULAR; INTRAVENOUS; SOFT TISSUE AS NEEDED
Status: DISCONTINUED | OUTPATIENT
Start: 2021-06-10 | End: 2021-06-10 | Stop reason: HOSPADM

## 2021-06-10 RX ORDER — LIDOCAINE HYDROCHLORIDE 20 MG/ML
INJECTION, SOLUTION EPIDURAL; INFILTRATION; INTRACAUDAL; PERINEURAL AS NEEDED
Status: DISCONTINUED | OUTPATIENT
Start: 2021-06-10 | End: 2021-06-10 | Stop reason: HOSPADM

## 2021-06-10 RX ORDER — HYDROMORPHONE HYDROCHLORIDE 2 MG/ML
0.5 INJECTION, SOLUTION INTRAMUSCULAR; INTRAVENOUS; SUBCUTANEOUS
Status: DISCONTINUED | OUTPATIENT
Start: 2021-06-10 | End: 2021-06-10 | Stop reason: HOSPADM

## 2021-06-10 RX ORDER — FENTANYL CITRATE 50 UG/ML
INJECTION, SOLUTION INTRAMUSCULAR; INTRAVENOUS AS NEEDED
Status: DISCONTINUED | OUTPATIENT
Start: 2021-06-10 | End: 2021-06-10 | Stop reason: HOSPADM

## 2021-06-10 RX ORDER — ACETAMINOPHEN 325 MG/1
650 TABLET ORAL EVERY 6 HOURS
Qty: 56 TABLET | Refills: 1 | Status: SHIPPED | OUTPATIENT
Start: 2021-06-10 | End: 2021-07-13

## 2021-06-10 RX ORDER — SODIUM CHLORIDE, SODIUM LACTATE, POTASSIUM CHLORIDE, CALCIUM CHLORIDE 600; 310; 30; 20 MG/100ML; MG/100ML; MG/100ML; MG/100ML
100 INJECTION, SOLUTION INTRAVENOUS CONTINUOUS
Status: DISCONTINUED | OUTPATIENT
Start: 2021-06-10 | End: 2021-06-10 | Stop reason: HOSPADM

## 2021-06-10 RX ORDER — PHENYLEPHRINE HCL IN 0.9% NACL 1 MG/10 ML
SYRINGE (ML) INTRAVENOUS AS NEEDED
Status: DISCONTINUED | OUTPATIENT
Start: 2021-06-10 | End: 2021-06-10 | Stop reason: HOSPADM

## 2021-06-10 RX ORDER — MIDAZOLAM HYDROCHLORIDE 1 MG/ML
INJECTION, SOLUTION INTRAMUSCULAR; INTRAVENOUS AS NEEDED
Status: DISCONTINUED | OUTPATIENT
Start: 2021-06-10 | End: 2021-06-10 | Stop reason: HOSPADM

## 2021-06-10 RX ORDER — INSULIN LISPRO 100 [IU]/ML
INJECTION, SOLUTION INTRAVENOUS; SUBCUTANEOUS ONCE
Status: DISCONTINUED | OUTPATIENT
Start: 2021-06-10 | End: 2021-06-10 | Stop reason: HOSPADM

## 2021-06-10 RX ORDER — DIPHENHYDRAMINE HYDROCHLORIDE 50 MG/ML
12.5 INJECTION, SOLUTION INTRAMUSCULAR; INTRAVENOUS
Status: DISCONTINUED | OUTPATIENT
Start: 2021-06-10 | End: 2021-06-10 | Stop reason: HOSPADM

## 2021-06-10 RX ORDER — OXYCODONE AND ACETAMINOPHEN 5; 325 MG/1; MG/1
1 TABLET ORAL AS NEEDED
Status: DISCONTINUED | OUTPATIENT
Start: 2021-06-10 | End: 2021-06-10 | Stop reason: HOSPADM

## 2021-06-10 RX ORDER — LIDOCAINE HYDROCHLORIDE 10 MG/ML
0.1 INJECTION, SOLUTION EPIDURAL; INFILTRATION; INTRACAUDAL; PERINEURAL AS NEEDED
Status: DISCONTINUED | OUTPATIENT
Start: 2021-06-10 | End: 2021-06-10 | Stop reason: HOSPADM

## 2021-06-10 RX ORDER — CEFAZOLIN SODIUM 1 G/3ML
INJECTION, POWDER, FOR SOLUTION INTRAMUSCULAR; INTRAVENOUS AS NEEDED
Status: DISCONTINUED | OUTPATIENT
Start: 2021-06-10 | End: 2021-06-10 | Stop reason: HOSPADM

## 2021-06-10 RX ORDER — ONDANSETRON 2 MG/ML
INJECTION INTRAMUSCULAR; INTRAVENOUS AS NEEDED
Status: DISCONTINUED | OUTPATIENT
Start: 2021-06-10 | End: 2021-06-10 | Stop reason: HOSPADM

## 2021-06-10 RX ORDER — ONDANSETRON 2 MG/ML
4 INJECTION INTRAMUSCULAR; INTRAVENOUS ONCE
Status: COMPLETED | OUTPATIENT
Start: 2021-06-10 | End: 2021-06-10

## 2021-06-10 RX ADMIN — CEFAZOLIN 2 G: 1 INJECTION, POWDER, FOR SOLUTION INTRAMUSCULAR; INTRAVENOUS at 14:31

## 2021-06-10 RX ADMIN — MIDAZOLAM HYDROCHLORIDE 2 MG: 2 INJECTION, SOLUTION INTRAMUSCULAR; INTRAVENOUS at 14:16

## 2021-06-10 RX ADMIN — FENTANYL CITRATE 25 MCG: 50 INJECTION, SOLUTION INTRAMUSCULAR; INTRAVENOUS at 15:25

## 2021-06-10 RX ADMIN — HYDROMORPHONE HYDROCHLORIDE 0.5 MG: 2 INJECTION, SOLUTION INTRAMUSCULAR; INTRAVENOUS; SUBCUTANEOUS at 17:02

## 2021-06-10 RX ADMIN — FENTANYL CITRATE 25 MCG: 50 INJECTION, SOLUTION INTRAMUSCULAR; INTRAVENOUS at 14:30

## 2021-06-10 RX ADMIN — FENTANYL CITRATE 25 MCG: 50 INJECTION, SOLUTION INTRAMUSCULAR; INTRAVENOUS at 14:25

## 2021-06-10 RX ADMIN — HYDROMORPHONE HYDROCHLORIDE 0.5 MG: 2 INJECTION, SOLUTION INTRAMUSCULAR; INTRAVENOUS; SUBCUTANEOUS at 17:12

## 2021-06-10 RX ADMIN — FENTANYL CITRATE 25 MCG: 50 INJECTION, SOLUTION INTRAMUSCULAR; INTRAVENOUS at 15:07

## 2021-06-10 RX ADMIN — Medication 100 MCG: at 15:00

## 2021-06-10 RX ADMIN — KETOROLAC TROMETHAMINE 15 MG: 15 INJECTION, SOLUTION INTRAMUSCULAR; INTRAVENOUS at 16:02

## 2021-06-10 RX ADMIN — SODIUM CHLORIDE, SODIUM LACTATE, POTASSIUM CHLORIDE, AND CALCIUM CHLORIDE 75 ML/HR: 600; 310; 30; 20 INJECTION, SOLUTION INTRAVENOUS at 12:48

## 2021-06-10 RX ADMIN — ONDANSETRON 4 MG: 2 INJECTION INTRAMUSCULAR; INTRAVENOUS at 17:13

## 2021-06-10 RX ADMIN — ONDANSETRON 4 MG: 2 INJECTION INTRAMUSCULAR; INTRAVENOUS at 14:29

## 2021-06-10 RX ADMIN — Medication 100 MCG: at 15:11

## 2021-06-10 RX ADMIN — PROPOFOL 120 MG: 10 INJECTION, EMULSION INTRAVENOUS at 14:21

## 2021-06-10 RX ADMIN — LIDOCAINE HYDROCHLORIDE 60 MG: 20 INJECTION, SOLUTION EPIDURAL; INFILTRATION; INTRACAUDAL; PERINEURAL at 14:21

## 2021-06-10 RX ADMIN — FAMOTIDINE 20 MG: 20 TABLET ORAL at 12:47

## 2021-06-10 RX ADMIN — DEXAMETHASONE SODIUM PHOSPHATE 4 MG: 4 INJECTION, SOLUTION INTRAMUSCULAR; INTRAVENOUS at 14:29

## 2021-06-10 RX ADMIN — Medication 100 MCG: at 14:54

## 2021-06-10 NOTE — DISCHARGE INSTRUCTIONS
DISCHARGE SUMMARY from Nurse    PATIENT INSTRUCTIONS:    After general anesthesia or intravenous sedation, for 24 hours or while taking prescription Narcotics:  · Limit your activities  · Do not drive and operate hazardous machinery  · Do not make important personal or business decisions  · Do  not drink alcoholic beverages  · If you have not urinated within 8 hours after discharge, please contact your surgeon on call. Report the following to your surgeon:  · Excessive pain, swelling, redness or odor of or around the surgical area  · Temperature over 100.5  · Nausea and vomiting lasting longer than 4 hours or if unable to take medications  · Any signs of decreased circulation or nerve impairment to extremity: change in color, persistent  numbness, tingling, coldness or increase pain  · Any questions    What to do at Home:  Recommended activity: {discharge activity:87885}, ***    If you experience any of the following symptoms ***, please follow up with ***. *  Please give a list of your current medications to your Primary Care Provider. *  Please update this list whenever your medications are discontinued, doses are      changed, or new medications (including over-the-counter products) are added. *  Please carry medication information at all times in case of emergency situations. These are general instructions for a healthy lifestyle:    No smoking/ No tobacco products/ Avoid exposure to second hand smoke  Surgeon General's Warning:  Quitting smoking now greatly reduces serious risk to your health.     Obesity, smoking, and sedentary lifestyle greatly increases your risk for illness    A healthy diet, regular physical exercise & weight monitoring are important for maintaining a healthy lifestyle    You may be retaining fluid if you have a history of heart failure or if you experience any of the following symptoms:  Weight gain of 3 pounds or more overnight or 5 pounds in a week, increased swelling in our hands or feet or shortness of breath while lying flat in bed. Please call your doctor as soon as you notice any of these symptoms; do not wait until your next office visit. The discharge information has been reviewed with the {PATIENT PARENT GUARDIAN:81964}. The {PATIENT PARENT GUARDIAN:18862} verbalized understanding. Discharge medications reviewed with the {[employer] meds reviewed MMKQ:60642} and appropriate educational materials and side effects teaching were provided. ___________________________________________________________________________________________________________________________________[facility]  Isa Magdaleno MD, FACS  General Surgery    Pt may remove the dressing and shower in two days. Allow soap and water to run over the incision. No driving or operating heavy machinery while on narcotic pain medications. Please apply an ice pack to the operative site for 30 minutes 3 times daily to help reduce pain and swelling and the need for narcotic pain medication. No strenuous activity or contact sports for two weeks. No lifting greater than 15 lbs for 2 weeks. Call MD for any redness, swelling, bleeding or pus at the incision. Also call for any nausea, vomiting, increased pain or pain uncontrolled by pain medicine. Patient Education        Axillary Lymph Node Dissection: What to Expect at Home  Your Recovery  Right after the surgery you will probably feel weak, and your shoulder area will feel sore and stiff for a few days. It may be hard to move your arm and shoulder in all directions. Your doctor or physical therapist will teach you some arm exercises. You now have a higher chance of swelling in the affected arm. This is called lymphedema. From now on, you will have to be careful when using your arm. You will have a scar under your arm that will fade over time. You may also notice a hollow area in your armpit.  It may also feel like you have a lump in your armpit. You may lose some feeling under your arm, or the arm may have a tingling or burning feeling. The loss of feeling may last only a little while, or it may last the rest of your life. You will probably be able to go back to work or your normal routine in 3 to 6 weeks. This depends on the type of work you do and any further treatment. If cancer was found in the lymph nodes, you will probably need more treatment. An axillary node dissection may be done at the same time as other breast cancer surgeries. If this is the case, your recovery may be different. When you find out that you have cancer, you may feel many emotions and may need some help coping. Seek out family, friends, and counselors for support. You also can do things at home to make yourself feel better while you go through treatment. Call the [employer] (6-816.625.3724) or visit its website at [employer] for more information. This care sheet gives you a general idea about how long it will take for you to recover. But each person recovers at a different pace. Follow the steps below to get better as quickly as possible. How can you care for yourself at home? Activity    · Rest when you feel tired. Getting enough sleep will help you recover.     · Try to walk each day. Start by walking a little more than you did the day before. Bit by bit, increase the amount you walk. Walking boosts blood flow and helps prevent pneumonia and constipation.     · Avoid strenuous activities, such as biking, jogging, weightlifting, or aerobic exercise, until your doctor says it is okay. This includes housework, especially if you have to use your affected arm. You will probably be able to do your normal activities in 3 to 6 weeks.  But for the next 3 to 6 months, be careful when you do tasks that use the same motions over and over, such as vacuuming, weed pulling, and window cleaning.     · For 4 to 6 weeks, avoid lifting anything that weighs more than 10 to 15 pounds or that would make you strain. This may include heavy grocery bags and milk containers, a heavy briefcase or backpack, cat litter or dog food bags, a vacuum , or a child.     · Ask your doctor when you can drive again.     · You will probably be able to go back to work or your normal routine in 3 to 6 weeks. It will also depend on the type of work you do and any further treatment.     · You may be able to take showers (unless you have a drain in your incision) 24 to 48 hours after surgery. Pat the cut (incision) dry. Do not take a bath for the first 2 weeks, or until your doctor tells you it is okay. If you have a drain coming out of your incision, follow your doctor's instructions to empty and care for it.     · Take precautions to prevent infection and swelling in your arm. This is called lymphedema. ? Wear gloves when you garden, handle garbage, wash dishes, and clean house. ? Protect your hands and arms from burns, including sunburns. ? Do not wear tight sleeves, elastic cuffs, bracelets, wristwatches, or rings on the affected arm. ? Do not let anyone take blood pressure, draw blood, or give shots in that arm.  ? Do not carry heavy purses, suitcases, grocery bags, and other heavy items with that arm.  ? Keep the skin of that arm well moisturized. ? Do not cut your cuticles. ? Use an electric shaver if you shave your armpits. ? Protect yourself from insect bites on the arm. ? Wear medical alert jewelry that says you can develop lymphedema. You can buy this at most [employer] and on the Internet. Diet    · You can eat your normal diet. If your stomach is upset, try bland, low-fat foods like plain rice, broiled chicken, toast, and yogurt.     · You may notice that your bowel movements are not regular right after your surgery. This is common. Try to avoid constipation and straining with bowel movements. You may want to take a fiber supplement every day.  If you have not had a bowel movement after a couple of days, ask your doctor about taking a mild laxative. Medicines    · Your doctor will tell you if and when you can restart your medicines. He or she will also give you instructions about taking any new medicines.     · If you take aspirin or some other blood thinner, ask your doctor if and when to start taking it again. Make sure that you understand exactly what your doctor wants you to do.     · Be safe with medicines. Take pain medicines exactly as directed. ? If the doctor gave you a prescription medicine for pain, take it as prescribed. ? If you are not taking a prescription pain medicine, ask your doctor if you can take an over-the-counter medicine.     · If your doctor prescribed antibiotics, take them as directed. Do not stop taking them just because you feel better. You need to take the full course of antibiotics.     · If you think your pain medicine is making you sick to your stomach:  ? Take your medicine after meals (unless your doctor has told you not to). ? Ask your doctor for a different pain medicine. Incision care    · If you have strips of tape on the cut (incision) the doctor made, leave the tape on for a week or until it falls off.     · After 24 to 48 hours, wash the area daily with warm, soapy water and pat it dry. Keep the area clean and dry.     · You may cover the area with a gauze bandage if it weeps or rubs against clothing. Change the bandage every day. Exercise    · You will need to do arm exercises once your doctor tells you it is okay. Do the range-of-motion exercises as instructed by your doctor. Elevation    · Prop up your arm on a pillow anytime you sit or lie down. Try to keep it above the level of your heart. This will help reduce swelling. Other instructions    · You may have a drain in your armpit. Follow your doctor's instructions to empty and care for it. Follow-up care is a key part of your treatment and safety.  Be sure to make and go to all appointments, and call your doctor if you are having problems. It's also a good idea to know your test results and keep a list of the medicines you take. When should you call for help? Call 911 anytime you think you may need emergency care. For example, call if:    · You passed out (lost consciousness).     · You have chest pain, are short of breath, or cough up blood. Call your doctor now or seek immediate medical care if:    · You have pain that does not get better after you take pain medicine.     · You cannot pass stools or gas.     · You are sick to your stomach or cannot drink fluids.     · You have signs of a blood clot in your leg (called a deep vein thrombosis), such as:  ? Pain in your calf, back of the knee, thigh, or groin. ? Redness or swelling in your leg.     · You have loose stitches, or your incision comes open.     · Bright red blood has soaked through the bandage over your incision.     · You have signs of infection, such as:  ? Increased pain, swelling, warmth, or redness. ? Red streaks leading from the incision. ? Pus draining from the incision. ? A fever. Watch closely for changes in your health, and be sure to contact your doctor if:    · You have any problems.     · You have new or worse swelling or pain in your arm. Where can you learn more? Go to http://www.gray.com/  Enter L189 in the search box to learn more about \"Axillary Lymph Node Dissection: What to Expect at Home. \"  Current as of: December 17, 2020               Content Version: 12.8  © 2006-2021 [employer]. Care instructions adapted under license by [employer] (which disclaims liability or warranty for this information). If you have questions about a medical condition or this instruction, always ask your healthcare professional. [place] [address] any warranty or liability for your use of this information.          Patient Education Open Breast Biopsy: What to Expect at Home  Your Recovery  An open breast biopsy is surgery to remove abnormal breast tissue. The breast tissue will be sent to a lab, where a doctor will look at the tissue under a microscope to check for breast cancer. Your doctor may have some answers right away. But it can take up to 1 to 2 weeks to get the final results. Your doctor will discuss the results with you. For a few days after the surgery, you will probably feel tired and have some pain. The skin around the cut (incision) may feel firm, swollen, and tender. The area may be bruised. Tenderness should go away in about a week, and the bruising will fade within two weeks. Firmness and swelling may last 6 to 8 weeks. Your incision may have been closed with strips of tape or stitches. If you have strips of tape on the incision, leave the tape on for a week or until it falls off. If you have stitches, your doctor will remove them in about a week. This care sheet gives you a general idea about how long it will take for you to recover. But each person recovers at a different pace. Follow the steps below to get better as quickly as possible. How can you care for yourself at home? Activity    · Rest when you feel tired. Getting enough sleep will help you recover.     · Try to walk each day. Start by walking a little more than you did the day before. Bit by bit, increase the amount you walk. Walking boosts blood flow and helps prevent pneumonia and constipation.     · For 2 weeks, avoid strenuous activities that put pressure on your chest or that involve vigorous movement of your upper body and arm on the side of the biopsy. Examples of these might include strenuous housework, holding an active child, jogging, or aerobic exercise.     · For 2 weeks, avoid lifting anything that would make you strain.  This may include heavy grocery bags and milk containers, a heavy briefcase or backpack, cat litter or dog food bags, a vacuum , or a child.     · Ask your doctor when you can drive again.     · You will probably need to take 1 or 2 days off from work. This depends on the type of work you do and how you feel. Diet    · You can eat your normal diet. If your stomach is upset, try bland, low-fat foods like plain rice, broiled chicken, toast, and yogurt. Medicines    · Your doctor will tell you if and when you can restart your medicines. He or she will also give you instructions about taking any new medicines.     · If you take aspirin or some other blood thinner, ask your doctor if and when to start taking it again. Make sure that you understand exactly what your doctor wants you to do.     · Be safe with medicines. Take pain medicines exactly as directed. ? If the doctor gave you a prescription medicine for pain, take it as prescribed. ? If you are not taking a prescription pain medicine, ask your doctor if you can take an over-the-counter medicine.     · If you think your pain medicine is making you sick to your stomach:  ? Take your medicine after meals (unless your doctor has told you not to). ? Ask your doctor for a different pain medicine.     · If your doctor prescribed antibiotics, take them as directed. Do not stop taking them just because you feel better. You need to take the full course of antibiotics. Incision care    · If you have strips of tape on the incision, leave the tape on for a week or until it falls off.     · Wash the area daily with warm, soapy water, and pat it dry. Don't use hydrogen peroxide or alcohol, which can slow healing. You may cover the area with a gauze bandage if it weeps or rubs against clothing. Change the bandage every day.     · Keep the area clean and dry. Other instructions    · For the first 3 days after surgery, wear a supportive bra all the time, even at night. Follow-up care is a key part of your treatment and safety.  Be sure to make and go to all appointments, and call your doctor if you are having problems. It's also a good idea to know your test results and keep a list of the medicines you take. When should you call for help? Call 911 anytime you think you may need emergency care. For example, call if:    · You passed out (lost consciousness).     · You have chest pain, are short of breath, or cough up blood. Call your doctor now or seek immediate medical care if:    · You are sick to your stomach or cannot drink fluids.     · You have pain that does not get better after you take pain medicine.     · You cannot pass stools or gas.     · You have signs of a blood clot in your leg (called a deep vein thrombosis), such as:  ? Pain in your calf, back of the knee, thigh, or groin. ? Redness or swelling in your leg.     · You have signs of infection, such as:  ? Increased pain, swelling, warmth, or redness. ? Red streaks leading from the incision. ? Pus draining from the incision. ? A fever.     · You have loose stitches, or your incision comes open.     · Bright red blood has soaked through the bandage over your incision. Watch closely for changes in your health, and be sure to contact your doctor if:    · You have any problems.     · You have new or worse swelling or pain in your arm. Where can you learn more? Go to http://www.gray.com/  Enter C910 in the search box to learn more about \"Open Breast Biopsy: What to Expect at Home. \"  Current as of: December 17, 2020               Content Version: 12.8  © 2679-0564 [employer]. Care instructions adapted under license by [employer] (which disclaims liability or warranty for this information). If you have questions about a medical condition or this instruction, always ask your healthcare professional. [address] any warranty or liability for your use of this information.

## 2021-06-10 NOTE — DISCHARGE SUMMARY
4 Ashley Hogan MD, [address]  General Surgery  Discharge Summary     Patient ID:  Sid Yeager  [id number]  76 y.o.  1955    Admit Date: 6/10/2021    Discharge Date: 6/10/2021    Admission Diagnoses: Breast cancer, stage 1, estrogen receptor negative, left ([facility] [C50.912, Z17.1]    Discharge Diagnoses:    Problem List as of 6/10/2021 Date Reviewed: 6/1/2021        Codes Class Noted - Resolved    Breast cancer, stage 1, estrogen receptor negative, left ([facility] ICD-10-CM: C50.912, Z17.1  ICD-9-CM: 174.9, V86.1  6/10/2021 - Present        IFG (impaired fasting glucose) ICD-10-CM: R73.01  ICD-9-CM: 790.21  4/22/2021 - Present        Hyperlipidemia ICD-10-CM: E78.5  ICD-9-CM: 272.4  4/22/2021 - Present        Abnormality of left breast on screening mammogram ICD-10-CM: R92.8  ICD-9-CM: 793.80  4/22/2021 - Present        Gastroenteritis ICD-10-CM: K52.9  ICD-9-CM: 558.9  12/27/2012 - Present        Dehydration ICD-10-CM: E86.0  ICD-9-CM: 276.51  12/27/2012 - Present               Admission Condition: Good    Discharge Condition: Good    Last Procedure: Procedure(s):  TAG LOCALIZED LUMPECTOMY LEFT BREAST WITH LEFT AXILLARY SENTINEL LYMPH NODE BIOPSY (TAG 6.7.21 [facility] 1300; SL[facility] 6.9.21 [facility]30)  ULTRASOUND GUIDED RIGHT INTERNAL JUGULAR VEIN MEDIPORT PLACEMENT/C-ARM    Hospital Course:   Normal hospital course for this procedure. Consults: None    Significant Diagnostic Studies: None    Disposition: home    Patient Instructions:   Current Discharge Medication List      START taking these medications    Details   oxyCODONE IR (ROXICODONE) 5 mg immediate release tablet Take 1 Tablet by mouth every four (4) hours as needed for Pain for up to 3 days. Max Daily Amount: 30 mg.  Qty: 20 Tablet, Refills: 0    Associated Diagnoses: Postoperative pain      acetaminophen (TYLENOL) 325 mg tablet Take 2 Tablets by mouth every six (6) hours.  Indications: pain  Qty: 56 Tablet, Refills: 1 docusate sodium (COLACE) 100 mg capsule Take 1 Capsule by mouth two (2) times a day for 90 days. Qty: 60 Capsule, Refills: 2      bisacodyL 5 mg tab Take 5 mg by mouth daily. Qty: 3 Tablet, Refills: 0         CONTINUE these medications which have NOT CHANGED    Details   pilocarpine (PILOCAR) 1 % ophthalmic solution Administer 1 Drop to both eyes nightly. telmisartan (MICARDIS) 20 mg tablet Take 1 Tab by mouth daily. Qty: 90 Tab, Refills: 4    Associated Diagnoses: Essential hypertension      fenofibrate nanocrystallized (TRICOR) 145 mg tablet Take 1 Tab by mouth daily. Qty: 90 Tab, Refills: 1    Associated Diagnoses: Hyperlipidemia, unspecified hyperlipidemia type      coenzyme q10 (CO Q-10) 10 mg cap Take  by mouth.      multivitamin (ONE A DAY) tablet Take 1 Tab by mouth daily. aspirin 81 mg tablet Take 81 mg by mouth. Activity: See surgical instructions  Diet: Low fat, Low cholesterol  Wound Care: As directed    Follow-up with Dr. Jhonny Avery in 1 week.   Follow-up tests/labs None    Signed:  Mamie Chavez MD  6/10/2021  2:19 PM

## 2021-06-10 NOTE — INTERVAL H&P NOTE
Update History & Physical    The Patient's History and Physical of June 1, 2021 was reviewed with the patient and I examined the patient. There was no change. The surgical site was confirmed by the patient and me. Plan:  The risk, benefits, expected outcome, and alternative to the recommended procedure have been discussed with the patient. Patient understands and wants to proceed with the procedure.     Electronically signed by Karla Max MD on 6/10/2021 at 2:03 PM

## 2021-06-10 NOTE — OP NOTES
[address] [place] [address] Dr                               [address] [facility], [address]  Deepak Ramon                                 OPERATIVE REPORT    PATIENT:    Haven Owen  MRN:           [id number]   DATE:   6/10/2021  BILLIN  ROOM:       /PL  ATTENDING:   Karla Max MD  DICTATING:   Karla Max MD      PREOPERATIVE DIAGNOSIS: Triple negative left breast cancer     POSTOPERATIVE DIAGNOSIS: Same    PROCEDURES PERFORMED: Tag localized lumpectomy left breast with left sentinel lymph node biopsy with ultrasound-guided right internal jugular Mediport placement    SURGEON: Tyrese Goff MD    ASSISTANT: Osvaldo Panchal SA    ANESTHESIA: LMA general and local (0.25% Marcaine with epinephrine). SPECIMENS REMOVED: Left breast lumpectomy tissue and left axillary sentinel nodes. FINDINGS: Specimen mammography is confirmative. ESTIMATED BLOOD LOSS: 25 mL. FLUIDS GIVEN: 600 mL. IMPLANTS: 9.5 [place] dual-lumen Mediport in the right internal jugular vein    COMPLICATIONS: None    DESCRIPTION OF PROCEDURE: The patient was identified in the holding area  with family where consent for tag localized lumpectomy of left  breast with left sentinel lymph node biopsy and right cephalic vein MediPort placement was verified. In the operating room, the patient was positioned supine on the OR table with the right arm tucked. She did receive perioperative antibiotics. LMA general anesthesia was induced. The lymphazurin blue was injected intradermally around the areolar in 4 corners. The patient's bilateral chest, neck and left axilla were prepped and draped in sterile fashion using chlorhexidine solution and sterile drapes. Time-out was performed to insure correct procedure. The ultrasound was used to identify the right internal jugular vein.   The local anesthetic was infiltrated into the skin and deep dermal tissues overlying the internal jugular vein and at the incision site for the mediport on the left chest wall. The venipuncture of the internal jugular vein was performed. Seldinger technique was used to insert the catheter into the vein guided to the right atrial/SVC junction with fluoroscopy. An incision was created on the left chest wall at the site which was previously injected with local anesthetic. The catheter was then tunneled into the incision on the chest wall. A pocket for the port was created using blunt dissection and electrocautery. The catheter was attached to the port. The port flushed and aspirated with ease. The 3-0 Vicryl was used to reapproximate the deep dermal tissues, 4-0 Monocryl was used to close the skin. Mastisol, steristrips, 4x4 guaze and Tegaderm were used to create a sterile dressing. Attention was then turned to left axillary sentinel lymph node biopsy. The local anesthetic was infiltrated into the skin and deep dermal tissues at the base of the hair bearing skin. The 15 blade was used to create an incision through skin into the subcutaneous tissue. The pectoral fascia was opened to enter the axilla. The Neoprobe and the blue dye were used to identify the sentinel node. The Harmonic Focus was used to excise the nodes. The nodes were passed off the table to be sent to pathology. The Localizer was used to identify the tag. The local anesthetic was injected. The #15 blade was used to dissect through the skin and subcutaneous tissue. The electrocautery was used to dissect down approximately 0.5 cm into the breast tissue. The dissection of the   Allis clamp was use to dissect the tissue. The dissection was guided by the Localizer. Circumferential dissection of the  specimen with a margin of normal tissue was performed using electrocautery. The specimen was  marked with the a long stitch lateral, short stitch superior and a double  stitch posterior.     The specimen was examined in the Trident machine in order to identify the tag and clip within the specimen. Both the tag and the clip were identified in the specimen. The specimen images were then transferred made to the radiologist who confirmed the presence of the tag and clip and the lesion. . Additional local anesthetic was infiltrated into the cavity. Electrocautery was used to  control bleeding within the cavities. Both the lumpectomy and axillary incisions were irrigated with warm saline. 3-0 Vicryl suture in interrupted stitches were used to reapproximate the deep dermal tissues. 4-0 Monocryl  suture was used to reapproximate the skin in a running subcuticular  closure. Mastisol steristrips and sterile bandages were applied. The patient tolerated the  procedure very well.

## 2021-06-10 NOTE — ANESTHESIA POSTPROCEDURE EVALUATION
Procedure(s):  TAG LOCALIZED LUMPECTOMY LEFT BREAST WITH LEFT AXILLARY SENTINEL LYMPH NODE BIOPSY (TAG 6.7.21 [facility] 1300; SLNB 6.9.21 [facility] 1030)  ULTRASOUND GUIDED RIGHT INTERNAL JUGULAR VEIN MEDIPORT PLACEMENT/C-ARM. general    Anesthesia Post Evaluation      Multimodal analgesia: multimodal analgesia used between 6 hours prior to anesthesia start to PACU discharge  Patient location during evaluation: bedside  Patient participation: complete - patient participated  Level of consciousness: awake  Pain management: adequate  Airway patency: patent  Anesthetic complications: no  Cardiovascular status: stable  Respiratory status: acceptable  Hydration status: acceptable  Post anesthesia nausea and vomiting:  controlled  Final Post Anesthesia Temperature Assessment:  Normothermia (36.0-37.5 degrees C)      INITIAL Post-op Vital signs:   Vitals Value Taken Time   /57 06/10/21 1618   Temp 36.3 °C (97.4 °F) 06/10/21 1618   Pulse 71 06/10/21 1626   Resp 19 06/10/21 1626   SpO2 99 % 06/10/21 1626   Vitals shown include unvalidated device data.

## 2021-06-10 NOTE — ANESTHESIA PREPROCEDURE EVALUATION
Relevant Problems   No relevant active problems       Anesthetic History   No history of anesthetic complications            Review of Systems / Medical History  Patient summary reviewed, nursing notes reviewed and pertinent labs reviewed    Pulmonary        Sleep apnea           Neuro/Psych   Within defined limits           Cardiovascular    Hypertension          Hyperlipidemia    Exercise tolerance: >4 METS     GI/Hepatic/Renal                Endo/Other        Cancer     Other Findings              Physical Exam    Airway  Mallampati: II  TM Distance: 4 - 6 cm  Neck ROM: normal range of motion        Cardiovascular  Regular rate and rhythm,  S1 and S2 normal,  no murmur, click, rub, or gallop  Rhythm: regular  Rate: normal         Dental  No notable dental hx       Pulmonary  Breath sounds clear to auscultation               Abdominal  GI exam deferred       Other Findings            Anesthetic Plan    ASA: 3  Anesthesia type: general          Induction: Intravenous  Anesthetic plan and risks discussed with: Patient

## 2021-06-16 ENCOUNTER — NURSE NAVIGATOR (OUTPATIENT)
Dept: OTHER | Age: 66
End: 2021-06-16

## 2021-06-16 ENCOUNTER — OFFICE VISIT (OUTPATIENT)
Dept: SURGERY | Age: 66
End: 2021-06-16
Payer: MEDICARE

## 2021-06-16 VITALS
TEMPERATURE: 98.6 F | RESPIRATION RATE: 18 BRPM | HEART RATE: 87 BPM | DIASTOLIC BLOOD PRESSURE: 77 MMHG | WEIGHT: 129 LBS | HEIGHT: 63 IN | BODY MASS INDEX: 22.86 KG/M2 | SYSTOLIC BLOOD PRESSURE: 127 MMHG | OXYGEN SATURATION: 98 %

## 2021-06-16 DIAGNOSIS — C50.919 INVASIVE CARCINOMA OF BREAST (HCC): Primary | ICD-10-CM

## 2021-06-16 PROCEDURE — 99024 POSTOP FOLLOW-UP VISIT: CPT | Performed by: SURGERY

## 2021-06-16 NOTE — NURSE NAVIGATOR
Initial assessment for Dee Polanco, newly diagnosed with triple negative breast cancer. Meeting with pt included pt and  . Pt is postop left lumpectomy with SLN bx and mediport placement. Patient was assessed for the following barriers:    Communication:       Yes    No  -Ability to read/write,understand [place]       [x]      []    -Ability to talk with family/children,friends about diagnosis     [x]      []    -Other:  Comments/Referrals/Services provided: Pt is able to speak to relatives about new breast cancer diagnosis. Employment/Financial/Legal:     Yes    No  -Loss of employment/income         []      [x]    -Insurance coverage          [x]      []     -Needs help applying for Social Security/Disability/FMLA     []      [x]     -Help with Co-Pays for office visits         []      [x]    -Medication assistance/medical supplies or equipment     []      [x]    -Difficulty paying bills: utilities/housing       []      [x]    -Means to buy food                     [x]      []    -Legal issues           []      [x]    -Other:  Comments/Referrals/Services provided: Pt is retired, she has insurance with Medicare/[employer]. No financial concerns at this time  Psychosocial        Yes    No  Housing:  -Homeless           []      [x]    -Extended care needs: long term care/home care/Hospice     []      [x]    -Other:  Comments/Referrals/Services provided: No housing concerns  Transportation:       Yes    No  -Lack of vehicle or public transportation options      []      [x]    -Funds need for public transportation: bus/taxi      []      [x]    -Other:  Comments/Referrals/Services provided: No transportation concerns at this time.   Support system:       Yes No  -Family members at home: spouse/significant other/children    [x]      []    -Has a support system         [x]      []    Other:  Comments/Referrals/Services provided:   Spirituality:        Yes No  -Spiritual issues          []      [x]    -Cultural concerns          []      [x]    -Other:  -Comments/Referrals/Services provided: No concerns today. Sexuality:        Yes No  -Body image concerns                     []      [x]    -Relationships/significant other issues        []      [x]    -Other:  Comments/Referrals/Services provided: No concerns today. Coping:        Yes    No  -Able to manage emotions         [x]      []    -Feeling fearful or anxious         [x]      []    -Interest in attending support groups        [x]      []    -Cancer related pain/control         []      [x]    -Other:  Comments/Referrals/Services provided: Pt is a bit depressed and anxious about cancer diagnosis and chemotherapy, referred to  also referred for counseling at [facility]. Tobacco dependency:      Yes No  -Currently smokes: cigarettes/cigars        []      [x]    -Uses smokeless tobacco         []      [x]    -Other:  Comments/Referrals/Services provided: Denies smoking    Education/review of Disease process and Management   Yes No  -Explanation of navigator role/contact information      [x]      []    New Patient Guide for Breast Cancer provided                      [x]     []         -Pathology/Staging          [x]      []    -Diagnostic tests          [x]      []    -Genetic testing needed         [x]      []    -Treatment options/plan: surgery/chemotherapy/radiation     [x]      []    -Mediport placement as needed                              [x]      []    -Tobacco cessation as needed        []      [x]    -Need for a second opinion         []      [x]    -Importance of bringing family/friends to medical appts     [x]      []   -Other:  Patient/family verbalized understanding of treatment plan: Yes. Dr. Padmini Dwyer discussed breast cancer treatment options with pt and . She is postop left lumpectomy with SLN biopsy and mediport placement.  Pt is has triple negative breast cancer, she has an appointment with med/onc-Dr. Cristo Ann to discuss adjuvant chemo. Pt will also meet with rad/onc- Dr. Yamil Duval, appointment scheduled for 21. Breast guide book given to pt. All questions answered. NCCN Distress Tool    Charlee Escobar was assessed for management of distress using the NCCN Distress Thermometer for Patients tool. Mild to moderate distress  Scorin-4        Yes    No    -Practical/physical issues       []      [x]      -Provide community resources      [x]      []      -Provide list of support groups         [x]      []      -Provide list of national organizations and websites               [x]      []      -Provide ongoing supportive care by oncology medical team        [x]      []                  Comments/Referrals/Services provided:  Yes. Score 6, referred to . Moderate to severe distress  Scorin or greater                   Yes    No    -Navigator consulted with MD      [x]      []     -Navigator follow up         [x]      []     -Spiritual concerns        []      [x]     -Emotional/family concerns       [x]      []     -Refer to /mental health professional/PCP   [x]      []      Comments/Referral/Services provided:  Yes.

## 2021-06-17 ENCOUNTER — DOCUMENTATION ONLY (OUTPATIENT)
Dept: SURGERY | Age: 66
End: 2021-06-17

## 2021-06-24 ENCOUNTER — HOSPITAL ENCOUNTER (OUTPATIENT)
Dept: RADIATION THERAPY | Age: 66
Discharge: HOME | End: 2021-06-24
Payer: MEDICARE

## 2021-06-24 PROCEDURE — 99211 OFF/OP EST MAY X REQ PHY/QHP: CPT

## 2021-06-28 ENCOUNTER — HOSPITAL ENCOUNTER (OUTPATIENT)
Dept: LAB | Age: 66
Discharge: HOME | End: 2021-06-28
Payer: MEDICARE

## 2021-06-28 ENCOUNTER — OFFICE VISIT (OUTPATIENT)
Dept: ONCOLOGY | Age: 66
End: 2021-06-28
Payer: MEDICARE

## 2021-06-28 VITALS
DIASTOLIC BLOOD PRESSURE: 83 MMHG | RESPIRATION RATE: 16 BRPM | TEMPERATURE: 98.1 F | SYSTOLIC BLOOD PRESSURE: 135 MMHG | HEART RATE: 93 BPM | WEIGHT: 129.4 LBS | HEIGHT: 63 IN | BODY MASS INDEX: 22.93 KG/M2 | OXYGEN SATURATION: 98 %

## 2021-06-28 DIAGNOSIS — C50.919 TRIPLE NEGATIVE MALIGNANT NEOPLASM OF BREAST (HCC): ICD-10-CM

## 2021-06-28 DIAGNOSIS — Z17.1 BREAST CANCER, STAGE 1, ESTROGEN RECEPTOR NEGATIVE, LEFT (HCC): Primary | ICD-10-CM

## 2021-06-28 DIAGNOSIS — C50.912 BREAST CANCER, STAGE 1, ESTROGEN RECEPTOR NEGATIVE, LEFT (HCC): Primary | ICD-10-CM

## 2021-06-28 LAB
ALBUMIN SERPL-MCNC: 4.2 G/DL (ref 3.4–5)
ALBUMIN/GLOB SERPL: 1.2 {RATIO} (ref 0.8–1.7)
ALP SERPL-CCNC: 107 U/L (ref 45–117)
ALT SERPL-CCNC: 29 U/L (ref 13–56)
ANION GAP SERPL CALC-SCNC: 6 MMOL/L (ref 3–18)
AST SERPL-CCNC: 21 U/L (ref 10–38)
BASOPHILS # BLD: 0.1 K/UL (ref 0–0.1)
BASOPHILS NFR BLD: 1 % (ref 0–2)
BILIRUB SERPL-MCNC: 0.4 MG/DL (ref 0.2–1)
BUN SERPL-MCNC: 20 MG/DL (ref 7–18)
BUN/CREAT SERPL: 19 (ref 12–20)
CALCIUM SERPL-MCNC: 10.7 MG/DL (ref 8.5–10.1)
CHLORIDE SERPL-SCNC: 108 MMOL/L (ref 100–111)
CO2 SERPL-SCNC: 29 MMOL/L (ref 21–32)
CREAT SERPL-MCNC: 1.04 MG/DL (ref 0.6–1.3)
DIFFERENTIAL METHOD BLD: ABNORMAL
EOSINOPHIL # BLD: 0.1 K/UL (ref 0–0.4)
EOSINOPHIL NFR BLD: 2 % (ref 0–5)
ERYTHROCYTE [DISTWIDTH] IN BLOOD BY AUTOMATED COUNT: 13.2 % (ref 11.6–14.5)
GLOBULIN SER CALC-MCNC: 3.5 G/DL (ref 2–4)
GLUCOSE SERPL-MCNC: 135 MG/DL (ref 74–99)
HCT VFR BLD AUTO: 43.3 % (ref 35–45)
HGB BLD-MCNC: 13 G/DL (ref 12–16)
LYMPHOCYTES # BLD: 1.8 K/UL (ref 0.9–3.6)
LYMPHOCYTES NFR BLD: 35 % (ref 21–52)
MCH RBC QN AUTO: 29.7 PG (ref 24–34)
MCHC RBC AUTO-ENTMCNC: 30 G/DL (ref 31–37)
MCV RBC AUTO: 99.1 FL (ref 74–97)
MONOCYTES # BLD: 0.5 K/UL (ref 0.05–1.2)
MONOCYTES NFR BLD: 10 % (ref 3–10)
NEUTS SEG # BLD: 2.8 K/UL (ref 1.8–8)
NEUTS SEG NFR BLD: 52 % (ref 40–73)
PLATELET # BLD AUTO: 386 K/UL (ref 135–420)
PMV BLD AUTO: 9.5 FL (ref 9.2–11.8)
POTASSIUM SERPL-SCNC: 4.7 MMOL/L (ref 3.5–5.5)
PROT SERPL-MCNC: 7.7 G/DL (ref 6.4–8.2)
RBC # BLD AUTO: 4.37 M/UL (ref 4.2–5.3)
SODIUM SERPL-SCNC: 143 MMOL/L (ref 136–145)
WBC # BLD AUTO: 5.3 K/UL (ref 4.6–13.2)

## 2021-06-28 PROCEDURE — 86300 IMMUNOASSAY TUMOR CA 15-3: CPT

## 2021-06-28 PROCEDURE — 85025 COMPLETE CBC W/AUTO DIFF WBC: CPT

## 2021-06-28 PROCEDURE — 99205 OFFICE O/P NEW HI 60 MIN: CPT | Performed by: INTERNAL MEDICINE

## 2021-06-28 PROCEDURE — G0463 HOSPITAL OUTPT CLINIC VISIT: HCPCS | Performed by: INTERNAL MEDICINE

## 2021-06-28 PROCEDURE — 36415 COLL VENOUS BLD VENIPUNCTURE: CPT

## 2021-06-28 PROCEDURE — 80053 COMPREHEN METABOLIC PANEL: CPT

## 2021-06-28 RX ORDER — LANOLIN ALCOHOL/MO/W.PET/CERES
1 CREAM (GRAM) TOPICAL
COMMUNITY

## 2021-06-28 NOTE — PROGRESS NOTES
Zaid Huang presents today for   Chief Complaint   Patient presents with    New Patient       Is someone accompanying this pt? Yes, Asa Judith spouse    Is the patient using any DME equipment during OV? no    Coordination of Care:  1. Have you been to the ER, urgent care clinic since your last visit? Hospitalized since your last visit? no    2. Have you seen or consulted any other health care providers outside of the [address] [place] [address] since your last visit? Include any pap smears or colon screening.  no

## 2021-06-28 NOTE — PROGRESS NOTES
[facility]  General Surgery    Subjective:  Patient is 1 week out from left breast tag localized lumpectomy with left axillary sentinel lymph node biopsy and right Mediport placement for triple -6 mm focus of cancer on biopsy. The final pathology revealed a 3 mm residual invasive adenocarcinoma with negative lymph nodes. Objective:  Vitals:    06/16/21 1316   BP: 127/77   Pulse: 87   Resp: 18   Temp: 98.6 °F (37 °C)   SpO2: 98%   Weight: 58.5 kg (129 lb)   Height: 5' 3\" (1.6 m)       Physical Exam:    General: Awake and alert, oriented x4, no apparent distress   Breasts:    Left: No dimpling, discoloration, nipple inversion or retractions. No axillary or supraclavicular lymphadenopathy. No mass. Breast and axillary incisions are clean dry and intact. No evidence of hematoma. Right: Mediport palpable. Incision clean dry and intact. Current Medications:  Current Outpatient Medications   Medication Sig Dispense Refill    calcium citrate-vitamin d3 (CITRACAL+D) 315 mg-5 mcg (200 unit) tab Take 1 Tablet by mouth daily (with breakfast).  acetaminophen (TYLENOL) 325 mg tablet Take 2 Tablets by mouth every six (6) hours. Indications: pain 56 Tablet 1    docusate sodium (COLACE) 100 mg capsule Take 1 Capsule by mouth two (2) times a day for 90 days. (Patient not taking: Reported on 6/28/2021) 60 Capsule 2    bisacodyL 5 mg tab Take 5 mg by mouth daily. (Patient not taking: Reported on 6/28/2021) 3 Tablet 0    pilocarpine (PILOCAR) 1 % ophthalmic solution Administer 1 Drop to both eyes nightly.  telmisartan (MICARDIS) 20 mg tablet Take 1 Tab by mouth daily. 90 Tab 4    fenofibrate nanocrystallized (TRICOR) 145 mg tablet Take 1 Tab by mouth daily. 90 Tab 1    coenzyme q10 (CO Q-10) 10 mg cap Take  by mouth.  multivitamin (ONE A DAY) tablet Take 1 Tab by mouth daily.  aspirin 81 mg tablet Take 81 mg by mouth. Chart and notes reviewed. Data reviewed.  I have evaluated and examined the patient. Impression and plan:  Patient with triple negative left breast cancer stage I.   Referral to medical oncology and radiation oncology  Follow-up in 3 months     Randi Harrison MD

## 2021-06-28 NOTE — PROGRESS NOTES
Hematology/Oncology Consultation Note      Date: 2021    Name: Juan Manuel Bowers  : 1955        Melissa Peters MD         Subjective:     Chief complaint: Triple negative left breast cancer    History of Present Illness:   Ms. Holger Wilkins is a most pleasant 76y.o. year old female who was seen for consultation of triple negative left breast cancer. The patient was accompanied by her family during this visit. The patient has a past medical history significant of hypertension, hypercholesterolemia, obstructive sleep apnea. Her Oncology history stated as below:  -- 21 Screening mammogram reported possible posterior lateral left breast mass. -- 21 Diagnostic mammogram reported left breast oval, heterogeneously hypoechoic mass with microlobulated margins at the 3:30 position, 8 cfn, measuring 0.8 x 0.7 x 0.3 cm.  -- 21 Biopsy reported invasive ductal carcinoma, grade 3, ER/RI negative, HER2 negative. -- 21 MRI breast reported left breast mass at 3:30, 0.7 cm malignant mass (correlating to biopsied mass). -- 06/10/21 S.p Tag localized lumpectomy left breast with left sentinel lymph node biopsy with ultrasound-guided right internal jugular Mediport placement. Lumpectomy with SLNBx reported invasive ductal adenocarcinoma, grade 3, 5 mm, 0/2 lymph nodes involved, and negative margins (9 mm posterior). pT1bN0  The patient otherwise has no other complaints. Denied fever, chills, night sweat, unintentional weight loss, skin lumps or bumps, acute bleeding or bruising issues. Denied headache, acute vision change, dizziness, chest pain, worsen shortness of breath, palpitation, productive cough, nausea, vomiting, abdominal pain, altered bowel habits, dysuria, worsen bone pain or back pain, new focal numbness or weakness. GYN History: Postmenopausal,  2, Para 2. Menarche at 14. First birth at 24 and menopause at 63.         Past Medical History, Family History, and Social History:    Past Medical History:   Diagnosis Date    High cholesterol     Hypertension     Sleep apnea     patient denies     Past Surgical History:   Procedure Laterality Date    HX BLADDER SUSPENSION      HX BREAST BIOPSY Left 6/10/2021    TAG LOCALIZED LUMPECTOMY LEFT BREAST WITH LEFT AXILLARY SENTINEL LYMPH NODE BIOPSY (TAG 6.7.21 [facility] 1300; SLNB 6.9.21 [facility] 1030) performed by Alejandra Sheehan MD at [address] HX GYN      hysterectomy    HX HEENT Bilateral 05/13/2021    Bilateral Laser surgery: Anatomical narrow angle, bilateral    HX OOPHORECTOMY Left     OK BREAST SURGERY PROCEDURE UNLISTED  2000    right lumpectomy     Social History     Socioeconomic History    Marital status:      Spouse name: Not on file    Number of children: Not on file    Years of education: Not on file    Highest education level: Not on file   Occupational History    Not on file   Tobacco Use    Smoking status: Never Smoker    Smokeless tobacco: Never Used   Vaping Use    Vaping Use: Never used   Substance and Sexual Activity    Alcohol use: Yes     Comment: rare    Drug use: Never    Sexual activity: Not Currently     Partners: Male     Birth control/protection: None   Other Topics Concern    Not on file   Social History Narrative    Not on file     Social Determinants of Health     Financial Resource Strain:     Difficulty of Paying Living Expenses:    Food Insecurity:     Worried About Running Out of Food in the Last Year:     Ran Out of Food in the Last Year:    Transportation Needs:     Lack of Transportation (Medical):      Lack of Transportation (Non-Medical):    Physical Activity:     Days of Exercise per Week:     Minutes of Exercise per Session:    Stress:     Feeling of Stress :    Social Connections:     Frequency of Communication with Friends and Family:     Frequency of Social Gatherings with Friends and Family:     Attends Islam Services:     Active Member of Clubs or Organizations:     Attends Club or Organization Meetings:     Marital Status:    Intimate Partner Violence:     Fear of Current or Ex-Partner:     Emotionally Abused:     Physically Abused:     Sexually Abused:      Family History   Problem Relation Age of Onset    Dementia Mother     Hypertension Mother     High Cholesterol Mother     Other Mother         pre-dm    No Known Problems Son     No Known Problems Son     Other Father         homicide    Other Sister         AIDS    No Known Problems Brother     COPD Sister     No Known Problems Sister      Current Outpatient Medications   Medication Sig Dispense Refill    calcium citrate-vitamin d3 (CITRACAL+D) 315 mg-5 mcg (200 unit) tab Take 1 Tablet by mouth daily (with breakfast).  acetaminophen (TYLENOL) 325 mg tablet Take 2 Tablets by mouth every six (6) hours. Indications: pain 56 Tablet 1    pilocarpine (PILOCAR) 1 % ophthalmic solution Administer 1 Drop to both eyes nightly.  telmisartan (MICARDIS) 20 mg tablet Take 1 Tab by mouth daily. 90 Tab 4    fenofibrate nanocrystallized (TRICOR) 145 mg tablet Take 1 Tab by mouth daily. 90 Tab 1    coenzyme q10 (CO Q-10) 10 mg cap Take  by mouth.  multivitamin (ONE A DAY) tablet Take 1 Tab by mouth daily.  aspirin 81 mg tablet Take 81 mg by mouth.  docusate sodium (COLACE) 100 mg capsule Take 1 Capsule by mouth two (2) times a day for 90 days. (Patient not taking: Reported on 6/28/2021) 60 Capsule 2    bisacodyL 5 mg tab Take 5 mg by mouth daily. (Patient not taking: Reported on 6/28/2021) 3 Tablet 0       Review of Systems   Constitutional: Negative for chills, diaphoresis, fever, malaise/fatigue and weight loss. Respiratory: Negative for cough, hemoptysis, shortness of breath and wheezing. Cardiovascular: Negative for chest pain, palpitations and leg swelling. Gastrointestinal: Negative for abdominal pain, diarrhea, heartburn, nausea and vomiting.    Genitourinary: Negative for dysuria, frequency, hematuria and urgency. Musculoskeletal: Negative for joint pain and myalgias. Skin: Negative for itching and rash. Neurological: Negative for dizziness, seizures, weakness and headaches. Psychiatric/Behavioral: Negative for depression. The patient does not have insomnia. Objective:     Visit Vitals  /83 (BP 1 Location: Left upper arm, BP Patient Position: Sitting)   Pulse 93   Temp 98.1 °F (36.7 °C) (Temporal)   Resp 16   Ht 5' 3\" (1.6 m)   Wt 58.7 kg (129 lb 6.4 oz)   SpO2 98%   BMI 22.92 kg/m²       ECOG Performance Status (grade): 0   0 - able to carry on all pre-disease activity w/out restriction  1 - restricted but able to carry out light work  2 - ambulatory and can self- care but unable to carry out work  3 - bed or chair >50% of waking hours  4 - completely disable, total care, confined to bed or chair    Physical Exam  Constitutional:       Appearance: Normal appearance. HENT:      Head: Normocephalic and atraumatic. Eyes:      Pupils: Pupils are equal, round, and reactive to light. Cardiovascular:      Rate and Rhythm: Normal rate and regular rhythm. Heart sounds: Normal heart sounds. Pulmonary:      Effort: Pulmonary effort is normal.      Breath sounds: Normal breath sounds. Abdominal:      General: Bowel sounds are normal.      Palpations: Abdomen is soft. Tenderness: There is no abdominal tenderness. There is no guarding. Musculoskeletal:         General: Normal range of motion. Cervical back: Neck supple. Right lower leg: No edema. Left lower leg: No edema. Skin:     General: Skin is warm. Neurological:      General: No focal deficit present. Mental Status: She is alert and oriented to person, place, and time. Mental status is at baseline. Diagnostics:      No results found for this or any previous visit (from the past 96 hour(s)).     Imaging:  No results found for this or any previous visit. Results for orders placed during the hospital encounter of 06/10/21    XR CHEST PORT    Narrative  ONE VIEW CHEST RADIOGRAPH    INDICATION: post op. Status post left breast lumpectomy and sentinel lymph node  biopsy with right Mediport placement. COMPARISON: Chest radiograph 5/14/2021. TECHNIQUE: AP view of the chest    FINDINGS:    LINES/TUBES: Right chest wall Mediport tip is in the SVC. MEDIASTINUM: Cardiac silhouette is within normal limits. LUNGS: Low lung volumes. No pneumothorax. Minimal left basilar atelectasis. No  evidence for pneumonia. No pleural effusion. BONES/OTHER: No acute osseous abnormality. Surgical staples and subcutaneous air  left breast and axilla consistent with recent surgery. Impression  Right chest wall Mediport tip in the SVC. No pneumothorax. Results for orders placed during the hospital encounter of 12/23/12    CT SINUSES WO CONT    Narrative  CT paranasal sinuses    CPT CODE: 60660    HISTORY: Headache and malaise    COMPARISON: None. TECHNIQUE:  Volumetric axial scans of the paranasal sinuses were performed. Sagittal and coronal reformations were performed to improve demonstration of  orbital meatal complexes and nasal drainage passageways. FINDINGS:    The nasal septum demonstrates mild right-to-left deviation. The bilateral nasal  cavities appear patent. The right inferior turbinate is hypertrophied. The  paranasal sinuses appear patent including bilateral frontal, ethmoidal,  sphenoidal and maxillary sinuses. The right middle turbinate is enlarged and  aerated. The bilateral osteomeatal complexes appear patent. No evidence of  soft tissue density, mucosal thickening or air fluid level identified. Visualized portion of facial bones and both orbits appear unremarkable. Impression  :    No evidence for sinusitis. Hypertrophy inferior nasal turbinate - allergic rhinitis.         Pathology  5/4/2021     LEFT BREAST, 3:30, 8 CM FROM NIPPLE, BIOPSY:   INVASIVE DUCTAL CARCINOMA. INVASIVE CARCINOMA OF THE BREAST: Biopsy   SPECIMEN   Procedure: Needle biopsy   Specimen Laterality: Left   TUMOR   Tumor Site: Clock position - 3 o'clock   Histologic Type: Invasive carcinoma of no special type (ductal)   Glandular (Acinar) / Tubular Differentiation: Score 2   Nuclear Pleomorphism: Score 3   Mitotic Rate: Score 3   Overall Grade: Grade 3 (scores of 8 or 9)   Tumor Size: 6 mm   Ductal Carcinoma In Situ (DCIS): Not identified   Lymphovascular Invasion: Not identified   Microcalcifications: Not identified     ER/TX Results:   Estrogen Receptor (ER): Negative (0%, Internal controls present and stain as expected). Progesterone Receptor (PgR): Negative (0%, Internal controls present and stain as expected). Her-2 (IHC Method): Negative (score 1+)               Assessment:                                        1. Breast cancer, stage 1, estrogen receptor negative, left ([facility]    2. Triple negative malignant neoplasm of breast ([facility]        Plan:                                        #  Left breast cancer, triple negative invasive ductal adenocarcinoma   -- Diagnosed 6/24/2021 (Active) Stage IB, T1b, pN0, M0, G3, HER2 Neg, ER Neg, TX N  -- Left triple negative invasive ductal adenocarcinoma s/p lumpectomy with SLNBx (pT1bN0, grade 3, 0/2 lymph nodes involved, negative margins)  -- 03/23/21 Screening mammogram reported possible posterior lateral left breast mass. -- 04/27/21 Diagnostic mammogram reported left breast oval, heterogeneously hypoechoic mass  with microlobulated margins at the 3:30 position, 8 cfn, measuring 0.8 x 0.7 x 0.3 cm.  -- 05/04/21 Biopsy reported invasive ductal carcinoma, grade 3, ER/TX negative, HER2 negative. -- 05/25/21 MRI breast reported left breast mass at 3:30, 0.7 cm malignant mass (correlating to biopsied mass).   -- 06/10/21 S.p Tag localized lumpectomy left breast with left sentinel lymph node biopsy with ultrasound-guided right internal jugular Mediport placement. Lumpectomy with SLNBx reported invasive ductal adenocarcinoma, grade 3, 5 mm, 0/2 lymph nodes involved, and negative margins (9 mm posterior). pT1bN0  -- Today I have reviewed with the patient and family about the diagnosis and natural history of the disease. We discussed triple negative breast cancer disease, treatment rationale, and the indication for chemotherapy. Given her high graded histology and TNBC we have suggested adjuvantly chemotherapy. For those patients with node-negative, T <1 cm, we have recommended non-anthracycline-based regimens to avoid the risks associated with anthracyclines. We have discussed briefly about the potential risk and benefits of the non-anthracycline-based regimens of TC given every three weeks for four cycles. The patient was agreeable with the plan. Plan:  -- Labs: CBC, CMP, CA 27-29, CA 15-3  -- Nurse Navigator referral for cold cap in minimizing hair loss  -- The patient will f/u with RadOnc for adjuvant XRT  -- The patient will RTC in 1-2 weeks for prechemo assessment/education. TC  Day 1: Docetaxel 75mg/m2 IV over 60 minutes  Day 1: Cyclophosphamide 600mg/m2 IV over 30 minutes. Repeat cycle every 21 days for 4 cycles      Orders Placed This Encounter    METABOLIC PANEL, COMPREHENSIVE     Standing Status:   Future     Standing Expiration Date:   6/29/2022    CA 27.29     Standing Status:   Future     Standing Expiration Date:   6/29/2022    CBC WITH AUTOMATED DIFF     Standing Status:   Future     Standing Expiration Date:   6/29/2022    CANCER ANTIGEN (CA) 15-3     Standing Status:   Future     Standing Expiration Date:   6/29/2022    calcium citrate-vitamin d3 (CITRACAL+D) 315 mg-5 mcg (200 unit) tab     Sig: Take 1 Tablet by mouth daily (with breakfast). Ms. Mario Harrison has a reminder for a \"due or due soon\" health maintenance.  I have asked that she contact her primary care provider for follow-up on this health maintenance. All of patient's questions answered to their apparent satisfaction. They verbally show understanding and agreement with aforementioned plan. I would like to thank Dr. Joyice Koyanagi, M.D.  for allowing me to participate in the care of this very pleasant patient. If I can be of further assistance please do not hesitate to call. Osorio Galeano MD  6/28/2021          About 60 minutes were spent for this encounter with more than 50% of the time spent in face-to-face counseling, discussing on diagnosis and management plan going forward, and co-ordination of care. Parts of this document has been produced using Dragon dictation system. Unrecognized errors in transcription may be present. Please do not hesitate to reach out for any questions or clarifications. CC: Chino Francisco MD; [place] Valeriy Bolanos M.D.; Joyice Koyanagi, M.D.

## 2021-06-29 LAB
CANCER AG15-3 SERPL-ACNC: 17.2 U/ML (ref 0–25)
CANCER AG27-29 SERPL-ACNC: 20.7 U/ML (ref 0–38.6)

## 2021-06-30 ENCOUNTER — TELEPHONE (OUTPATIENT)
Dept: ONCOLOGY | Age: 66
End: 2021-06-30

## 2021-07-07 DIAGNOSIS — C50.912 BREAST CANCER, STAGE 1, ESTROGEN RECEPTOR NEGATIVE, LEFT (HCC): Primary | ICD-10-CM

## 2021-07-07 DIAGNOSIS — Z17.1 BREAST CANCER, STAGE 1, ESTROGEN RECEPTOR NEGATIVE, LEFT (HCC): Primary | ICD-10-CM

## 2021-07-07 RX ORDER — PALONOSETRON 0.05 MG/ML
0.25 INJECTION, SOLUTION INTRAVENOUS ONCE
Status: CANCELLED | OUTPATIENT
Start: 2021-07-16 | End: 2021-07-14

## 2021-07-07 RX ORDER — SODIUM CHLORIDE 9 MG/ML
25 INJECTION, SOLUTION INTRAVENOUS CONTINUOUS
Status: CANCELLED | OUTPATIENT
Start: 2021-07-16

## 2021-07-07 RX ORDER — ALBUTEROL SULFATE 0.83 MG/ML
2.5 SOLUTION RESPIRATORY (INHALATION) AS NEEDED
Status: CANCELLED
Start: 2021-07-16

## 2021-07-07 RX ORDER — ACETAMINOPHEN 325 MG/1
650 TABLET ORAL AS NEEDED
Status: CANCELLED
Start: 2021-07-16

## 2021-07-07 RX ORDER — ONDANSETRON 2 MG/ML
8 INJECTION INTRAMUSCULAR; INTRAVENOUS AS NEEDED
Status: CANCELLED | OUTPATIENT
Start: 2021-07-16

## 2021-07-07 RX ORDER — SODIUM CHLORIDE 9 MG/ML
10 INJECTION INTRAMUSCULAR; INTRAVENOUS; SUBCUTANEOUS AS NEEDED
Status: CANCELLED | OUTPATIENT
Start: 2021-07-16

## 2021-07-07 RX ORDER — HEPARIN 100 UNIT/ML
300-500 SYRINGE INTRAVENOUS AS NEEDED
Status: CANCELLED
Start: 2021-07-16

## 2021-07-07 RX ORDER — DIPHENHYDRAMINE HYDROCHLORIDE 50 MG/ML
25 INJECTION, SOLUTION INTRAMUSCULAR; INTRAVENOUS AS NEEDED
Status: CANCELLED
Start: 2021-07-16

## 2021-07-07 RX ORDER — DEXAMETHASONE SODIUM PHOSPHATE 4 MG/ML
10 INJECTION, SOLUTION INTRA-ARTICULAR; INTRALESIONAL; INTRAMUSCULAR; INTRAVENOUS; SOFT TISSUE ONCE
Status: CANCELLED
Start: 2021-07-16 | End: 2021-07-14

## 2021-07-07 RX ORDER — EPINEPHRINE 1 MG/ML
0.3 INJECTION, SOLUTION, CONCENTRATE INTRAVENOUS AS NEEDED
Status: CANCELLED | OUTPATIENT
Start: 2021-07-16

## 2021-07-07 RX ORDER — SODIUM CHLORIDE 0.9 % (FLUSH) 0.9 %
10 SYRINGE (ML) INJECTION AS NEEDED
Status: CANCELLED | OUTPATIENT
Start: 2021-07-16

## 2021-07-07 RX ORDER — DIPHENHYDRAMINE HYDROCHLORIDE 50 MG/ML
50 INJECTION, SOLUTION INTRAMUSCULAR; INTRAVENOUS AS NEEDED
Status: CANCELLED
Start: 2021-07-16

## 2021-07-07 RX ORDER — HYDROCORTISONE SODIUM SUCCINATE 100 MG/2ML
100 INJECTION, POWDER, FOR SOLUTION INTRAMUSCULAR; INTRAVENOUS AS NEEDED
Status: CANCELLED | OUTPATIENT
Start: 2021-07-16

## 2021-07-09 ENCOUNTER — NURSE NAVIGATOR (OUTPATIENT)
Dept: SURGERY | Age: 66
End: 2021-07-09

## 2021-07-09 NOTE — PROGRESS NOTES
Follow up telephone call to patient do discuss scalp cooling system. Information provided on Arctic cooling system as well as [employer]. I advised patient to contact her insurance company to verify coverage. Printed copies of information mailed to patient.

## 2021-07-13 ENCOUNTER — OFFICE VISIT (OUTPATIENT)
Dept: ONCOLOGY | Age: 66
End: 2021-07-13
Payer: MEDICARE

## 2021-07-13 ENCOUNTER — HOSPITAL ENCOUNTER (OUTPATIENT)
Dept: INFUSION THERAPY | Age: 66
Discharge: HOME | End: 2021-07-13
Payer: MEDICARE

## 2021-07-13 VITALS
RESPIRATION RATE: 16 BRPM | HEIGHT: 63 IN | TEMPERATURE: 98.4 F | HEART RATE: 92 BPM | DIASTOLIC BLOOD PRESSURE: 85 MMHG | SYSTOLIC BLOOD PRESSURE: 128 MMHG | BODY MASS INDEX: 23.07 KG/M2 | WEIGHT: 130.2 LBS

## 2021-07-13 VITALS
OXYGEN SATURATION: 98 % | TEMPERATURE: 98.4 F | WEIGHT: 130.2 LBS | HEART RATE: 92 BPM | BODY MASS INDEX: 23.07 KG/M2 | DIASTOLIC BLOOD PRESSURE: 85 MMHG | RESPIRATION RATE: 16 BRPM | HEIGHT: 63 IN | SYSTOLIC BLOOD PRESSURE: 128 MMHG

## 2021-07-13 DIAGNOSIS — C50.919 TRIPLE NEGATIVE MALIGNANT NEOPLASM OF BREAST (HCC): Primary | ICD-10-CM

## 2021-07-13 LAB
ALBUMIN SERPL-MCNC: 4 G/DL (ref 3.4–5)
ALBUMIN/GLOB SERPL: 1.3 {RATIO} (ref 0.8–1.7)
ALP SERPL-CCNC: 104 U/L (ref 45–117)
ALT SERPL-CCNC: 36 U/L (ref 13–56)
ANION GAP SERPL CALC-SCNC: 4 MMOL/L (ref 3–18)
AST SERPL-CCNC: 30 U/L (ref 10–38)
BASOPHILS # BLD: 0 K/UL (ref 0–0.1)
BASOPHILS NFR BLD: 1 % (ref 0–2)
BILIRUB SERPL-MCNC: 0.4 MG/DL (ref 0.2–1)
BUN SERPL-MCNC: 16 MG/DL (ref 7–18)
BUN/CREAT SERPL: 18 (ref 12–20)
CALCIUM SERPL-MCNC: 9.6 MG/DL (ref 8.5–10.1)
CHLORIDE SERPL-SCNC: 107 MMOL/L (ref 100–111)
CO2 SERPL-SCNC: 29 MMOL/L (ref 21–32)
CREAT SERPL-MCNC: 0.88 MG/DL (ref 0.6–1.3)
DIFFERENTIAL METHOD BLD: ABNORMAL
EOSINOPHIL # BLD: 0.1 K/UL (ref 0–0.4)
EOSINOPHIL NFR BLD: 2 % (ref 0–5)
ERYTHROCYTE [DISTWIDTH] IN BLOOD BY AUTOMATED COUNT: 13.1 % (ref 11.6–14.5)
GLOBULIN SER CALC-MCNC: 3.1 G/DL (ref 2–4)
GLUCOSE SERPL-MCNC: 85 MG/DL (ref 74–99)
HCT VFR BLD AUTO: 40.5 % (ref 35–45)
HGB BLD-MCNC: 12.7 G/DL (ref 12–16)
LYMPHOCYTES # BLD: 1.6 K/UL (ref 0.9–3.6)
LYMPHOCYTES NFR BLD: 39 % (ref 21–52)
MCH RBC QN AUTO: 29.9 PG (ref 24–34)
MCHC RBC AUTO-ENTMCNC: 31.4 G/DL (ref 31–37)
MCV RBC AUTO: 95.3 FL (ref 74–97)
MONOCYTES # BLD: 0.5 K/UL (ref 0.05–1.2)
MONOCYTES NFR BLD: 13 % (ref 3–10)
NEUTS SEG # BLD: 1.9 K/UL (ref 1.8–8)
NEUTS SEG NFR BLD: 46 % (ref 40–73)
PLATELET # BLD AUTO: 262 K/UL (ref 135–420)
PMV BLD AUTO: 9.3 FL (ref 9.2–11.8)
POTASSIUM SERPL-SCNC: 4.4 MMOL/L (ref 3.5–5.5)
PROT SERPL-MCNC: 7.1 G/DL (ref 6.4–8.2)
RBC # BLD AUTO: 4.25 M/UL (ref 4.2–5.3)
SODIUM SERPL-SCNC: 140 MMOL/L (ref 136–145)
WBC # BLD AUTO: 4.2 K/UL (ref 4.6–13.2)

## 2021-07-13 PROCEDURE — 3017F COLORECTAL CA SCREEN DOC REV: CPT | Performed by: NURSE PRACTITIONER

## 2021-07-13 PROCEDURE — G8427 DOCREV CUR MEDS BY ELIG CLIN: HCPCS | Performed by: NURSE PRACTITIONER

## 2021-07-13 PROCEDURE — G9899 SCRN MAM PERF RSLTS DOC: HCPCS | Performed by: NURSE PRACTITIONER

## 2021-07-13 PROCEDURE — 1101F PT FALLS ASSESS-DOCD LE1/YR: CPT | Performed by: NURSE PRACTITIONER

## 2021-07-13 PROCEDURE — G8399 PT W/DXA RESULTS DOCUMENT: HCPCS | Performed by: NURSE PRACTITIONER

## 2021-07-13 PROCEDURE — 85025 COMPLETE CBC W/AUTO DIFF WBC: CPT

## 2021-07-13 PROCEDURE — G8420 CALC BMI NORM PARAMETERS: HCPCS | Performed by: NURSE PRACTITIONER

## 2021-07-13 PROCEDURE — G8432 DEP SCR NOT DOC, RNG: HCPCS | Performed by: NURSE PRACTITIONER

## 2021-07-13 PROCEDURE — 80053 COMPREHEN METABOLIC PANEL: CPT

## 2021-07-13 PROCEDURE — 1090F PRES/ABSN URINE INCON ASSESS: CPT | Performed by: NURSE PRACTITIONER

## 2021-07-13 PROCEDURE — G0463 HOSPITAL OUTPT CLINIC VISIT: HCPCS | Performed by: NURSE PRACTITIONER

## 2021-07-13 PROCEDURE — G8536 NO DOC ELDER MAL SCRN: HCPCS | Performed by: NURSE PRACTITIONER

## 2021-07-13 PROCEDURE — 99215 OFFICE O/P EST HI 40 MIN: CPT | Performed by: NURSE PRACTITIONER

## 2021-07-13 PROCEDURE — G8752 SYS BP LESS 140: HCPCS | Performed by: NURSE PRACTITIONER

## 2021-07-13 PROCEDURE — 36415 COLL VENOUS BLD VENIPUNCTURE: CPT

## 2021-07-13 PROCEDURE — G8754 DIAS BP LESS 90: HCPCS | Performed by: NURSE PRACTITIONER

## 2021-07-13 RX ORDER — LIDOCAINE AND PRILOCAINE 25; 25 MG/G; MG/G
CREAM TOPICAL AS NEEDED
Qty: 30 G | Refills: 1 | Status: SHIPPED | OUTPATIENT
Start: 2021-07-13 | End: 2022-02-04 | Stop reason: ALTCHOICE

## 2021-07-13 RX ORDER — DEXAMETHASONE 4 MG/1
8 TABLET ORAL 2 TIMES DAILY WITH MEALS
Qty: 36 TABLET | Refills: 0 | Status: SHIPPED | OUTPATIENT
Start: 2021-07-13 | End: 2021-08-25 | Stop reason: SDUPTHER

## 2021-07-13 RX ORDER — ONDANSETRON 8 MG/1
8 TABLET, ORALLY DISINTEGRATING ORAL
Qty: 30 TABLET | Refills: 0 | Status: SHIPPED | OUTPATIENT
Start: 2021-07-13 | End: 2022-02-04 | Stop reason: ALTCHOICE

## 2021-07-13 NOTE — PROGRESS NOTES
[facility] Progress Note    Date: 2021    Name: Larwance Harada    MRN: [id number]         : 1955    Pre chemo labs    Ms. Prince Mckeon arrived to [facility] at [phone] for peripheral lab draw. Ms. Amaya Hartman vitals were reviewed. Visit Vitals  /85   Pulse 92   Temp 98.4 °F (36.9 °C)   Resp 16   Ht 5' 3\" (1.6 m)   Wt 59.1 kg (130 lb 3.2 oz)   BMI 23.06 kg/m²       Blood drawn for labs via Right antecubital venipuncture x1 attempt using 23g butterfly collection set, gauze and coban applied to site. Specimen sent to lab for cbc w/diff and CMP. Ms. Prince Mckeon tolerated well without complaints. Ms. Prince Mckeon was discharged from [place] [address] in stable condition at 1230.     Torrie Ormond, RN  2021

## 2021-07-13 NOTE — PROGRESS NOTES
Chemotherapy Teaching / Education    Patient Name: Sivakumar Mercado  YOB: 1955    Date: 7/13/2021        Diagnosis: Triple negative left breast cancer    Stage: Diagnosed 6/24/2021 (Active) Stage IB, T1b, pN0, M0, G3, HER2 Neg, ER Neg, OH Neg    Treatment: TC  Day 1: Docetaxel 75mg/m2 IV over 60 minutes  Day 1: Cyclophosphamide 600mg/m2 IV over 30 minutes. Repeat cycle every 21 days for 4 cycles    Sivakumar Mercado is a 76y.o.  year old female who was seen for consultation of triple negative left breast cancer. The patient was accompanied by her family during this visit. The patient has a past medical history significant of hypertension, hypercholesterolemia, obstructive sleep apnea. Her Oncology history stated as below:  -- 03/23/21 Screening mammogram reported possible posterior lateral left breast mass. -- 04/27/21 Diagnostic mammogram reported left breast oval, heterogeneously hypoechoic mass with microlobulated margins at the 3:30 position, 8 cfn, measuring 0.8 x 0.7 x 0.3 cm.  -- 05/04/21 Biopsy reported invasive ductal carcinoma, grade 3, ER/OH negative, HER2 negative. -- 05/25/21 MRI breast reported left breast mass at 3:30, 0.7 cm malignant mass (correlating to biopsied mass). -- 06/10/21 S.p Tag localized lumpectomy left breast with left sentinel lymph node biopsy with ultrasound-guided right internal jugular Mediport placement. Lumpectomy with SLNBx reported invasive ductal adenocarcinoma, grade 3, 5 mm, 0/2 lymph nodes involved, and negative margins (9 mm posterior). pT1bN0  The patient otherwise has no other complaints. Denied fever, chills, night sweat, unintentional weight loss, skin lumps or bumps, acute bleeding or bruising issues. Denied headache, acute vision change, dizziness, chest pain, worsen shortness of breath, palpitation, productive cough, nausea, vomiting, abdominal pain, altered bowel habits, dysuria, worsen bone pain or back pain, new focal numbness or weakness.    Patient present today for chemotherapy education on Docetaxel 75mg/m2 IV over 60 minutes . cyclophosphamide 600mg/m2 IV over 30 minutes. Repeat cycle every 21 days for 4 cycles    LEARNING ASSESSMENT:    Patient- Barriers to learning [] Yes /  [x] No (hearing impaired / cognitive / language / vision ) Primary Language; English  Preferred method of teaching;   reading[x] Yes /  [] No,    demonstration/visual[] Yes /  [] No    Co-learner present ? [] Yes /  [x] No       Risk, benefits, and possible side effects of TC  were discussed, reviewed, and given to Delemarychuy Avitia . The importance of pre-chemotherapy and post chemotherapy medications and side effects were reviewed and given to patient. .common side effects such as infections, mouth sores, hair loss or thinning, GI issues, N/V, neuropathy, Myelotoxicity,Hepatotoxicity discuss with patient. Encourage Hydration. All questions were answered to his/her satisfaction. Teach back method used. Patient sign inform consent. Medi port side assess and intact with redness noted. Printed inform given to patient.        Patient verbalized understanding [x] Yes /  [] No  Acceptance of teaching, eager to learn [x] Yes /  [] No  Acceptance of teaching but needs reinforcement [] Yes /  [x] No      Total time:   60 minutes of face to face time spent with patient was in counseling, coordination of care, chemotherapy, immunotherapy education      Trudy Love NP  7/13/2021

## 2021-07-13 NOTE — PATIENT INSTRUCTIONS
Breast Cancer: Care Instructions  Your Care Instructions     Breast cancer occurs when abnormal cells grow out of control in the breast. These cancer cells can spread within the breast, to nearby lymph nodes and other tissues, and to other parts of the body. Being treated for cancer can weaken your body, and you may feel very tired. Get the rest your body needs so you can feel better. Finding out that you have cancer is scary. You may feel many emotions and may need some help coping. Seek out family, friends, and counselors for support. You also can do things at home to make yourself feel better while you go through treatment. Call the [employer] ([phone]) or visit its website at [email or web address] [employer] for more information. Follow-up care is a key part of your treatment and safety. Be sure to make and go to all appointments, and call your doctor if you are having problems. It's also a good idea to know your test results and keep a list of the medicines you take. How can you care for yourself at home? · Take your medicines exactly as prescribed. Call your doctor if you think you are having a problem with your medicine. You may get medicine for nausea and vomiting if you have these side effects. · Follow your doctor's instructions to relieve pain. Pain from cancer and surgery can almost always be controlled. Use pain medicine when you first notice pain, before it becomes severe. · Eat healthy food. If you do not feel like eating, try to eat food that has protein and extra calories to keep up your strength and prevent weight loss. Drink liquid meal replacements for extra calories and protein. Try to eat your main meal early. · Get some physical activity every day, but do not get too tired. Keep doing the hobbies you enjoy as your energy allows. · Do not smoke. Smoking can make your cancer worse. If you need help quitting, talk to your doctor about stop-smoking programs and medicines.  These can increase your chances of quitting for good. · Take steps to control your stress and workload. Learn relaxation techniques. ? Share your feelings. Stress and tension affect our emotions. By expressing your feelings to others, you may be able to understand and cope with them. ? Consider joining a support group. Talking about a problem with your spouse, a good friend, or other people with similar problems is a good way to reduce tension and stress. ? Express yourself through art. Try writing, crafts, dance, or art to relieve stress. Some dance, writing, or art groups may be available just for people who have cancer. ? Be kind to your body and mind. Getting enough sleep, eating a healthy diet, and taking time to do things you enjoy can contribute to an overall feeling of balance in your life and can help reduce stress. ? Get help if you need it. Discuss your concerns with your doctor or counselor. · If you are vomiting or have diarrhea:  ? Drink plenty of fluids to prevent dehydration. Choose water and other caffeine-free clear liquids. If you have kidney, heart, or liver disease and have to limit fluids, talk with your doctor before you increase the amount of fluids you drink. ? When you are able to eat, try clear soups, mild foods, and liquids until all symptoms are gone for 12 to 48 hours. Other good choices include dry toast, crackers, cooked cereal, and gelatin dessert, such as Jell-O.  · If you have not already done so, prepare a list of advance directives. Advance directives are instructions to your doctor and family members about what kind of care you want if you become unable to speak or express yourself. When should you call for help? Call 911 anytime you think you may need emergency care. For example, call if:    · You passed out (lost consciousness).    Call your doctor now or seek immediate medical care if:    · You have a fever.     · You have abnormal bleeding.     · You think you have an infection.     · You have new or worse pain.     · You have new symptoms, such as a cough, belly pain, vomiting, diarrhea, or a rash. Watch closely for changes in your health, and be sure to contact your doctor if:    · You are much more tired than usual.     · You have swollen glands in your armpits, groin, or neck.     · You do not get better as expected. Where can you learn more? Go to http://www.91datong.com.com/  Enter V321 in the search box to learn more about \"Breast Cancer: Care Instructions. \"  Current as of: December 17, 2020               Content Version: 12.8  © 0789-2876 Point. Care instructions adapted under license by [employer] (which disclaims liability or warranty for this information). If you have questions about a medical condition or this instruction, always ask your healthcare professional. [place] [address] any warranty or liability for your use of this information. Learning About Breast Cancer Treatments  Your Care Instructions     Breast cancer means abnormal cells grow out of control in one or both breasts. These cancer cells can spread from the breast to nearby lymph nodes and other tissues. They can also spread to other parts of the body. The type and stage of cancer you have is based on:  · Where in the breast the cancer started. · The genetics of those cancer cells. · How far cancer has spread within the breast, to nearby tissues, or to other organs. · What the cancer cells look like under a microscope. · Whether there is cancer in the nearby lymph nodes. Tests that help find the stage of your cancer can help choose the right treatment for you. These tests can include a breast biopsy, lymph node biopsy, blood tests, and X-rays. You may need other tests as well, such as a bone, CT, or MRI scan. Whether you have more tests depends on your symptoms and the stage of the cancer.   When you find out that you have cancer, you may feel many emotions and may need some help coping. Seek out family, friends, and counselors for support. You also can do things at home to make yourself feel better while you go through treatment. Call the [facility] (7-988.254.1335) or visit its website at [employer] [employer] for more information. How is breast cancer treated? Your doctor may combine treatments. This is a common way to treat breast cancer. Treatment depends on what type and stage of cancer you have. You may have:  · Surgery to remove the cancer. · Radiation. This uses high-dose X-rays to kill cancer cells and shrink tumors. · Chemotherapy. This uses medicine to kill cancer cells. · Hormone therapy. This uses medicines such as tamoxifen. It limits the effect of the hormone estrogen. This hormone can help some types of breast cancer cells to grow. · Targeted therapy. This uses medicines such as trastuzumab (Herceptin) to help your immune system fight the cancer. What surgeries are done for breast cancer? Surgery is a key part of treatment for breast cancer. The main types of surgeries are:  · Breast-conserving surgery, such as lumpectomy. It removes the cancer in the breast and just enough tissue to make sure that all of the cancer was removed. · Surgery to remove the breast. This includes:  ? Total mastectomy. This removes the whole breast.  ? Nipple-sparing mastectomy. This removes the whole breast but leaves the nipple. ? Modified radical mastectomy. This removes the whole breast and the lymph nodes under the arm (axillary lymph nodes). ? Radical mastectomy. This removes the whole breast, the chest muscles, and all the lymph nodes under the arm. If lymph nodes under the arm are removed, they are looked at under the microscope to check for cancer. There are two types of lymph node removal:  · [place] lymph node biopsy removes the lymph node that is the first to receive lymph fluid from the tumor.  If cancer has spread from the breast to the lymph nodes, cancer cells most often show up in the sentinel node first.  · Axillary lymph node dissection removes most of the lymph nodes in the armpit. The type of surgery you have depends on the size, location, and type of the cancer. It also depends on your overall health and personal preferences. Even if your doctor removes all the cancer that can be seen at the time of your surgery, you may still need more treatment. You may get radiation, chemotherapy, or hormone therapy after surgery to try to kill any cancer cells that may be left. No matter what kind of surgery you have, you will get information about why you are having it, what its risks are, how to prepare, and what to do after surgery. Follow-up care is a key part of your treatment and safety. Be sure to make and go to all appointments, and call your doctor if you are having problems. It's also a good idea to know your test results and keep a list of the medicines you take. Where can you learn more? Go to http://www.gray.com/  Enter X810 in the search box to learn more about \"Learning About Breast Cancer Treatments. \"  Current as of: December 17, 2020               Content Version: 12.8  © 1484-2124 [employer]. Care instructions adapted under license by [employer] (which disclaims liability or warranty for this information). If you have questions about a medical condition or this instruction, always ask your healthcare professional. [place] [address] any warranty or liability for your use of this information. Docetaxel (By injection)   Docetaxel (cpa-an-FUN-el)  Treats cancer, including breast, lung, prostate, stomach, and head and neck cancer. Brand Name(s): DOCEtaxel NovaPlus, Taxotere   There may be other brand names for this medicine. When This Medicine Should Not Be Used: This medicine is not right for everyone.  You should not receive it if you had an allergic reaction to docetaxel or to a preservative called polysorbate 80, if you are pregnant, or if you have a low white blood cell count. How to Use This Medicine:   Injectable  · Medicines used to treat cancer are very strong and can have many side effects. Before receiving this medicine, make sure you understand all the risks and benefits. It is important for you to work closely with your doctor during your treatment. · Your doctor will prescribe your dose and schedule. This medicine is given through a needle placed in a vein. · You will receive this medicine while you are in a hospital or cancer treatment center. A nurse or other trained health professional will give you this medicine. · You will be given a steroid medicine for a few days to prevent side effects from docetaxel. Carefully follow the instructions about how to take the steroid. If you forget to take the steroid, tell your doctor before you receive the dose. · Read and follow the patient instructions that come with this medicine. Talk to your doctor or pharmacist if you have any questions. · Missed dose: This medicine needs to be given on a fixed schedule. If you miss a dose, call your doctor, home health caregiver, or treatment clinic for instructions. Drugs and Foods to Avoid:   Ask your doctor or pharmacist before using any other medicine, including over-the-counter medicines, vitamins, and herbal products. · Some medicines can affect how docetaxel works. Tell your doctor if you are using atazanavir, clarithromycin, indinavir, itraconazole, ketoconazole, nefazodone, nelfinavir, ritonavir, saquinavir, telithromycin, or voriconazole. Warnings While Using This Medicine:   · It is not safe to take this medicine during pregnancy. It could harm an unborn baby. Tell your doctor right away if you become pregnant.   · Tell your doctor if you are breastfeeding, or if you have liver disease, blood disorders, edema (fluid retention), or any type of infection. · This medicine can cause the following problems:  ¨ Serious allergic reactions  ¨ Serious skin reactions  ¨ Acute myeloid leukemia (a type of cancer)  ¨ Changes in vision  · This medicine contains alcohol, and may make you dizzy or drowsy. Do not drive or do anything that could be dangerous until you know how this medicine affects you. · This medicine may make you bleed, bruise, or get infections more easily. Take precautions to prevent illness and injury. Wash your hands often. · Cancer medicine can cause nausea or vomiting, sometimes even after you receive medicine to prevent these effects. Ask your doctor or nurse about other ways to control any nausea or vomiting that might happen. · Your doctor will do lab tests at regular visits to check on the effects of this medicine. Keep all appointments. Possible Side Effects While Using This Medicine:   Call your doctor right away if you notice any of these side effects:  · Allergic reaction: Itching or hives, swelling in your face or hands, swelling or tingling in your mouth or throat, chest tightness, trouble breathing  · Blistering, peeling, or red skin rash  · Blurred vision, loss of vision  · Fever, chills, cough, sore throat, and body aches  · Numbness, tingling, or burning pain in your hands, arms, legs, or feet  · Rapid weight gain, swelling in your hands, ankles, or feet  · Severe tiredness or weakness  · Unusual bleeding or bruising  If you notice these less serious side effects, talk with your doctor:   · Hair loss  · Nausea, vomiting, diarrhea, constipation  · Pain, itching, burning, swelling, or a lump under your skin where the needle is placed  · Sores or white patches on your lips, mouth, or throat  If you notice other side effects that you think are caused by this medicine, tell your doctor. Call your doctor for medical advice about side effects.  You may report side effects to FDA at 9-903-OAA-7275  © 2017 [facility] Information is for End User's use only and may not be sold, redistributed or otherwise used for commercial purposes. The above information is an  only. It is not intended as medical advice for individual conditions or treatments. Talk to your doctor, nurse or pharmacist before following any medical regimen to see if it is safe and effective for you. Docetaxel (Docefrez, NovaPlus Docetaxel, Taxotere) - (By injection)   Why this medicine is used:   Treats cancer, including breast, lung, prostate, stomach, and head and neck cancer. Contact a nurse or doctor right away if you have:  · Fever, chills, cough, sore throat, and body aches  · Unusual bleeding or bruising  · Redness, pain, swelling, or blisters on your hands or feet  · Swelling or inflammation of the mouth     Common side effects:  · Nausea, vomiting, diarrhea, weakness  · Numbness, tingling, or burning pain in your hands, arms, legs, or feet  · Swelling in your hands, ankles, or feet  · Hair loss, nail changes  © 2017 [address] is for End User's use only and may not be sold, redistributed or otherwise used for commercial purposes. Cyclophosphamide (By injection)   Cyclophosphamide (con-lmyi-JJY-fa-mide)  Treats cancer, including leukemia and lymphomas, and nephrotic syndrome. Brand Name(s): Amer[employer] Choice cyclophosphamide, PremierPro Rx cyclophosphamide, cyclophosphamide Novaplus   There may be other brand names for this medicine. When This Medicine Should Not Be Used: This medicine is not right for everyone. You should not receive it if you have had an allergic reaction to cyclophosphamide, or if you are pregnant or breastfeeding. How to Use This Medicine:   Injectable  · You will receive this medicine while you are in a hospital or cancer treatment center. A nurse or other trained health professional will give you this medicine.  It may also be given by a home health caregiver. · Your doctor will prescribe your dose and schedule. This medicine is given through a needle placed in a vein. · Tell your caregiver right away if any of the medicine gets on your skin. · Drink extra fluids so you will urinate more often and help prevent kidney problems. · Missed dose: This medicine needs to be given on a fixed schedule. If you miss a dose, call your doctor, home health caregiver, or treatment clinic for instructions. Drugs and Foods to Avoid:   Ask your doctor or pharmacist before using any other medicine, including over-the-counter medicines, vitamins, and herbal products. · Some medicines can affect how cyclophosphamide works. Tell your doctor if you are also using cyclosporine, etanercept, pentostatin, a protease inhibitor, tamoxifen, a thiazide diuretic (water pill), trastuzumab, or warfarin. · Talk to your doctor before you get any vaccines, such as a flu shot. Warnings While Using This Medicine:   · This medicine may cause birth defects if either partner is using it during conception or pregnancy. Tell your doctor right away if you or your partner becomes pregnant. Women should avoid pregnancy for 1 year after treatment ends. Men should use birth control for 4 months after treatment ends so their partner does not become pregnant. · Make sure your doctor knows if you have kidney disease, liver disease, heart disease, or lung disease. · This medicine may cause the following problems:   ¨ Infertility  ¨ Liver disease  ¨ Kidney and urinary tract problems  ¨ Heart and lung problems  ¨ Slow or delayed healing  ¨ Secondary cancers  · This medicine may make you bleed, bruise, or get infections more easily. Take precautions to prevent illness and injury. Wash your hands often. · Tell any doctor or dentist who treats you that you are using this medicine. You may need to stop using this medicine several days before you have surgery or medical tests.   · Your doctor will do lab tests at regular visits to check on the effects of this medicine. Keep all appointments. Possible Side Effects While Using This Medicine:   Call your doctor right away if you notice any of these side effects:  · Allergic reaction: Itching or hives, swelling in your face or hands, swelling or tingling in your mouth or throat, chest tightness, trouble breathing  · Blistering, peeling, red skin rash  · Blood in your urine or stools, painful urination  · Chest pain, trouble breathing  · Dark urine or pale stools, yellow skin or eyes  · Fast, pounding, or uneven heartbeat  · Fever, chills, cough, sore throat  · Rapid weight gain, swelling in your hands, ankles, or feet  · Unusual bleeding, bruising, or weakness  If you notice these less serious side effects, talk with your doctor:   · Changes in your menstrual periods  · Hair loss, changes in skin or nail color  · Nausea, vomiting, loss of appetite  · Pain, swelling, itching, or irritation where the IV needle is placed  · Sores or white patches in your mouth or throat  If you notice other side effects that you think are caused by this medicine, tell your doctor. Call your doctor for medical advice about side effects. You may report side effects to FDA at 2-798-FDA-2393  © 2017 [facility] Information is for End User's use only and may not be sold, redistributed or otherwise used for commercial purposes. The above information is an  only. It is not intended as medical advice for individual conditions or treatments. Talk to your doctor, nurse or pharmacist before following any medical regimen to see if it is safe and effective for you. Cyclophosphamide - (By mouth)   Why this medicine is used:   Treats cancer and nephrotic syndrome.   Contact a nurse or doctor right away if you have:  · Blistering, peeling, red skin rash  · Fast, pounding, or uneven heartbeat, chest pain, trouble breathing  · Fever, chills, cough, sore throat, nausea, vomiting, loss of appetite  · Unusual bleeding, bruising, or weakness  · Dark urine or pale stools, painful urination, yellow skin or eyes  · Weight gain, swelling in your hands, ankles, or feet     Common side effects:  · Changes in your menstrual periods  · Hair loss, changes in skin or nail color, sores or white patches in your mouth or throat  © 2017 [address] is for End User's use only and may not be sold, redistributed or otherwise used for commercial purposes.

## 2021-07-13 NOTE — PROGRESS NOTES
Sakina Ro presents today for   Chief Complaint   Patient presents with    Follow-up       Is someone accompanying this pt? no    Is the patient using any DME equipment during OV? no    Coordination of Care:  1. Have you been to the ER, urgent care clinic since your last visit? Hospitalized since your last visit? no    2. Have you seen or consulted any other health care providers outside of the [address] [place] [address] since your last visit? Include any pap smears or colon screening.  no

## 2021-07-14 ENCOUNTER — TELEPHONE (OUTPATIENT)
Dept: ONCOLOGY | Age: 66
End: 2021-07-14

## 2021-07-14 NOTE — TELEPHONE ENCOUNTER
Pt lvm picked up medication supposed to start taking for treatment tomorrow. Directions on bottle different from what was told at appt. Please contact pt to clarify.

## 2021-07-16 ENCOUNTER — HOSPITAL ENCOUNTER (OUTPATIENT)
Dept: INFUSION THERAPY | Age: 66
Discharge: HOME | End: 2021-07-16
Payer: MEDICARE

## 2021-07-16 VITALS
RESPIRATION RATE: 16 BRPM | TEMPERATURE: 97.4 F | DIASTOLIC BLOOD PRESSURE: 70 MMHG | HEART RATE: 75 BPM | SYSTOLIC BLOOD PRESSURE: 115 MMHG | OXYGEN SATURATION: 98 %

## 2021-07-16 DIAGNOSIS — Z17.1 BREAST CANCER, STAGE 1, ESTROGEN RECEPTOR NEGATIVE, LEFT (HCC): Primary | ICD-10-CM

## 2021-07-16 DIAGNOSIS — C50.912 BREAST CANCER, STAGE 1, ESTROGEN RECEPTOR NEGATIVE, LEFT (HCC): Primary | ICD-10-CM

## 2021-07-16 PROCEDURE — 96375 TX/PRO/DX INJ NEW DRUG ADDON: CPT

## 2021-07-16 PROCEDURE — 77030012965 HC NDL HUBR BBMI -A

## 2021-07-16 PROCEDURE — 96413 CHEMO IV INFUSION 1 HR: CPT

## 2021-07-16 PROCEDURE — 96415 CHEMO IV INFUSION ADDL HR: CPT

## 2021-07-16 PROCEDURE — 96377 APPLICATON ON-BODY INJECTOR: CPT

## 2021-07-16 PROCEDURE — 74011250636 HC RX REV CODE- 250/636: Performed by: INTERNAL MEDICINE

## 2021-07-16 RX ORDER — PALONOSETRON 0.05 MG/ML
0.25 INJECTION, SOLUTION INTRAVENOUS ONCE
Status: COMPLETED | OUTPATIENT
Start: 2021-07-16 | End: 2021-07-16

## 2021-07-16 RX ORDER — HEPARIN 100 UNIT/ML
300-500 SYRINGE INTRAVENOUS AS NEEDED
Status: DISPENSED | OUTPATIENT
Start: 2021-07-16 | End: 2021-07-16

## 2021-07-16 RX ORDER — DEXAMETHASONE SODIUM PHOSPHATE 4 MG/ML
10 INJECTION, SOLUTION INTRA-ARTICULAR; INTRALESIONAL; INTRAMUSCULAR; INTRAVENOUS; SOFT TISSUE ONCE
Status: COMPLETED | OUTPATIENT
Start: 2021-07-16 | End: 2021-07-16

## 2021-07-16 RX ORDER — SODIUM CHLORIDE 0.9 % (FLUSH) 0.9 %
10 SYRINGE (ML) INJECTION AS NEEDED
Status: DISPENSED | OUTPATIENT
Start: 2021-07-16 | End: 2021-07-16

## 2021-07-16 RX ORDER — SODIUM CHLORIDE 9 MG/ML
10 INJECTION INTRAMUSCULAR; INTRAVENOUS; SUBCUTANEOUS AS NEEDED
Status: ACTIVE | OUTPATIENT
Start: 2021-07-16 | End: 2021-07-16

## 2021-07-16 RX ORDER — SODIUM CHLORIDE 9 MG/ML
25 INJECTION, SOLUTION INTRAVENOUS CONTINUOUS
Status: DISPENSED | OUTPATIENT
Start: 2021-07-16 | End: 2021-07-16

## 2021-07-16 RX ADMIN — CYCLOPHOSPHAMIDE 1000 MG: 1 INJECTION, POWDER, FOR SOLUTION INTRAVENOUS; ORAL at 11:36

## 2021-07-16 RX ADMIN — PALONOSETRON HYDROCHLORIDE 0.25 MG: 0.25 INJECTION, SOLUTION INTRAVENOUS at 09:40

## 2021-07-16 RX ADMIN — PEGFILGRASTIM 6 MG: KIT SUBCUTANEOUS at 13:55

## 2021-07-16 RX ADMIN — DEXAMETHASONE SODIUM PHOSPHATE 10 MG: 4 INJECTION INTRA-ARTICULAR; INTRALESIONAL; INTRAMUSCULAR; INTRAVENOUS; SOFT TISSUE at 09:30

## 2021-07-16 RX ADMIN — DOCETAXEL ANHYDROUS 120 MG: 10 INJECTION, SOLUTION INTRAVENOUS at 10:25

## 2021-07-16 RX ADMIN — SODIUM CHLORIDE 25 ML/HR: 9 INJECTION, SOLUTION INTRAVENOUS at 09:18

## 2021-07-16 RX ADMIN — HEPARIN 500 UNITS: 100 SYRINGE at 12:25

## 2021-07-16 RX ADMIN — SODIUM CHLORIDE 500 ML: 9 INJECTION, SOLUTION INTRAVENOUS at 09:18

## 2021-07-16 RX ADMIN — Medication 10 ML: at 12:23

## 2021-07-16 NOTE — PROGRESS NOTES
[facility] Progress Note    Date: 2021    Name: Yaneth Cardozo              MRN: [id number]              : 1955    Chemotherapy Cycle: Taxotere/Cytoxan/Neulasta onbody injection      Pt to [facility], ambulatory, at 0848 accompanied by herself. Ms. Irma Ulrich was assessed and education was provided. Ms. Daija Aguiar vitals were reviewed. Visit Vitals  /70 (BP 1 Location: Right arm, BP Patient Position: Sitting)   Pulse 75   Temp 97.4 °F (36.3 °C)   Resp 16   SpO2 98%   Breastfeeding No       Right upper chest mediport accessed with 20 g 3/4 inch silva needle. Port flushed easily and had brisk blood return. NS initiated @ 25 ml/hr. Lab results were obtained and reviewed. No results found for this or any previous visit (from the past 12 hour(s)). Lab results within ordered parameters to give chemo today. ANC = 1.9, PLT = 262. Chemo dosages verified with today's BSA and found to be within 10% of ordered dosages. Pre-medications (Decadron 4 mg, Aloxi 0.25) were administered as ordered and chemotherapy was initiated after blood return from port re-verified. Reviewed expected side effects of premeds with patient. Taxotere 120 mg to infuse over 1 hour. VS stable at end of infusion and pt denied complaints. Line flushed with NS and blood return from port re-verified. Cytoxan 1000 mg was infuse over 30 minutes. VS stable at end of infusion and pt denied complaints. Line flushed with NS and blood return from port re-verified. Neulasta On-body 6 mg applied as ordered. Pt educated and verbalized understanding. Ms. Irma Ulrich tolerated infusion, and had no complaints at this time. Mediport flushed with NS 20 ml and Heparin 500 units then de-accessed. No irritation or bleeding noted. Bandaid applied. Patient armband removed and shredded. Ms. Irma Ulrich was discharged from [place] [address] in stable condition at 1240.  She is to return on at 8/3/2021 at 1100 for her next chemotherapy appointment.     Mir Alexander RN  July 16, 2021  3:05 PM

## 2021-07-20 DIAGNOSIS — E78.5 HYPERLIPIDEMIA, UNSPECIFIED HYPERLIPIDEMIA TYPE: ICD-10-CM

## 2021-07-20 NOTE — TELEPHONE ENCOUNTER
Received faxed refill request from [facility]. Last visit was 4/22/21, next appt 7/26/21. Last lipids on 5/14/21 and last comp on 6/28/21. Requested Prescriptions     Pending Prescriptions Disp Refills    fenofibrate nanocrystallized (TRICOR) 145 mg tablet 90 Tablet 1     Sig: Take 1 Tablet by mouth daily.

## 2021-07-22 RX ORDER — FENOFIBRATE 145 MG/1
145 TABLET, COATED ORAL DAILY
Qty: 90 TABLET | Refills: 1 | Status: SHIPPED | OUTPATIENT
Start: 2021-07-22 | End: 2022-01-06

## 2021-07-23 ENCOUNTER — TELEPHONE (OUTPATIENT)
Dept: FAMILY MEDICINE CLINIC | Age: 66
End: 2021-07-23

## 2021-07-23 DIAGNOSIS — I10 ESSENTIAL HYPERTENSION: ICD-10-CM

## 2021-07-23 DIAGNOSIS — E78.5 HYPERLIPIDEMIA, UNSPECIFIED HYPERLIPIDEMIA TYPE: ICD-10-CM

## 2021-07-23 DIAGNOSIS — R73.01 IFG (IMPAIRED FASTING GLUCOSE): Primary | ICD-10-CM

## 2021-07-23 NOTE — TELEPHONE ENCOUNTER
I see the orders in the encounter (I ordered them at the time of her visit) but cannot see them for some reason in the other section where I can see all the labs that have been ordered (those that are completed and those that are not). I have no idea why those orders disappeared but I have put them in again.

## 2021-07-24 ENCOUNTER — HOSPITAL ENCOUNTER (OUTPATIENT)
Dept: LAB | Age: 66
Discharge: HOME | End: 2021-07-24
Payer: MEDICARE

## 2021-07-24 DIAGNOSIS — I10 ESSENTIAL HYPERTENSION: ICD-10-CM

## 2021-07-24 DIAGNOSIS — E78.5 HYPERLIPIDEMIA, UNSPECIFIED HYPERLIPIDEMIA TYPE: ICD-10-CM

## 2021-07-24 DIAGNOSIS — R73.01 IFG (IMPAIRED FASTING GLUCOSE): ICD-10-CM

## 2021-07-24 LAB
ALBUMIN SERPL-MCNC: 3.5 G/DL (ref 3.4–5)
ALBUMIN/GLOB SERPL: 1.1 {RATIO} (ref 0.8–1.7)
ALP SERPL-CCNC: 126 U/L (ref 45–117)
ALT SERPL-CCNC: 26 U/L (ref 13–56)
ANION GAP SERPL CALC-SCNC: 6 MMOL/L (ref 3–18)
AST SERPL-CCNC: 27 U/L (ref 10–38)
BILIRUB SERPL-MCNC: 0.3 MG/DL (ref 0.2–1)
BUN SERPL-MCNC: 13 MG/DL (ref 7–18)
BUN/CREAT SERPL: 14 (ref 12–20)
CALCIUM SERPL-MCNC: 9.3 MG/DL (ref 8.5–10.1)
CHLORIDE SERPL-SCNC: 109 MMOL/L (ref 100–111)
CHOLEST SERPL-MCNC: 123 MG/DL
CO2 SERPL-SCNC: 28 MMOL/L (ref 21–32)
CREAT SERPL-MCNC: 0.94 MG/DL (ref 0.6–1.3)
EST. AVERAGE GLUCOSE BLD GHB EST-MCNC: 128 MG/DL
GLOBULIN SER CALC-MCNC: 3.1 G/DL (ref 2–4)
GLUCOSE SERPL-MCNC: 88 MG/DL (ref 74–99)
HBA1C MFR BLD: 6.1 % (ref 4.2–5.6)
HDLC SERPL-MCNC: 12 MG/DL (ref 40–60)
HDLC SERPL: 10.3 {RATIO} (ref 0–5)
LDLC SERPL CALC-MCNC: 57 MG/DL (ref 0–100)
LIPID PROFILE,FLP: ABNORMAL
POTASSIUM SERPL-SCNC: 4.2 MMOL/L (ref 3.5–5.5)
PROT SERPL-MCNC: 6.6 G/DL (ref 6.4–8.2)
SODIUM SERPL-SCNC: 143 MMOL/L (ref 136–145)
TRIGL SERPL-MCNC: 270 MG/DL (ref ?–150)
VLDLC SERPL CALC-MCNC: 54 MG/DL

## 2021-07-24 PROCEDURE — 80061 LIPID PANEL: CPT

## 2021-07-24 PROCEDURE — 83036 HEMOGLOBIN GLYCOSYLATED A1C: CPT

## 2021-07-24 PROCEDURE — 36415 COLL VENOUS BLD VENIPUNCTURE: CPT

## 2021-07-24 PROCEDURE — 80053 COMPREHEN METABOLIC PANEL: CPT

## 2021-07-26 ENCOUNTER — VIRTUAL VISIT (OUTPATIENT)
Dept: FAMILY MEDICINE CLINIC | Age: 66
End: 2021-07-26
Payer: MEDICARE

## 2021-07-26 DIAGNOSIS — C50.912 BREAST CANCER, STAGE 1, ESTROGEN RECEPTOR NEGATIVE, LEFT (HCC): ICD-10-CM

## 2021-07-26 DIAGNOSIS — I10 ESSENTIAL HYPERTENSION: ICD-10-CM

## 2021-07-26 DIAGNOSIS — R73.01 IFG (IMPAIRED FASTING GLUCOSE): Primary | ICD-10-CM

## 2021-07-26 DIAGNOSIS — Z17.1 BREAST CANCER, STAGE 1, ESTROGEN RECEPTOR NEGATIVE, LEFT (HCC): ICD-10-CM

## 2021-07-26 DIAGNOSIS — E78.5 HYPERLIPIDEMIA, UNSPECIFIED HYPERLIPIDEMIA TYPE: ICD-10-CM

## 2021-07-26 PROBLEM — N81.3 COMPLETE UTERINE PROLAPSE: Status: ACTIVE | Noted: 2021-07-26

## 2021-07-26 PROBLEM — Z90.79 ACQUIRED ABSENCE OF GENITAL ORGAN: Status: ACTIVE | Noted: 2021-07-26

## 2021-07-26 PROBLEM — K64.9 HEMORRHOIDS: Status: ACTIVE | Noted: 2021-07-26

## 2021-07-26 PROBLEM — N81.6 HERNIATION OF RECTUM INTO VAGINA: Status: ACTIVE | Noted: 2021-07-26

## 2021-07-26 PROBLEM — D49.2 NEOPLASM OF SKIN OF FACE: Status: ACTIVE | Noted: 2021-07-26

## 2021-07-26 PROCEDURE — 1090F PRES/ABSN URINE INCON ASSESS: CPT | Performed by: FAMILY MEDICINE

## 2021-07-26 PROCEDURE — G8432 DEP SCR NOT DOC, RNG: HCPCS | Performed by: FAMILY MEDICINE

## 2021-07-26 PROCEDURE — G9899 SCRN MAM PERF RSLTS DOC: HCPCS | Performed by: FAMILY MEDICINE

## 2021-07-26 PROCEDURE — 1101F PT FALLS ASSESS-DOCD LE1/YR: CPT | Performed by: FAMILY MEDICINE

## 2021-07-26 PROCEDURE — 99214 OFFICE O/P EST MOD 30 MIN: CPT | Performed by: FAMILY MEDICINE

## 2021-07-26 PROCEDURE — G8399 PT W/DXA RESULTS DOCUMENT: HCPCS | Performed by: FAMILY MEDICINE

## 2021-07-26 PROCEDURE — G0463 HOSPITAL OUTPT CLINIC VISIT: HCPCS | Performed by: FAMILY MEDICINE

## 2021-07-26 PROCEDURE — G8756 NO BP MEASURE DOC: HCPCS | Performed by: FAMILY MEDICINE

## 2021-07-26 PROCEDURE — G8427 DOCREV CUR MEDS BY ELIG CLIN: HCPCS | Performed by: FAMILY MEDICINE

## 2021-07-26 PROCEDURE — 3017F COLORECTAL CA SCREEN DOC REV: CPT | Performed by: FAMILY MEDICINE

## 2021-07-26 RX ORDER — ASCORBIC ACID 500 MG
TABLET ORAL
COMMUNITY

## 2021-07-26 RX ORDER — PREDNISOLONE ACETATE 10 MG/ML
SUSPENSION/ DROPS OPHTHALMIC
COMMUNITY
Start: 2021-02-04 | End: 2022-02-04 | Stop reason: ALTCHOICE

## 2021-07-26 NOTE — PROGRESS NOTES
Leticia Denise is a 76 y.o. female who was seen by synchronous (real-time) audio-video technology on 7/26/2021 for Blood sugar problem, Cholesterol Problem, and Labs (Completed 7/24/21)        Assessment & Plan:   Diagnoses and all orders for this visit:    1. IFG (impaired fasting glucose)  -     METABOLIC PANEL, COMPREHENSIVE; Future  -     LIPID PANEL; Future  -     HEMOGLOBIN A1C WITH EAG; Future    2. Hyperlipidemia, unspecified hyperlipidemia type  -     METABOLIC PANEL, COMPREHENSIVE; Future  -     LIPID PANEL; Future  Reassess after cancer treatment completed. 3. Essential hypertension  -     METABOLIC PANEL, COMPREHENSIVE; Future  -     LIPID PANEL; Future    4. Breast cancer, stage 1, estrogen receptor negative, left [facility])  Care as per oncology team    The complexity of medical decision making for this visit is moderate   Follow-up and Dispositions    · Return in about 3 months (around 10/26/2021) for elevated fasting blood sugar, high cholesterol, lab review. 712  Subjective:   Patient contacted via [email or web address]. Pt has been dx with breast cancer since her last appt. She had bx and a port placement with Dr. Adenike Jimenez. She was referred to Dr. Juan Miles and Dr. Radha Blanchard and has seen both of them. Pt decided to try to gain wt out of concern that chemo would make her lose weight. She has been eating ice cream, cheese, eggs, etc.  She is still trying to walk in the mornings. Pt's  recently had covid. Pt did not have the covid vaccination; she had planned to take it this summer but that was prior to her current health issues. She now thinks she will do it after completing her cancer tx. Prior to Admission medications    Medication Sig Start Date End Date Taking? Authorizing Provider   prednisoLONE acetate (PRED FORTE) 1 % ophthalmic suspension  2/4/21  Yes Provider, Historical   zinc 50 mg tab tablet Take  by mouth daily.    Yes Provider, Historical   ascorbic acid, vitamin C, (Vitamin C) 500 mg tablet Take  by mouth. Yes Provider, Historical   fenofibrate nanocrystallized (TRICOR) 145 mg tablet Take 1 Tablet by mouth daily. 7/22/21  Yes Yovani Dewey MD   ondansetron (ZOFRAN ODT) 8 mg disintegrating tablet Take 1 Tablet by mouth every eight (8) hours as needed for Nausea or Vomiting. Indications: nausea and vomiting caused by cancer drugs 7/13/21  Yes Mamta Avila NP   lidocaine-prilocaine (EMLA) topical cream Apply  to affected area as needed (30-60 minutes before port is to be accessed). 7/13/21  Yes Mamta Avila NP   dexAMETHasone (DECADRON) 4 mg tablet Take 8 mg by mouth two (2) times daily (with meals). Please take 8 mg twice daily for three days, starting one day prior to docetaxel administration, (the day before treatment, day of treatment and day after) 7/13/21  Yes Mamta Avila NP   calcium citrate-vitamin d3 (CITRACAL+D) 315 mg-5 mcg (200 unit) tab Take 1 Tablet by mouth daily (with breakfast). Yes Provider, Historical   pilocarpine (PILOCAR) 1 % ophthalmic solution Administer 1 Drop to both eyes nightly. Yes Provider, Historical   telmisartan (MICARDIS) 20 mg tablet Take 1 Tab by mouth daily. 5/12/21  Yes Chester Verdugo MD   coenzyme q10 (CO Q-10) 10 mg cap Take  by mouth. Yes Provider, Historical   multivitamin (ONE A DAY) tablet Take 1 Tab by mouth daily. Yes Provider, Historical   aspirin 81 mg tablet Take 81 mg by mouth.      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  ascorbic acid, vitamin C, (Vitamin C) 500 mg tablet Take  by mouth.  fenofibrate nanocrystallized (TRICOR) 145 mg tablet Take 1 Tablet by mouth daily. 90 Tablet 1    ondansetron (ZOFRAN ODT) 8 mg disintegrating tablet Take 1 Tablet by mouth every eight (8) hours as needed for Nausea or Vomiting. Indications: nausea and vomiting caused by cancer drugs 30 Tablet 0    lidocaine-prilocaine (EMLA) topical cream Apply  to affected area as needed (30-60 minutes before port is to be accessed). 30 g 1    dexAMETHasone (DECADRON) 4 mg tablet Take 8 mg by mouth two (2) times daily (with meals). Please take 8 mg twice daily for three days, starting one day prior to docetaxel administration, (the day before treatment, day of treatment and day after) 36 Tablet 0    calcium citrate-vitamin d3 (CITRACAL+D) 315 mg-5 mcg (200 unit) tab Take 1 Tablet by mouth daily (with breakfast).  pilocarpine (PILOCAR) 1 % ophthalmic solution Administer 1 Drop to both eyes nightly.  telmisartan (MICARDIS) 20 mg tablet Take 1 Tab by mouth daily. 90 Tab 4    coenzyme q10 (CO Q-10) 10 mg cap Take  by mouth.  multivitamin (ONE A DAY) tablet Take 1 Tab by mouth daily.  aspirin 81 mg tablet Take 81 mg by mouth.          No Known Allergies  Past Medical History:   Diagnosis Date    High cholesterol     Hypertension     Sleep apnea     patient denies     Past Surgical History:   Procedure Laterality Date    HX BLADDER SUSPENSION      HX BREAST BIOPSY Left 6/10/2021    TAG LOCALIZED LUMPECTOMY LEFT BREAST WITH LEFT AXILLARY SENTINEL LYMPH NODE BIOPSY (TAG 6.7.21 HBV 1300; SLNB 6.9.21 [facility] performed by Jazmyn Mckeon MD at [address] HX GYN      hysterectomy    HX HEENT Bilateral 05/13/2021    Bilateral Laser surgery: Anatomical narrow angle, bilateral    HX OOPHORECTOMY Left     [place] BREAST SURGERY PROCEDURE UNLISTED  2000    right lumpectomy     Family History   Problem Relation Age of Onset    Dementia Mother     Hypertension Mother     High Cholesterol Mother     Other Mother         pre-dm    No Known Problems Son     No Known Problems Son     Other Father         homicide    Other Sister         AIDS    No Known Problems Brother     COPD Sister     No Known Problems Sister      Social History     Tobacco Use    Smoking status: Never Smoker    Smokeless tobacco: Never Used   Substance Use Topics    Alcohol use: Yes     Comment: rare       Review of Systems   Constitutional: Positive for malaise/fatigue. HENT: Negative. Respiratory: Negative. Cardiovascular: Negative. All other systems reviewed and are negative. Objective:     Patient-Reported Vitals 1/20/2021   Patient-Reported Pulse 83   Patient-Reported Systolic  597   Patient-Reported Diastolic 75      General: alert, cooperative, no distress   Mental  status: normal mood, behavior, speech, dress, motor activity, and thought processes, able to follow commands   HENT: NCAT   Neck: no visualized mass   Resp: no respiratory distress   Neuro: no gross deficits   Skin: no discoloration or lesions of concern on visible areas   Psychiatric: normal affect, consistent with stated mood, no evidence of hallucinations     Additional exam findings: none      We discussed the expected course, resolution and complications of the diagnosis(es) in detail. Medication risks, benefits, costs, interactions, and alternatives were discussed as indicated. I advised her to contact the office if her condition worsens, changes or fails to improve as anticipated. She expressed understanding with the diagnosis(es) and plan. Mercedes Mondragon, was evaluated through a synchronous (real-time) audio-video encounter. The patient (or guardian if applicable) is aware that this is a billable service. Verbal consent to proceed has been obtained within the past 12 months. The visit was conducted pursuant to the emergency declaration under the [address] [place] [address] and the [employer] Act. Patient identification was verified, and a caregiver was present when appropriate. The patient was located in a state where the provider was credentialed to provide care.     Aubrey Barrios MD

## 2021-07-26 NOTE — PROGRESS NOTES
Juan Manuel Bowers presents today for   Chief Complaint   Patient presents with    Blood sugar problem    Cholesterol Problem    Labs     Completed 7/24/21       Virtual/telephone visit    Depression Screening:  3 most recent PHQ Screens 7/13/2021   Little interest or pleasure in doing things Not at all   Feeling down, depressed, irritable, or hopeless Not at all   Total Score PHQ 2 0       Learning Assessment:  Learning Assessment 1/9/2020   PRIMARY LEARNER Patient   HIGHEST LEVEL OF EDUCATION - PRIMARY LEARNER  SOME COLLEGE   BARRIERS PRIMARY LEARNER NONE   CO-LEARNER CAREGIVER No   PRIMARY LANGUAGE ENGLISH   LEARNER PREFERENCE PRIMARY LISTENING   ANSWERED BY self   RELATIONSHIP SELF       Fall Risk  Fall Risk Assessment, last 12 mths 7/13/2021   Able to walk? Yes   Fall in past 12 months? -   Do you feel unsteady? -   Are you worried about falling -       Travel Screening:   Travel Screening      No screening recorded since 07/25/21 0000      Travel History   Travel since 06/26/21     No documented travel since 06/26/21          Health Maintenance reviewed and discussed and ordered per Provider. Health Maintenance Due   Topic Date Due    COVID-19 Vaccine (1) Never done    Medicare Yearly Exam  Never done    Shingrix Vaccine Age 50> (2 of 2) 06/22/2021   . Coordination of Care:  1. Have you been to the ER, urgent care clinic since your last visit? Hospitalized since your last visit? Ongoing Ca tx currently. 2. Have you seen or consulted any other health care providers outside of the [address] [place] [address] since your last visit? Include any pap smears or colon screening.  No

## 2021-07-30 RX ORDER — ONDANSETRON 2 MG/ML
8 INJECTION INTRAMUSCULAR; INTRAVENOUS AS NEEDED
Status: CANCELLED | OUTPATIENT
Start: 2021-08-06

## 2021-07-30 RX ORDER — SODIUM CHLORIDE 9 MG/ML
10 INJECTION INTRAMUSCULAR; INTRAVENOUS; SUBCUTANEOUS AS NEEDED
Status: CANCELLED | OUTPATIENT
Start: 2021-08-06

## 2021-07-30 RX ORDER — ACETAMINOPHEN 325 MG/1
650 TABLET ORAL AS NEEDED
Status: CANCELLED
Start: 2021-08-06

## 2021-07-30 RX ORDER — EPINEPHRINE 1 MG/ML
0.3 INJECTION, SOLUTION, CONCENTRATE INTRAVENOUS AS NEEDED
Status: CANCELLED | OUTPATIENT
Start: 2021-08-06

## 2021-07-30 RX ORDER — HYDROCORTISONE SODIUM SUCCINATE 100 MG/2ML
100 INJECTION, POWDER, FOR SOLUTION INTRAMUSCULAR; INTRAVENOUS AS NEEDED
Status: CANCELLED | OUTPATIENT
Start: 2021-08-06

## 2021-07-30 RX ORDER — DIPHENHYDRAMINE HYDROCHLORIDE 50 MG/ML
25 INJECTION, SOLUTION INTRAMUSCULAR; INTRAVENOUS AS NEEDED
Status: CANCELLED
Start: 2021-08-06

## 2021-07-30 RX ORDER — DIPHENHYDRAMINE HYDROCHLORIDE 50 MG/ML
50 INJECTION, SOLUTION INTRAMUSCULAR; INTRAVENOUS AS NEEDED
Status: CANCELLED
Start: 2021-08-06

## 2021-07-30 RX ORDER — ALBUTEROL SULFATE 0.83 MG/ML
2.5 SOLUTION RESPIRATORY (INHALATION) AS NEEDED
Status: CANCELLED
Start: 2021-08-06

## 2021-08-01 NOTE — PROGRESS NOTES
Hematology/Oncology Note      Date: 8/3/2021    Name: Ariel Cuello  : 1955        Brock Bernard MD       Oncology History  Ms. Marcos Adams is a most pleasant 76y.o. year old female who was seen for consultation of triple negative left breast cancer. -- The patient has a past medical history significant of hypertension, hypercholesterolemia, obstructive sleep apnea. -- Postmenopausal,  2, Para 2. Menarche at 14. First birth at 24 and menopause at 63.  -- 21 Screening mammogram reported possible posterior lateral left breast mass. -- 21 Diagnostic mammogram reported left breast oval, heterogeneously hypoechoic mass with microlobulated margins at the 3:30 position, 8 cfn, measuring 0.8 x 0.7 x 0.3 cm.  -- 21 Biopsy reported invasive ductal carcinoma, grade 3, ER/MI negative, HER2 negative. -- 21 MRI breast reported left breast mass at 3:30, 0.7 cm malignant mass (correlating to biopsied mass). -- 06/10/21 S.p Tag localized lumpectomy left breast with left sentinel lymph node biopsy with ultrasound-guided right internal jugular Mediport placement. Lumpectomy with SLNBx reported invasive ductal adenocarcinoma, grade 3, 5 mm, 0/2 lymph nodes involved, and negative margins (9 mm posterior). pT1bN0  -- Given her high graded histology and TNBC we have suggested adjuvantly chemotherapy. For those patients with node-negative, T <1 cm, we have recommended non-anthracycline-based regimens to avoid the risks associated with anthracyclines. -- 2021 C1 D1 Adjuvant TC      Subjective:     Chief complaint: Triple negative left breast cancer    History of Present Illness:   She has started first cycle of adjuvant chemotherapy. The patient was accompanied by her family during this visit. She reported fatigue and low energy level after therapy which improving today. She has some hair loss. The patient otherwise has no other complaints.  Denied fever, chills, night sweat, unintentional weight loss, skin lumps or bumps, acute bleeding or bruising issues. Denied headache, acute vision change, dizziness, chest pain, worsen shortness of breath, palpitation, productive cough, nausea, vomiting, abdominal pain, altered bowel habits, dysuria, worsen bone pain or back pain, new focal numbness or weakness.          Past Medical History, Family History, and Social History:    Past Medical History:   Diagnosis Date    High cholesterol     Hypertension     Sleep apnea     patient denies     Past Surgical History:   Procedure Laterality Date    HX BLADDER SUSPENSION      HX BREAST BIOPSY Left 6/10/2021    TAG LOCALIZED LUMPECTOMY LEFT BREAST WITH LEFT AXILLARY SENTINEL LYMPH NODE BIOPSY (TAG 6.7.21 HBV 1300; SLNB 6.9.21 [facility]) performed by Portia Lind MD at [address] [place] [address] HX GYN      hysterectomy    HX HEENT Bilateral 05/13/2021    Bilateral Laser surgery: Anatomical narrow angle, bilateral    HX OOPHORECTOMY Left     [place] BREAST SURGERY PROCEDURE UNLISTED  2000    right lumpectomy     Social History     Socioeconomic History    Marital status:      Spouse name: Not on file    Number of children: Not on file    Years of education: Not on file    Highest education level: Not on file   Occupational History    Not on file   Tobacco Use    Smoking status: Never Smoker    Smokeless tobacco: Never Used   Vaping Use    Vaping Use: Never used   Substance and Sexual Activity    Alcohol use: Yes     Comment: rare    Drug use: Never    Sexual activity: Not Currently     Partners: Male     Birth control/protection: None   Other Topics Concern    Not on file   Social History Narrative    Not on file     Social Determinants of Health     Financial Resource Strain:     Difficulty of Paying Living Expenses:    Food Insecurity:     Worried About Running Out of Food in the Last Year:     Mamadou of Food in the Last Year:    Transportation Needs:     Lack of Transportation (Medical):  Lack of Transportation (Non-Medical):    Physical Activity:     Days of Exercise per Week:     Minutes of Exercise per Session:    Stress:     Feeling of Stress :    Social Connections:     Frequency of Communication with Friends and Family:     Frequency of Social Gatherings with Friends and Family:     Attends Mu-ism Services:     Active Member of Clubs or Organizations:     Attends Club or Organization Meetings:     Marital Status:    Intimate Partner Violence:     Fear of Current or Ex-Partner:     Emotionally Abused:     Physically Abused:     Sexually Abused:      Family History   Problem Relation Age of Onset    Dementia Mother     Hypertension Mother     High Cholesterol Mother     Other Mother         pre-dm    No Known Problems Son     No Known Problems Son     Other Father         homicide    Other Sister         AIDS    No Known Problems Brother     COPD Sister     No Known Problems Sister      Current Outpatient Medications   Medication Sig Dispense Refill    prednisoLONE acetate (PRED FORTE) 1 % ophthalmic suspension       zinc 50 mg tab tablet Take  by mouth daily.  ascorbic acid, vitamin C, (Vitamin C) 500 mg tablet Take  by mouth.  fenofibrate nanocrystallized (TRICOR) 145 mg tablet Take 1 Tablet by mouth daily. 90 Tablet 1    ondansetron (ZOFRAN ODT) 8 mg disintegrating tablet Take 1 Tablet by mouth every eight (8) hours as needed for Nausea or Vomiting. Indications: nausea and vomiting caused by cancer drugs 30 Tablet 0    lidocaine-prilocaine (EMLA) topical cream Apply  to affected area as needed (30-60 minutes before port is to be accessed). 30 g 1    dexAMETHasone (DECADRON) 4 mg tablet Take 8 mg by mouth two (2) times daily (with meals).  Please take 8 mg twice daily for three days, starting one day prior to docetaxel administration, (the day before treatment, day of treatment and day after) 36 Tablet 0    calcium citrate-vitamin d3 (CITRACAL+D) 315 mg-5 mcg (200 unit) tab Take 1 Tablet by mouth daily (with breakfast).  pilocarpine (PILOCAR) 1 % ophthalmic solution Administer 1 Drop to both eyes nightly.  telmisartan (MICARDIS) 20 mg tablet Take 1 Tab by mouth daily. 90 Tab 4    coenzyme q10 (CO Q-10) 10 mg cap Take  by mouth.  multivitamin (ONE A DAY) tablet Take 1 Tab by mouth daily.  aspirin 81 mg tablet Take 81 mg by mouth. Review of Systems   Constitutional: Negative for chills, diaphoresis, fever, malaise/fatigue and weight loss. Respiratory: Negative for cough, hemoptysis, shortness of breath and wheezing. Cardiovascular: Negative for chest pain, palpitations and leg swelling. Gastrointestinal: Negative for abdominal pain, diarrhea, heartburn, nausea and vomiting. Genitourinary: Negative for dysuria, frequency, hematuria and urgency. Musculoskeletal: Negative for joint pain and myalgias. Skin: Negative for itching and rash. Neurological: Negative for dizziness, seizures, weakness and headaches. Psychiatric/Behavioral: Negative for depression. The patient does not have insomnia. Objective:     Visit Vitals  /72   Pulse 76   Ht 5' 3\" (1.6 m)   Wt 59.4 kg (131 lb)   SpO2 97%   BMI 23.21 kg/m²       ECOG Performance Status (grade): 0   0 - able to carry on all pre-disease activity w/out restriction  1 - restricted but able to carry out light work  2 - ambulatory and can self- care but unable to carry out work  3 - bed or chair >50% of waking hours  4 - completely disable, total care, confined to bed or chair    Physical Exam  Constitutional:       Appearance: Normal appearance. HENT:      Head: Normocephalic and atraumatic. Eyes:      Pupils: Pupils are equal, round, and reactive to light. Cardiovascular:      Rate and Rhythm: Normal rate and regular rhythm. Heart sounds: Normal heart sounds.    Pulmonary:      Effort: Pulmonary effort is normal.      Breath sounds: Normal breath sounds. Abdominal:      General: Bowel sounds are normal.      Palpations: Abdomen is soft. Tenderness: There is no abdominal tenderness. There is no guarding. Musculoskeletal:         General: Normal range of motion. Cervical back: Neck supple. Right lower leg: No edema. Left lower leg: No edema. Skin:     General: Skin is warm. Neurological:      General: No focal deficit present. Mental Status: She is alert and oriented to person, place, and time. Mental status is at baseline. Diagnostics:      No results found for this or any previous visit (from the past 96 hour(s)). Imaging:  No results found for this or any previous visit. Results for orders placed during the hospital encounter of 06/10/21    XR CHEST PORT    Narrative  ONE VIEW CHEST RADIOGRAPH    INDICATION: post op. Status post left breast lumpectomy and sentinel lymph node  biopsy with right Mediport placement. COMPARISON: Chest radiograph 5/14/2021. TECHNIQUE: AP view of the chest    FINDINGS:    LINES/TUBES: Right chest wall Mediport tip is in the SVC. MEDIASTINUM: Cardiac silhouette is within normal limits. LUNGS: Low lung volumes. No pneumothorax. Minimal left basilar atelectasis. No  evidence for pneumonia. No pleural effusion. BONES/OTHER: No acute osseous abnormality. Surgical staples and subcutaneous air  left breast and axilla consistent with recent surgery. Impression  Right chest wall Mediport tip in the SVC. No pneumothorax. Results for orders placed during the hospital encounter of 12/23/12    CT SINUSES WO CONT    Narrative  CT paranasal sinuses    CPT CODE: 67351    HISTORY: Headache and malaise    COMPARISON: None. TECHNIQUE:  Volumetric axial scans of the paranasal sinuses were performed. Sagittal and coronal reformations were performed to improve demonstration of  orbital meatal complexes and nasal drainage passageways.     FINDINGS:    The nasal septum demonstrates mild right-to-left deviation. The bilateral nasal  cavities appear patent. The right inferior turbinate is hypertrophied. The  paranasal sinuses appear patent including bilateral frontal, ethmoidal,  sphenoidal and maxillary sinuses. The right middle turbinate is enlarged and  aerated. The bilateral osteomeatal complexes appear patent. No evidence of  soft tissue density, mucosal thickening or air fluid level identified. Visualized portion of facial bones and both orbits appear unremarkable. Impression  :    No evidence for sinusitis. Hypertrophy inferior nasal turbinate - allergic rhinitis. Pathology  5/4/2021     LEFT BREAST, 3:30, 8 CM FROM NIPPLE, BIOPSY:   INVASIVE DUCTAL CARCINOMA. INVASIVE CARCINOMA OF THE BREAST: Biopsy   SPECIMEN   Procedure: Needle biopsy   Specimen Laterality: Left   TUMOR   Tumor Site: Clock position - 3 o'clock   Histologic Type: Invasive carcinoma of no special type (ductal)   Glandular (Acinar) / Tubular Differentiation: Score 2   Nuclear Pleomorphism: Score 3   Mitotic Rate: Score 3   Overall Grade: Grade 3 (scores of 8 or 9)   Tumor Size: 6 mm   Ductal Carcinoma In Situ (DCIS): Not identified   Lymphovascular Invasion: Not identified   Microcalcifications: Not identified     ER/SD Results:   Estrogen Receptor (ER): Negative (0%, Internal controls present and stain as expected). Progesterone Receptor (PgR): Negative (0%, Internal controls present and stain as expected). Her-2 (IHC Method): Negative (score 1+)               Assessment:                                        1. Breast cancer, stage 1, estrogen receptor negative, left ([facility] [address]    2.  [facility]ple [facility]        Plan:                                        #  Left breast cancer, triple negative invasive ductal adenocarcinoma   -- Diagnosed 6/24/2021 (Active) Stage IB, T1b, pN0, M0, G3, HER2 Neg, ER Neg, SD N  -- Left triple negative invasive ductal adenocarcinoma s/p lumpectomy with SLNBx (pT1bN0, grade 3, 0/2 lymph nodes involved, negative margins)  -- 03/23/21 Screening mammogram reported possible posterior lateral left breast mass. -- 04/27/21 Diagnostic mammogram reported left breast oval, heterogeneously hypoechoic mass  with microlobulated margins at the 3:30 position, 8 cfn, measuring 0.8 x 0.7 x 0.3 cm.  -- 05/04/21 Biopsy reported invasive ductal carcinoma, grade 3, ER/MT negative, HER2 negative. -- 05/25/21 MRI breast reported left breast mass at 3:30, 0.7 cm malignant mass (correlating to biopsied mass). -- 06/10/21 S.p Tag localized lumpectomy left breast with left sentinel lymph node biopsy with ultrasound-guided right internal jugular Mediport placement. Lumpectomy with SLNBx reported invasive ductal adenocarcinoma, grade 3, 5 mm, 0/2 lymph nodes involved, and negative margins (9 mm posterior). pT1bN0  -- Given her high graded histology and TNBC we have suggested adjuvantly chemotherapy. For those patients with node-negative, T <1 cm, we have recommended non-anthracycline-based regimens to avoid the risks associated with anthracyclines. -- 7/16/2021 C1 D1 Adjuvant TC  The patient has tolerated first round of chemotherapy without high-grade toxicities. Recent lab reviewed    Plan:  --She will continue C2 D1 docetaxel cyclophosphamide as scheduled  -- Monitor CBC, CMP, CA 27-29, CA 15-3  --The patient will f/u with RadOnc for adjuvant XRT  --The patient will RTC in 3 weeks for follow-up, always sooner as needed. .       TC  Day 1: Docetaxel 75mg/m2 IV over 60 minutes  Day 1: Cyclophosphamide 600mg/m2 IV over 30 minutes. Repeat cycle every 21 days for 4 cycles      No orders of the defined types were placed in this encounter. Ms. King Hoang has a reminder for a \"due or due soon\" health maintenance. I have asked that she contact her primary care provider for follow-up on this health maintenance.    All of patient's questions answered to their apparent satisfaction. They verbally show understanding and agreement with aforementioned plan. Doron Calderon MD  8/3/2021        Parts of this document has been produced using Dragon dictation system. Unrecognized errors in transcription may be present. Please do not hesitate to reach out for any questions or clarifications. CC: Yovani Dewey MD; Keith Toscano M.D.; Anjali Leung M.D.

## 2021-08-03 ENCOUNTER — OFFICE VISIT (OUTPATIENT)
Dept: ONCOLOGY | Age: 66
End: 2021-08-03
Payer: MEDICARE

## 2021-08-03 ENCOUNTER — HOSPITAL ENCOUNTER (OUTPATIENT)
Dept: INFUSION THERAPY | Age: 66
Discharge: HOME | End: 2021-08-03
Payer: MEDICARE

## 2021-08-03 VITALS
BODY MASS INDEX: 23.21 KG/M2 | OXYGEN SATURATION: 97 % | HEART RATE: 76 BPM | SYSTOLIC BLOOD PRESSURE: 133 MMHG | WEIGHT: 131 LBS | HEIGHT: 63 IN | DIASTOLIC BLOOD PRESSURE: 72 MMHG

## 2021-08-03 DIAGNOSIS — C50.912 BREAST CANCER, STAGE 1, ESTROGEN RECEPTOR NEGATIVE, LEFT (HCC): Primary | ICD-10-CM

## 2021-08-03 DIAGNOSIS — C50.919 TRIPLE NEGATIVE MALIGNANT NEOPLASM OF BREAST (HCC): ICD-10-CM

## 2021-08-03 DIAGNOSIS — Z17.1 BREAST CANCER, STAGE 1, ESTROGEN RECEPTOR NEGATIVE, LEFT (HCC): Primary | ICD-10-CM

## 2021-08-03 LAB
ALBUMIN SERPL-MCNC: 4 G/DL (ref 3.4–5)
ALBUMIN/GLOB SERPL: 1.3 {RATIO} (ref 0.8–1.7)
ALP SERPL-CCNC: 98 U/L (ref 45–117)
ALT SERPL-CCNC: 25 U/L (ref 13–56)
ANION GAP SERPL CALC-SCNC: 3 MMOL/L (ref 3–18)
AST SERPL-CCNC: 19 U/L (ref 10–38)
BASOPHILS # BLD: 0.1 K/UL (ref 0–0.1)
BASOPHILS NFR BLD: 1 % (ref 0–2)
BILIRUB SERPL-MCNC: 0.4 MG/DL (ref 0.2–1)
BUN SERPL-MCNC: 17 MG/DL (ref 7–18)
BUN/CREAT SERPL: 21 (ref 12–20)
CALCIUM SERPL-MCNC: 9.9 MG/DL (ref 8.5–10.1)
CHLORIDE SERPL-SCNC: 110 MMOL/L (ref 100–111)
CO2 SERPL-SCNC: 28 MMOL/L (ref 21–32)
CREAT SERPL-MCNC: 0.82 MG/DL (ref 0.6–1.3)
DIFFERENTIAL METHOD BLD: ABNORMAL
EOSINOPHIL # BLD: 0 K/UL (ref 0–0.4)
EOSINOPHIL NFR BLD: 0 % (ref 0–5)
ERYTHROCYTE [DISTWIDTH] IN BLOOD BY AUTOMATED COUNT: 13.8 % (ref 11.6–14.5)
GLOBULIN SER CALC-MCNC: 3.1 G/DL (ref 2–4)
GLUCOSE SERPL-MCNC: 79 MG/DL (ref 74–99)
HCT VFR BLD AUTO: 34.8 % (ref 35–45)
HGB BLD-MCNC: 11.8 G/DL (ref 12–16)
LYMPHOCYTES # BLD: 1.8 K/UL (ref 0.9–3.6)
LYMPHOCYTES NFR BLD: 21 % (ref 21–52)
MCH RBC QN AUTO: 33.9 PG (ref 24–34)
MCHC RBC AUTO-ENTMCNC: 33.9 G/DL (ref 31–37)
MCV RBC AUTO: 100 FL (ref 74–97)
MONOCYTES # BLD: 1.1 K/UL (ref 0.05–1.2)
MONOCYTES NFR BLD: 13 % (ref 3–10)
NEUTS SEG # BLD: 5.5 K/UL (ref 1.8–8)
NEUTS SEG NFR BLD: 63 % (ref 40–73)
PLATELET # BLD AUTO: 372 K/UL (ref 135–420)
PMV BLD AUTO: 9.5 FL (ref 9.2–11.8)
POTASSIUM SERPL-SCNC: 4.3 MMOL/L (ref 3.5–5.5)
PROT SERPL-MCNC: 7.1 G/DL (ref 6.4–8.2)
RBC # BLD AUTO: 3.48 M/UL (ref 4.2–5.3)
SODIUM SERPL-SCNC: 141 MMOL/L (ref 136–145)
WBC # BLD AUTO: 8.6 K/UL (ref 4.6–13.2)

## 2021-08-03 PROCEDURE — G0463 HOSPITAL OUTPT CLINIC VISIT: HCPCS | Performed by: INTERNAL MEDICINE

## 2021-08-03 PROCEDURE — 36415 COLL VENOUS BLD VENIPUNCTURE: CPT

## 2021-08-03 PROCEDURE — 85025 COMPLETE CBC W/AUTO DIFF WBC: CPT

## 2021-08-03 PROCEDURE — 99214 OFFICE O/P EST MOD 30 MIN: CPT | Performed by: INTERNAL MEDICINE

## 2021-08-03 PROCEDURE — 80053 COMPREHEN METABOLIC PANEL: CPT

## 2021-08-03 NOTE — PROGRESS NOTES
[facility] Progress Note    Date: August 3, 2021    Name: Mary Beth Pelayo    MRN: [id number]         : 1955    Pre chemo labs    Ms. Brenda Waddell arrived to [facility] at [phone] for peripheral lab draw. Pt just came from Dr. Rubina Oleary office. Blood drawn for labs via LEFT antecubital venipuncture x1 attempt using 23g butterfly collection set, gauze and coban applied to site. Specimen sent to lab for cbc w/diff and CMP. Ms. Brenda Waddell tolerated well without complaints. Ms. Brenda Waddell was discharged from [place] [address] in stable condition at 1150.     Sultana Connell RN  August 3, 2021

## 2021-08-06 ENCOUNTER — HOSPITAL ENCOUNTER (OUTPATIENT)
Dept: INFUSION THERAPY | Age: 66
Discharge: HOME | End: 2021-08-06
Payer: MEDICARE

## 2021-08-06 VITALS
DIASTOLIC BLOOD PRESSURE: 62 MMHG | HEART RATE: 67 BPM | BODY MASS INDEX: 23.14 KG/M2 | OXYGEN SATURATION: 99 % | SYSTOLIC BLOOD PRESSURE: 109 MMHG | WEIGHT: 130.6 LBS | RESPIRATION RATE: 16 BRPM | TEMPERATURE: 97.5 F | HEIGHT: 63 IN

## 2021-08-06 DIAGNOSIS — C50.912 BREAST CANCER, STAGE 1, ESTROGEN RECEPTOR NEGATIVE, LEFT (HCC): Primary | ICD-10-CM

## 2021-08-06 DIAGNOSIS — Z17.1 BREAST CANCER, STAGE 1, ESTROGEN RECEPTOR NEGATIVE, LEFT (HCC): Primary | ICD-10-CM

## 2021-08-06 PROCEDURE — 74011250636 HC RX REV CODE- 250/636: Performed by: INTERNAL MEDICINE

## 2021-08-06 PROCEDURE — 96413 CHEMO IV INFUSION 1 HR: CPT

## 2021-08-06 PROCEDURE — 96361 HYDRATE IV INFUSION ADD-ON: CPT

## 2021-08-06 PROCEDURE — 96375 TX/PRO/DX INJ NEW DRUG ADDON: CPT

## 2021-08-06 PROCEDURE — 96377 APPLICATON ON-BODY INJECTOR: CPT

## 2021-08-06 PROCEDURE — 96417 CHEMO IV INFUS EACH ADDL SEQ: CPT

## 2021-08-06 PROCEDURE — 77030012965 HC NDL HUBR BBMI -A

## 2021-08-06 RX ORDER — SODIUM CHLORIDE 0.9 % (FLUSH) 0.9 %
10 SYRINGE (ML) INJECTION AS NEEDED
Status: DISPENSED | OUTPATIENT
Start: 2021-08-06 | End: 2021-08-06

## 2021-08-06 RX ORDER — HEPARIN 100 UNIT/ML
300-500 SYRINGE INTRAVENOUS AS NEEDED
Status: DISPENSED | OUTPATIENT
Start: 2021-08-06 | End: 2021-08-06

## 2021-08-06 RX ORDER — SODIUM CHLORIDE 9 MG/ML
25 INJECTION, SOLUTION INTRAVENOUS CONTINUOUS
Status: DISPENSED | OUTPATIENT
Start: 2021-08-06 | End: 2021-08-06

## 2021-08-06 RX ORDER — DEXAMETHASONE SODIUM PHOSPHATE 4 MG/ML
10 INJECTION, SOLUTION INTRA-ARTICULAR; INTRALESIONAL; INTRAMUSCULAR; INTRAVENOUS; SOFT TISSUE ONCE
Status: COMPLETED | OUTPATIENT
Start: 2021-08-06 | End: 2021-08-06

## 2021-08-06 RX ORDER — PALONOSETRON 0.05 MG/ML
0.25 INJECTION, SOLUTION INTRAVENOUS ONCE
Status: COMPLETED | OUTPATIENT
Start: 2021-08-06 | End: 2021-08-06

## 2021-08-06 RX ADMIN — Medication 20 ML: at 12:50

## 2021-08-06 RX ADMIN — DEXAMETHASONE SODIUM PHOSPHATE 10 MG: 4 INJECTION INTRA-ARTICULAR; INTRALESIONAL; INTRAMUSCULAR; INTRAVENOUS; SOFT TISSUE at 09:30

## 2021-08-06 RX ADMIN — DOCETAXEL ANHYDROUS 120 MG: 10 INJECTION, SOLUTION INTRAVENOUS at 10:30

## 2021-08-06 RX ADMIN — CYCLOPHOSPHAMIDE 1000 MG: 200 INJECTION, SOLUTION INTRAVENOUS at 11:55

## 2021-08-06 RX ADMIN — PEGFILGRASTIM 6 MG: KIT SUBCUTANEOUS at 12:45

## 2021-08-06 RX ADMIN — SODIUM CHLORIDE 25 ML/HR: 9 INJECTION, SOLUTION INTRAVENOUS at 09:25

## 2021-08-06 RX ADMIN — HEPARIN 500 UNITS: 100 SYRINGE at 12:50

## 2021-08-06 RX ADMIN — PALONOSETRON 0.25 MG: 0.05 INJECTION, SOLUTION INTRAVENOUS at 09:38

## 2021-08-06 RX ADMIN — Medication 20 ML: at 09:15

## 2021-08-06 RX ADMIN — SODIUM CHLORIDE 500 ML: 9 INJECTION, SOLUTION INTRAVENOUS at 09:40

## 2021-08-06 NOTE — PROGRESS NOTES
[facility] Progress Note    Date: 2021    Name: Zaid Huang    MRN: [id number]         : 1955    Ms. Halima Umanzor arrived in the [facility] today at 0900, in stable condition, here for Cycle 2, IV TAXOTERE + CYTOXAN Chemotherapy Regimen (Every 21 Day Cycle). She was assessed and education was provided. Ms. Jayshree Hurd vitals were reviewed. Visit Vitals  /70 (BP 1 Location: Right upper arm, BP Patient Position: Sitting)   Pulse 81   Temp 97.1 °F (36.2 °C)   Resp 16   Ht 5' 3\" (1.6 m)   Wt 59.2 kg (130 lb 9.6 oz)   SpO2 99%   Breastfeeding No   BMI 23.13 kg/m²         Current Chemotherapy Consent was noted in her electronic record. Ms. Halima Umanzor stated that she DID take her oral Decadron yesterday & today, as prescribed. Lab results were reviewed. Her pre-chemo labs obtained on 8-3-21 were all reviewed, and were all noted to be satisfactory for treatment today, and were as follows:        Results for Noma Denver (MRN [id number])    Ref. Range 8/3/2021 11:47   WBC Latest Ref Range: 4.6 - 13.2 K/uL 8.6   RBC Latest Ref Range: 4.20 - 5.30 M/uL 3.48 (L)   HGB Latest Ref Range: 12.0 - 16.0 g/dL 11.8 (L)   HCT Latest Ref Range: 35.0 - 45.0 % 34.8 (L)   MCV Latest Ref Range: 74.0 - 97.0 .0 (H)   MCH Latest Ref Range: 24.0 - 34.0 PG 33.9   MCHC Latest Ref Range: 31.0 - 37.0 g/dL 33.9   RDW Latest Ref Range: 11.6 - 14.5 % 13.8   PLATELET Latest Ref Range: 135 - 420 K/uL 372   MPV Latest Ref Range: 9.2 - 11.8 FL 9.5   NEUTROPHILS Latest Ref Range: 40 - 73 % 63   LYMPHOCYTES Latest Ref Range: 21 - 52 % 21   MONOCYTES Latest Ref Range: 3 - 10 % 13 (H)   EOSINOPHILS Latest Ref Range: 0 - 5 % 0   BASOPHILS Latest Ref Range: 0 - 2 % 1   DF Latest Units:   AUTOMATED   ABS. NEUTROPHILS Latest Ref Range: 1.8 - 8.0 K/UL 5.5   ABS. LYMPHOCYTES Latest Ref Range: 0.9 - 3.6 K/UL 1.8   ABS. MONOCYTES Latest Ref Range: 0.05 - 1.2 K/UL 1.1   ABS. EOSINOPHILS Latest Ref Range: 0.0 - 0.4 K/UL 0.0   ABS.  BASOPHILS Latest Ref Range: 0.0 - 0.1 K/UL 0.1   Sodium Latest Ref Range: 136 - 145 mmol/L 141   Potassium Latest Ref Range: 3.5 - 5.5 mmol/L 4.3   Chloride Latest Ref Range: 100 - 111 mmol/L 110   CO2 Latest Ref Range: 21 - 32 mmol/L 28   Anion gap Latest Ref Range: 3.0 - 18 mmol/L 3   Glucose Latest Ref Range: 74 - 99 mg/dL 79   BUN Latest Ref Range: 7.0 - 18 MG/DL 17   Creatinine Latest Ref Range: 0.6 - 1.3 MG/DL 0.82   BUN/Creatinine ratio Latest Ref Range: 12 - 20   21 (H)   Calcium Latest Ref Range: 8.5 - 10.1 MG/DL 9.9   GFR est non-AA Latest Ref Range: >60 ml/min/1.73m2 >60   GFR est AA Latest Ref Range: >60 ml/min/1.73m2 >60   Bilirubin, total Latest Ref Range: 0.2 - 1.0 MG/DL 0.4   Protein, total Latest Ref Range: 6.4 - 8.2 g/dL 7.1   Albumin Latest Ref Range: 3.4 - 5.0 g/dL 4.0   Globulin Latest Ref Range: 2.0 - 4.0 g/dL 3.1   A-G Ratio Latest Ref Range: 0.8 - 1.7   1.3   ALT Latest Ref Range: 13 - 56 U/L 25   AST Latest Ref Range: 10 - 38 U/L 19   Alk. phosphatase Latest Ref Range: 45 - 117 U/L 98           The outer/lateral lumen of her right chest double lumen port was accessed without incident, and brisk blood return was obtained.  ml IV Bag was initiated to infuse @ KVO PRN throughout treatment today. The following pre-medications were administered as ordered: DECADRON 10 mg IV, & ALOXI 0.25 mg IV.  ml IV Bolus was administered pre-chemotherapy per order and without incident. The following chemotherapy medications were administered per order:    Docetaxel (TAXOTERE) 120 mg IV (75 mg/m2) was administered over approximately 1 hour, without incident. Cyclophosphamide (CYTOXAN) 1,000 mg IV (600 mg/m2) was administered over approximately 30 minutes, without incident. After completion of all ordered IV medications, her port was flushed well per policy and without incident with NS & Heparin, and then the silva needle was removed and gauze/coban was applied. NEULASTA 6 mg SQ On Body Injector was applied to the back of her right arm at 1245 without incident. And, after application, the green light was noted to be flashing appropriately. Ms. Christopher Colin tolerated treatment very well today, and voiced no complaints. Ms. Christopher Colin was discharged from [place] [address] in stable condition at [phone]. She is to return on Wednesday, 8-25-21 at 1030, for her next appointment, for pre-chemo labs (office visit with Debora Vasquez @ 0930). And then, Cycle 3, TAXOTERE/CYTOXAN Chemotherapy is scheduled in 3 weeks, on Friday, 8-27-21 at 0900.     Jose Dodson RN  August 6, 2021  10:00 AM

## 2021-08-20 RX ORDER — HYDROCORTISONE SODIUM SUCCINATE 100 MG/2ML
100 INJECTION, POWDER, FOR SOLUTION INTRAMUSCULAR; INTRAVENOUS AS NEEDED
Status: CANCELLED | OUTPATIENT
Start: 2021-08-27

## 2021-08-20 RX ORDER — ONDANSETRON 2 MG/ML
8 INJECTION INTRAMUSCULAR; INTRAVENOUS AS NEEDED
Status: CANCELLED | OUTPATIENT
Start: 2021-08-27

## 2021-08-20 RX ORDER — ACETAMINOPHEN 325 MG/1
650 TABLET ORAL AS NEEDED
Status: CANCELLED
Start: 2021-08-27

## 2021-08-20 RX ORDER — DIPHENHYDRAMINE HYDROCHLORIDE 50 MG/ML
50 INJECTION, SOLUTION INTRAMUSCULAR; INTRAVENOUS AS NEEDED
Status: CANCELLED
Start: 2021-08-27

## 2021-08-20 RX ORDER — ALBUTEROL SULFATE 0.83 MG/ML
2.5 SOLUTION RESPIRATORY (INHALATION) AS NEEDED
Status: CANCELLED
Start: 2021-08-27

## 2021-08-20 RX ORDER — DIPHENHYDRAMINE HYDROCHLORIDE 50 MG/ML
25 INJECTION, SOLUTION INTRAMUSCULAR; INTRAVENOUS AS NEEDED
Status: CANCELLED
Start: 2021-08-27

## 2021-08-20 RX ORDER — EPINEPHRINE 1 MG/ML
0.3 INJECTION, SOLUTION, CONCENTRATE INTRAVENOUS AS NEEDED
Status: CANCELLED | OUTPATIENT
Start: 2021-08-27

## 2021-08-23 NOTE — PROGRESS NOTES
Hematology/Oncology Note      Date: 2021    Name: Sivakumar Mercado  : 1955        Sylvia Kothari MD       Oncology History  Ms. Marcus Regalado is a most pleasant 76y.o. year old female who was seen for consultation of triple negative left breast cancer. -- The patient has a past medical history significant of hypertension, hypercholesterolemia, obstructive sleep apnea. -- Postmenopausal,  2, Para 2. Menarche at 14. First birth at 24 and menopause at 63.  -- 21 Screening mammogram reported possible posterior lateral left breast mass. -- 21 Diagnostic mammogram reported left breast oval, heterogeneously hypoechoic mass with microlobulated margins at the 3:30 position, 8 cfn, measuring 0.8 x 0.7 x 0.3 cm.  -- 21 Biopsy reported invasive ductal carcinoma, grade 3, ER/GA negative, HER2 negative. -- 21 MRI breast reported left breast mass at 3:30, 0.7 cm malignant mass (correlating to biopsied mass). -- 06/10/21 S.p Tag localized lumpectomy left breast with left sentinel lymph node biopsy with ultrasound-guided right internal jugular Mediport placement. Lumpectomy with SLNBx reported invasive ductal adenocarcinoma, grade 3, 5 mm, 0/2 lymph nodes involved, and negative margins (9 mm posterior). pT1bN0  -- Given her high graded histology and TNBC we have suggested adjuvantly chemotherapy. For those patients with node-negative, T <1 cm, we have recommended non-anthracycline-based regimens to avoid the risks associated with anthracyclines. -- 2021 C1 D1 Adjuvant TC      Subjective:     Chief complaint: Triple negative left breast cancer    History of Present Illness:   She has started first cycle of adjuvant chemotherapy. The patient was accompanied by her family during this visit. She reported tolerated therapy fairly well. She has some hair loss. The patient otherwise has no other complaints.  She denied any fever, chills, night sweat, unintentional weight loss, skin lumps or bumps, acute bleeding or bruising issues. Denied headache, acute vision change, dizziness, chest pain, worsen shortness of breath, palpitation, productive cough, nausea, vomiting, abdominal pain, altered bowel habits, dysuria, worsen bone pain or back pain, new focal numbness or weakness. Past Medical History, Family History, and Social History:    Past Medical History:   Diagnosis Date    High cholesterol     Hypertension     Sleep apnea     patient denies     Past Surgical History:   Procedure Laterality Date    HX BLADDER SUSPENSION      HX BREAST BIOPSY Left 6/10/2021    TAG LOCALIZED LUMPECTOMY LEFT BREAST WITH LEFT AXILLARY SENTINEL LYMPH NODE BIOPSY (TAG 6.7.21 [facility] 1300; SLNB 6.9.21 [facility] 1030) performed by Richi Burris MD at [address] [place] [address] HX GYN      hysterectomy    HX HEENT Bilateral 05/13/2021    Bilateral Laser surgery: Anatomical narrow angle, bilateral    HX OOPHORECTOMY Left     NC BREAST SURGERY PROCEDURE UNLISTED  2000    right lumpectomy     Social History     Socioeconomic History    Marital status:      Spouse name: Not on file    Number of children: Not on file    Years of education: Not on file    Highest education level: Not on file   Occupational History    Not on file   Tobacco Use    Smoking status: Never Smoker    Smokeless tobacco: Never Used   Vaping Use    Vaping Use: Never used   Substance and Sexual Activity    Alcohol use: Yes     Comment: rare    Drug use: Never    Sexual activity: Not Currently     Partners: Male     Birth control/protection: None   Other Topics Concern    Not on file   Social History Narrative    Not on file     Social Determinants of Health     Financial Resource Strain:     Difficulty of Paying Living Expenses:    Food Insecurity:     Worried About Running Out of Food in the Last Year:     Ran Out of Food in the Last Year:    Transportation Needs:     Lack of Transportation (Medical):      Lack of Transportation (Non-Medical):    Physical Activity:     Days of Exercise per Week:     Minutes of Exercise per Session:    Stress:     Feeling of Stress :    Social Connections:     Frequency of Communication with Friends and Family:     Frequency of Social Gatherings with Friends and Family:     Attends [employer] Services:     Active Member of Clubs or Organizations:     Attends Club or Organization Meetings:     Marital Status:    Intimate Partner Violence:     Fear of Current or Ex-Partner:     Emotionally Abused:     Physically Abused:     Sexually Abused:      Family History   Problem Relation Age of Onset    Dementia Mother     Hypertension Mother     High Cholesterol Mother     Other Mother         pre-dm    No Known Problems Son     No Known Problems Son     Other Father         homicide    Other Sister         AIDS    No Known Problems Brother     COPD Sister     No Known Problems Sister      Current Outpatient Medications   Medication Sig Dispense Refill    dexAMETHasone (DECADRON) 4 mg tablet Take 8 mg by mouth two (2) times daily (with meals). Please take 8 mg twice daily for three days, starting one day prior to docetaxel administration, (the day before treatment, day of treatment and day after) 36 Tablet 0    prednisoLONE acetate (PRED FORTE) 1 % ophthalmic suspension       zinc 50 mg tab tablet Take  by mouth daily.  ascorbic acid, vitamin C, (Vitamin C) 500 mg tablet Take  by mouth.  fenofibrate nanocrystallized (TRICOR) 145 mg tablet Take 1 Tablet by mouth daily. 90 Tablet 1    ondansetron (ZOFRAN ODT) 8 mg disintegrating tablet Take 1 Tablet by mouth every eight (8) hours as needed for Nausea or Vomiting. Indications: nausea and vomiting caused by cancer drugs 30 Tablet 0    lidocaine-prilocaine (EMLA) topical cream Apply  to affected area as needed (30-60 minutes before port is to be accessed).  30 g 1    calcium citrate-vitamin d3 (CITRACAL+D) 315 mg-5 mcg (200 unit) tab Take 1 Tablet by mouth daily (with breakfast).  pilocarpine (PILOCAR) 1 % ophthalmic solution Administer 1 Drop to both eyes nightly.  telmisartan (MICARDIS) 20 mg tablet Take 1 Tab by mouth daily. 90 Tab 4    coenzyme q10 (CO Q-10) 10 mg cap Take  by mouth.  multivitamin (ONE A DAY) tablet Take 1 Tab by mouth daily.  aspirin 81 mg tablet Take 81 mg by mouth. Review of Systems   Constitutional: Negative for chills, diaphoresis, fever, malaise/fatigue and weight loss. Respiratory: Negative for cough, hemoptysis, shortness of breath and wheezing. Cardiovascular: Negative for chest pain, palpitations and leg swelling. Gastrointestinal: Negative for abdominal pain, diarrhea, heartburn, nausea and vomiting. Genitourinary: Negative for dysuria, frequency, hematuria and urgency. Musculoskeletal: Negative for joint pain and myalgias. Skin: Negative for itching and rash. Neurological: Negative for dizziness, seizures, weakness and headaches. Psychiatric/Behavioral: Negative for depression. The patient does not have insomnia. Objective:     Visit Vitals  /67   Pulse 88   Temp 97.4 °F (36.3 °C)   Ht 5' 3\" (1.6 m)   Wt 59.3 kg (130 lb 12.8 oz)   SpO2 98%   BMI 23.17 kg/m²       ECOG Performance Status (grade): 0   0 - able to carry on all pre-disease activity w/out restriction  1 - restricted but able to carry out light work  2 - ambulatory and can self- care but unable to carry out work  3 - bed or chair >50% of waking hours  4 - completely disable, total care, confined to bed or chair    Physical Exam  Constitutional:       Appearance: Normal appearance. HENT:      Head: Normocephalic and atraumatic. Eyes:      Pupils: Pupils are equal, round, and reactive to light. Cardiovascular:      Rate and Rhythm: Normal rate and regular rhythm. Heart sounds: Normal heart sounds.    Pulmonary:      Effort: Pulmonary effort is normal.      Breath sounds: Normal breath sounds. Abdominal:      General: Bowel sounds are normal.      Palpations: Abdomen is soft. Tenderness: There is no abdominal tenderness. There is no guarding. Musculoskeletal:         General: Normal range of motion. Cervical back: Neck supple. Right lower leg: No edema. Left lower leg: No edema. Skin:     General: Skin is warm. Neurological:      General: No focal deficit present. Mental Status: She is alert and oriented to person, place, and time. Mental status is at baseline. Diagnostics:      Recent Results (from the past 96 hour(s))   CBC WITH AUTOMATED DIFF    Collection Time: 08/25/21 10:18 AM   Result Value Ref Range    WBC 5.8 4.6 - 13.2 K/uL    RBC 3.63 (L) 4.20 - 5.30 M/uL    HGB 11.2 (L) 12.0 - 16.0 g/dL    HCT 35.3 35.0 - 45.0 %    MCV 97.2 78.0 - 100.0 FL    MCH 30.9 24.0 - 34.0 PG    MCHC 31.7 31.0 - 37.0 g/dL    RDW 15.3 (H) 11.6 - 14.5 %    PLATELET 699 904 - 889 K/uL    MPV 8.9 (L) 9.2 - 11.8 FL    NEUTROPHILS 55 40 - 73 %    LYMPHOCYTES 27 21 - 52 %    MONOCYTES 17 (H) 3 - 10 %    EOSINOPHILS 0 0 - 5 %    BASOPHILS 1 0 - 2 %    ABS. NEUTROPHILS 3.2 1.8 - 8.0 K/UL    ABS. LYMPHOCYTES 1.6 0.9 - 3.6 K/UL    ABS. MONOCYTES 1.0 0.05 - 1.2 K/UL    ABS. EOSINOPHILS 0.0 0.0 - 0.4 K/UL    ABS. BASOPHILS 0.1 0.0 - 0.1 K/UL    DF AUTOMATED     METABOLIC PANEL, COMPREHENSIVE    Collection Time: 08/25/21 10:18 AM   Result Value Ref Range    Sodium 141 136 - 145 mmol/L    Potassium 4.8 3.5 - 5.5 mmol/L    Chloride 108 100 - 111 mmol/L    CO2 27 21 - 32 mmol/L    Anion gap 6 3.0 - 18 mmol/L    Glucose 90 74 - 99 mg/dL    BUN 19 (H) 7.0 - 18 MG/DL    Creatinine 0.92 0.6 - 1.3 MG/DL    BUN/Creatinine ratio 21 (H) 12 - 20      GFR est AA >60 >60 ml/min/1.73m2    GFR est non-AA >60 >60 ml/min/1.73m2    Calcium 9.8 8.5 - 10.1 MG/DL    Bilirubin, total 0.3 0.2 - 1.0 MG/DL    ALT (SGPT) 26 13 - 56 U/L    AST (SGOT) 23 10 - 38 U/L    Alk.  phosphatase 78 45 - 117 U/L    Protein, total 6.6 6.4 - 8.2 g/dL    Albumin 3.8 3.4 - 5.0 g/dL    Globulin 2.8 2.0 - 4.0 g/dL    A-G Ratio 1.4 0.8 - 1.7         Imaging:  No results found for this or any previous visit. Results for orders placed during the hospital encounter of 06/10/21    XR CHEST PORT    Narrative  ONE VIEW CHEST RADIOGRAPH    INDICATION: post op. Status post left breast lumpectomy and sentinel lymph node  biopsy with right Mediport placement. COMPARISON: Chest radiograph 5/14/2021. TECHNIQUE: AP view of the chest    FINDINGS:    LINES/TUBES: Right chest wall Mediport tip is in the SVC. MEDIASTINUM: Cardiac silhouette is within normal limits. LUNGS: Low lung volumes. No pneumothorax. Minimal left basilar atelectasis. No  evidence for pneumonia. No pleural effusion. BONES/OTHER: No acute osseous abnormality. Surgical staples and subcutaneous air  left breast and axilla consistent with recent surgery. Impression  Right chest wall Mediport tip in the SVC. No pneumothorax. Results for orders placed during the hospital encounter of 12/23/12    CT SINUSES WO CONT    Narrative  CT paranasal sinuses    CPT CODE: 57189    HISTORY: Headache and malaise    COMPARISON: None. TECHNIQUE:  Volumetric axial scans of the paranasal sinuses were performed. Sagittal and coronal reformations were performed to improve demonstration of  orbital meatal complexes and nasal drainage passageways. FINDINGS:    The nasal septum demonstrates mild right-to-left deviation. The bilateral nasal  cavities appear patent. The right inferior turbinate is hypertrophied. The  paranasal sinuses appear patent including bilateral frontal, ethmoidal,  sphenoidal and maxillary sinuses. The right middle turbinate is enlarged and  aerated. The bilateral osteomeatal complexes appear patent. No evidence of  soft tissue density, mucosal thickening or air fluid level identified.   Visualized portion of facial bones and both orbits appear unremarkable. Impression  :    No evidence for sinusitis. Hypertrophy inferior nasal turbinate - allergic rhinitis. Pathology  5/4/2021     LEFT BREAST, 3:30, 8 CM FROM NIPPLE, BIOPSY:   INVASIVE DUCTAL CARCINOMA. INVASIVE CARCINOMA OF THE BREAST: Biopsy   SPECIMEN   Procedure: Needle biopsy   Specimen Laterality: Left   TUMOR   Tumor Site: Clock position - 3 o'clock   Histologic Type: Invasive carcinoma of no special type (ductal)   Glandular (Acinar) / Tubular Differentiation: Score 2   Nuclear Pleomorphism: Score 3   Mitotic Rate: Score 3   Overall Grade: Grade 3 (scores of 8 or 9)   Tumor Size: 6 mm   Ductal Carcinoma In Situ (DCIS): Not identified   Lymphovascular Invasion: Not identified   Microcalcifications: Not identified     ER/NV Results:   Estrogen Receptor (ER): Negative (0%, Internal controls present and stain as expected). Progesterone Receptor (PgR): Negative (0%, Internal controls present and stain as expected). Her-2 (IHC Method): Negative (score 1+)               Assessment:                                        1. Breast cancer, stage 1, estrogen receptor negative, left ([facility] [address]    2. Triple negative malignant neoplasm of breast ([facility] [address]    3. Normocytic anemia        Plan:                                        Left breast cancer, triple negative invasive ductal adenocarcinoma   -- Diagnosed 6/24/2021 (Active) Stage IB, T1b, pN0, M0, G3, HER2 Neg, ER Neg, NV N  -- Left triple negative invasive ductal adenocarcinoma s/p lumpectomy with SLNBx (pT1bN0, grade 3, 0/2 lymph nodes involved, negative margins)  -- 03/23/21 Screening mammogram reported possible posterior lateral left breast mass. -- 04/27/21 Diagnostic mammogram reported left breast oval, heterogeneously hypoechoic mass  with microlobulated margins at the 3:30 position, 8 cfn, measuring 0.8 x 0.7 x 0.3 cm.  -- 05/04/21 Biopsy reported invasive ductal carcinoma, grade 3, ER/NV negative, HER2 negative.   -- 05/25/21 MRI breast reported left breast mass at 3:30, 0.7 cm malignant mass (correlating to biopsied mass). -- 06/10/21 S.p Tag localized lumpectomy left breast with left sentinel lymph node biopsy with ultrasound-guided right internal jugular Mediport placement. Lumpectomy with SLNBx reported invasive ductal adenocarcinoma, grade 3, 5 mm, 0/2 lymph nodes involved, and negative margins (9 mm posterior). pT1bN0  -- Given her high graded histology and TNBC we have suggested adjuvantly chemotherapy. For those patients with node-negative, T <1 cm, we have recommended non-anthracycline-based regimens to avoid the risks associated with anthracyclines. -- 7/16/2021 C1 D1 Adjuvant TC  -- 8/6/2021 C2 D1   The patient has tolerated chemotherapy without high-grade toxicities. Recent lab reviewed with the patient. Plan:  --She will continue C3 D1 docetaxel cyclophosphamide as scheduled, 8/27 tentatively  -- Lab check CBC, CMP. Pending prechemo labs today. --The patient will f/u with Rad-Onc after completes chenotherapy for adjuvant XRT  --The patient will RTC in 3 weeks for follow-up, always sooner as needed. .       TC  Day 1: Docetaxel 75mg/m2 IV over 60 minutes  Day 1: Cyclophosphamide 600mg/m2 IV over 30 minutes. Repeat cycle every 21 days for 4 cycles      Normocytic Anemia  -- 8/3/2021 CBC reported hemoglobin 11.8, hematocrit 34.8%  -- Likely chemotherapy related  -- continue lab check CBC, CMP    Orders Placed This Encounter    dexAMETHasone (DECADRON) 4 mg tablet     Sig: Take 8 mg by mouth two (2) times daily (with meals). Please take 8 mg twice daily for three days, starting one day prior to docetaxel administration, (the day before treatment, day of treatment and day after)     Dispense:  36 Tablet     Refill:  0           Ms. Anup Hoang has a reminder for a \"due or due soon\" health maintenance. I have asked that she contact her primary care provider for follow-up on this health maintenance.    All of patient's questions answered to their apparent satisfaction. They verbally show understanding and agreement with aforementioned plan. Shelley Schaeffer MD  8/25/2021        Parts of this document has been produced using Dragon dictation system. Unrecognized errors in transcription may be present. Please do not hesitate to reach out for any questions or clarifications. CC: Lico Wilkins MD; Eliane Vasquez M.D.; Poonam Ferrari M.D.

## 2021-08-25 ENCOUNTER — HOSPITAL ENCOUNTER (OUTPATIENT)
Dept: INFUSION THERAPY | Age: 66
Discharge: HOME | End: 2021-08-25
Payer: MEDICARE

## 2021-08-25 ENCOUNTER — OFFICE VISIT (OUTPATIENT)
Dept: ONCOLOGY | Age: 66
End: 2021-08-25
Payer: MEDICARE

## 2021-08-25 VITALS
SYSTOLIC BLOOD PRESSURE: 111 MMHG | RESPIRATION RATE: 18 BRPM | TEMPERATURE: 97.4 F | DIASTOLIC BLOOD PRESSURE: 67 MMHG | HEART RATE: 88 BPM | OXYGEN SATURATION: 98 %

## 2021-08-25 VITALS
BODY MASS INDEX: 23.18 KG/M2 | TEMPERATURE: 97.4 F | OXYGEN SATURATION: 98 % | DIASTOLIC BLOOD PRESSURE: 67 MMHG | HEIGHT: 63 IN | SYSTOLIC BLOOD PRESSURE: 111 MMHG | WEIGHT: 130.8 LBS | HEART RATE: 88 BPM

## 2021-08-25 DIAGNOSIS — C50.912 BREAST CANCER, STAGE 1, ESTROGEN RECEPTOR NEGATIVE, LEFT (HCC): Primary | ICD-10-CM

## 2021-08-25 DIAGNOSIS — C50.919 TRIPLE NEGATIVE MALIGNANT NEOPLASM OF BREAST (HCC): ICD-10-CM

## 2021-08-25 DIAGNOSIS — Z17.1 BREAST CANCER, STAGE 1, ESTROGEN RECEPTOR NEGATIVE, LEFT (HCC): Primary | ICD-10-CM

## 2021-08-25 DIAGNOSIS — D64.9 NORMOCYTIC ANEMIA: ICD-10-CM

## 2021-08-25 LAB
ALBUMIN SERPL-MCNC: 3.8 G/DL (ref 3.4–5)
ALBUMIN/GLOB SERPL: 1.4 {RATIO} (ref 0.8–1.7)
ALP SERPL-CCNC: 78 U/L (ref 45–117)
ALT SERPL-CCNC: 26 U/L (ref 13–56)
ANION GAP SERPL CALC-SCNC: 6 MMOL/L (ref 3–18)
AST SERPL-CCNC: 23 U/L (ref 10–38)
BASOPHILS # BLD: 0.1 K/UL (ref 0–0.1)
BASOPHILS NFR BLD: 1 % (ref 0–2)
BILIRUB SERPL-MCNC: 0.3 MG/DL (ref 0.2–1)
BUN SERPL-MCNC: 19 MG/DL (ref 7–18)
BUN/CREAT SERPL: 21 (ref 12–20)
CALCIUM SERPL-MCNC: 9.8 MG/DL (ref 8.5–10.1)
CHLORIDE SERPL-SCNC: 108 MMOL/L (ref 100–111)
CO2 SERPL-SCNC: 27 MMOL/L (ref 21–32)
CREAT SERPL-MCNC: 0.92 MG/DL (ref 0.6–1.3)
DIFFERENTIAL METHOD BLD: ABNORMAL
EOSINOPHIL # BLD: 0 K/UL (ref 0–0.4)
EOSINOPHIL NFR BLD: 0 % (ref 0–5)
ERYTHROCYTE [DISTWIDTH] IN BLOOD BY AUTOMATED COUNT: 15.3 % (ref 11.6–14.5)
GLOBULIN SER CALC-MCNC: 2.8 G/DL (ref 2–4)
GLUCOSE SERPL-MCNC: 90 MG/DL (ref 74–99)
HCT VFR BLD AUTO: 35.3 % (ref 35–45)
HGB BLD-MCNC: 11.2 G/DL (ref 12–16)
LYMPHOCYTES # BLD: 1.6 K/UL (ref 0.9–3.6)
LYMPHOCYTES NFR BLD: 27 % (ref 21–52)
MCH RBC QN AUTO: 30.9 PG (ref 24–34)
MCHC RBC AUTO-ENTMCNC: 31.7 G/DL (ref 31–37)
MCV RBC AUTO: 97.2 FL (ref 78–100)
MONOCYTES # BLD: 1 K/UL (ref 0.05–1.2)
MONOCYTES NFR BLD: 17 % (ref 3–10)
NEUTS SEG # BLD: 3.2 K/UL (ref 1.8–8)
NEUTS SEG NFR BLD: 55 % (ref 40–73)
PLATELET # BLD AUTO: 417 K/UL (ref 135–420)
PMV BLD AUTO: 8.9 FL (ref 9.2–11.8)
POTASSIUM SERPL-SCNC: 4.8 MMOL/L (ref 3.5–5.5)
PROT SERPL-MCNC: 6.6 G/DL (ref 6.4–8.2)
RBC # BLD AUTO: 3.63 M/UL (ref 4.2–5.3)
SODIUM SERPL-SCNC: 141 MMOL/L (ref 136–145)
WBC # BLD AUTO: 5.8 K/UL (ref 4.6–13.2)

## 2021-08-25 PROCEDURE — 80053 COMPREHEN METABOLIC PANEL: CPT

## 2021-08-25 PROCEDURE — 85025 COMPLETE CBC W/AUTO DIFF WBC: CPT

## 2021-08-25 PROCEDURE — 99214 OFFICE O/P EST MOD 30 MIN: CPT | Performed by: INTERNAL MEDICINE

## 2021-08-25 PROCEDURE — 36415 COLL VENOUS BLD VENIPUNCTURE: CPT

## 2021-08-25 PROCEDURE — G0463 HOSPITAL OUTPT CLINIC VISIT: HCPCS | Performed by: INTERNAL MEDICINE

## 2021-08-25 RX ORDER — DEXAMETHASONE 4 MG/1
8 TABLET ORAL 2 TIMES DAILY WITH MEALS
Qty: 36 TABLET | Refills: 0 | Status: SHIPPED | OUTPATIENT
Start: 2021-08-25 | End: 2022-02-04 | Stop reason: ALTCHOICE

## 2021-08-25 NOTE — PROGRESS NOTES
[facility] Progress Note    Date: 2021    Name: Yariel Rosas    MRN: [id number]         : 1955    Pre chemo labs    Ms. Bryson Wise arrived to [facility] at 1010 for peripheral lab draw. Ms. Sarmiento Shoulders vitals were reviewed. Visit Vitals  /67   Pulse 88   Temp 97.4 °F (36.3 °C)   Resp 18   SpO2 98%       Blood drawn for labs via Right antecubital venipuncture x1 attempt using 23g butterfly collection set, gauze and coban applied to site. Specimen sent to lab for cbc w/diff and CMP. No results found for this or any previous visit (from the past 12 hour(s)). Ms. Bryson Wise tolerated well without complaints. Ms. Bryson Wise was discharged from [place] [address] in stable condition at 1020. She is to return on 10/27/21 at 0900.     David Mathew  2021

## 2021-08-27 ENCOUNTER — HOSPITAL ENCOUNTER (OUTPATIENT)
Dept: INFUSION THERAPY | Age: 66
Discharge: HOME | End: 2021-08-27
Payer: MEDICARE

## 2021-08-27 VITALS
DIASTOLIC BLOOD PRESSURE: 72 MMHG | HEART RATE: 74 BPM | RESPIRATION RATE: 18 BRPM | OXYGEN SATURATION: 98 % | SYSTOLIC BLOOD PRESSURE: 120 MMHG | TEMPERATURE: 97.2 F

## 2021-08-27 DIAGNOSIS — Z17.1 BREAST CANCER, STAGE 1, ESTROGEN RECEPTOR NEGATIVE, LEFT (HCC): Primary | ICD-10-CM

## 2021-08-27 DIAGNOSIS — C50.912 BREAST CANCER, STAGE 1, ESTROGEN RECEPTOR NEGATIVE, LEFT (HCC): Primary | ICD-10-CM

## 2021-08-27 PROCEDURE — 74011000250 HC RX REV CODE- 250: Performed by: INTERNAL MEDICINE

## 2021-08-27 PROCEDURE — 77030012965 HC NDL HUBR BBMI -A

## 2021-08-27 PROCEDURE — 96413 CHEMO IV INFUSION 1 HR: CPT

## 2021-08-27 PROCEDURE — 96417 CHEMO IV INFUS EACH ADDL SEQ: CPT

## 2021-08-27 PROCEDURE — 96375 TX/PRO/DX INJ NEW DRUG ADDON: CPT

## 2021-08-27 PROCEDURE — 96367 TX/PROPH/DG ADDL SEQ IV INF: CPT

## 2021-08-27 PROCEDURE — 74011250636 HC RX REV CODE- 250/636: Performed by: INTERNAL MEDICINE

## 2021-08-27 RX ORDER — SODIUM CHLORIDE 0.9 % (FLUSH) 0.9 %
10 SYRINGE (ML) INJECTION AS NEEDED
Status: DISPENSED | OUTPATIENT
Start: 2021-08-27 | End: 2021-08-27

## 2021-08-27 RX ORDER — SODIUM CHLORIDE 9 MG/ML
25 INJECTION, SOLUTION INTRAVENOUS CONTINUOUS
Status: DISPENSED | OUTPATIENT
Start: 2021-08-27 | End: 2021-08-27

## 2021-08-27 RX ORDER — DEXAMETHASONE SODIUM PHOSPHATE 4 MG/ML
10 INJECTION, SOLUTION INTRA-ARTICULAR; INTRALESIONAL; INTRAMUSCULAR; INTRAVENOUS; SOFT TISSUE ONCE
Status: COMPLETED | OUTPATIENT
Start: 2021-08-27 | End: 2021-08-27

## 2021-08-27 RX ORDER — PALONOSETRON 0.05 MG/ML
0.25 INJECTION, SOLUTION INTRAVENOUS ONCE
Status: COMPLETED | OUTPATIENT
Start: 2021-08-27 | End: 2021-08-27

## 2021-08-27 RX ORDER — HEPARIN 100 UNIT/ML
300-500 SYRINGE INTRAVENOUS AS NEEDED
Status: DISPENSED | OUTPATIENT
Start: 2021-08-27 | End: 2021-08-27

## 2021-08-27 RX ORDER — SODIUM CHLORIDE 9 MG/ML
10 INJECTION INTRAMUSCULAR; INTRAVENOUS; SUBCUTANEOUS AS NEEDED
Status: DISPENSED | OUTPATIENT
Start: 2021-08-27 | End: 2021-08-27

## 2021-08-27 RX ADMIN — DOCETAXEL ANHYDROUS 120 MG: 10 INJECTION, SOLUTION INTRAVENOUS at 11:22

## 2021-08-27 RX ADMIN — SODIUM CHLORIDE 25 ML/HR: 9 INJECTION, SOLUTION INTRAVENOUS at 09:45

## 2021-08-27 RX ADMIN — PEGFILGRASTIM 6 MG: KIT SUBCUTANEOUS at 13:20

## 2021-08-27 RX ADMIN — DEXAMETHASONE SODIUM PHOSPHATE 10 MG: 4 INJECTION INTRA-ARTICULAR; INTRALESIONAL; INTRAMUSCULAR; INTRAVENOUS; SOFT TISSUE at 09:57

## 2021-08-27 RX ADMIN — PALONOSETRON 0.25 MG: 0.05 INJECTION, SOLUTION INTRAVENOUS at 10:14

## 2021-08-27 RX ADMIN — Medication 10 ML: at 13:18

## 2021-08-27 RX ADMIN — CYCLOPHOSPHAMIDE 1000 MG: 200 INJECTION, SOLUTION INTRAVENOUS at 12:34

## 2021-08-27 RX ADMIN — SODIUM CHLORIDE 500 ML: 9 INJECTION, SOLUTION INTRAVENOUS at 09:50

## 2021-08-27 RX ADMIN — HEPARIN 500 UNITS: 100 SYRINGE at 13:18

## 2021-08-27 RX ADMIN — Medication 10 ML: at 13:17

## 2021-08-27 RX ADMIN — SODIUM CHLORIDE 10 ML: 9 INJECTION INTRAMUSCULAR; INTRAVENOUS; SUBCUTANEOUS at 09:57

## 2021-08-27 NOTE — PROGRESS NOTES
[facility] Progress Note    Date: 2021    Name: Francis Kapoor    MRN: [id number]         : 1955    Ms. Aminta Quiroz arrived in the [facility] today at 0915, in stable condition, here for Cycle 3, IV TAXOTERE + CYTOXAN Chemotherapy Regimen (Every 21 Day Cycle). She was assessed and education was provided. Ms. Gosia Sanchez vitals were reviewed. Visit Vitals  /67 (BP 1 Location: Left upper arm, BP Patient Position: Sitting)   Pulse 76   Temp 97.9 °F (36.6 °C)   Resp 18   SpO2 98%   Breastfeeding No     Ms. Aminta Quiroz stated that she DID take her oral Decadron yesterday & today, as prescribed. Lab results were reviewed. Her pre-chemo labs obtained on 21 were all reviewed, and were all noted to be satisfactory for treatment today, and were as follows:      Results for Zeina Corcoran (MRN [id number])    Ref. Range 2021 10:18   WBC Latest Ref Range: 4.6 - 13.2 K/uL 5.8   RBC Latest Ref Range: 4.20 - 5.30 M/uL 3.63 (L)   HGB Latest Ref Range: 12.0 - 16.0 g/dL 11.2 (L)   HCT Latest Ref Range: 35.0 - 45.0 % 35.3   MCV Latest Ref Range: 78.0 - 100.0 FL 97.2   MCH Latest Ref Range: 24.0 - 34.0 PG 30.9   MCHC Latest Ref Range: 31.0 - 37.0 g/dL 31.7   RDW Latest Ref Range: 11.6 - 14.5 % 15.3 (H)   PLATELET Latest Ref Range: 135 - 420 K/uL 417   MPV Latest Ref Range: 9.2 - 11.8 FL 8.9 (L)   NEUTROPHILS Latest Ref Range: 40 - 73 % 55   LYMPHOCYTES Latest Ref Range: 21 - 52 % 27   MONOCYTES Latest Ref Range: 3 - 10 % 17 (H)   EOSINOPHILS Latest Ref Range: 0 - 5 % 0   BASOPHILS Latest Ref Range: 0 - 2 % 1   DF Latest Units:   AUTOMATED   ABS. NEUTROPHILS Latest Ref Range: 1.8 - 8.0 K/UL 3.2   ABS. LYMPHOCYTES Latest Ref Range: 0.9 - 3.6 K/UL 1.6   ABS. MONOCYTES Latest Ref Range: 0.05 - 1.2 K/UL 1.0   ABS. EOSINOPHILS Latest Ref Range: 0.0 - 0.4 K/UL 0.0   ABS.  BASOPHILS Latest Ref Range: 0.0 - 0.1 K/UL 0.1   Sodium Latest Ref Range: 136 - 145 mmol/L 141   Potassium Latest Ref Range: 3.5 - 5.5 mmol/L 4.8   Chloride Latest Ref Range: 100 - 111 mmol/L 108   CO2 Latest Ref Range: 21 - 32 mmol/L 27   Anion gap Latest Ref Range: 3.0 - 18 mmol/L 6   Glucose Latest Ref Range: 74 - 99 mg/dL 90   BUN Latest Ref Range: 7.0 - 18 MG/DL 19 (H)   Creatinine Latest Ref Range: 0.6 - 1.3 MG/DL 0.92   BUN/Creatinine ratio Latest Ref Range: 12 - 20   21 (H)   Calcium Latest Ref Range: 8.5 - 10.1 MG/DL 9.8   GFR est non-AA Latest Ref Range: >60 ml/min/1.73m2 >60   GFR est AA Latest Ref Range: >60 ml/min/1.73m2 >60   Bilirubin, total Latest Ref Range: 0.2 - 1.0 MG/DL 0.3   Protein, total Latest Ref Range: 6.4 - 8.2 g/dL 6.6   Albumin Latest Ref Range: 3.4 - 5.0 g/dL 3.8   Globulin Latest Ref Range: 2.0 - 4.0 g/dL 2.8   A-G Ratio Latest Ref Range: 0.8 - 1.7   1.4   ALT Latest Ref Range: 13 - 56 U/L 26   AST Latest Ref Range: 10 - 38 U/L 23   Alk. phosphatase Latest Ref Range: 45 - 117 U/L 78             The inner/medial lumen of her right chest double lumen port was accessed without incident, and brisk blood return was obtained.  ml IV Bag was initiated to infuse @ KVO PRN throughout treatment today. The following pre-medications were administered as ordered: DECADRON 10 mg IV, & ALOXI 0.25 mg IV.  ml IV Bolus was administered pre-chemotherapy per order. The following chemotherapy medications were administered per order:    Docetaxel (TAXOTERE) 120 mg IV was administered over approximately 1 hour. Cyclophosphamide (CYTOXAN) 1,000 mg IV was administered over approximately 30 minutes. After completion of all ordered IV medications, her port was flushed well per policy with NS & Heparin, and then the silva needle was removed and bandaid was applied. NEULASTA 6 mg SQ On Body Injector was applied to the back of her right arm at 1320. And, after application, the green light was noted to be flashing appropriately. Ms. Marcos Adams tolerated treatment very well today, and voiced no complaints.      Ms. Lucas Guallpa was discharged from [place] [address] in stable condition at 1320. She is to return on 9/15/21 at 56, for her next appointment, for pre-chemo labs.       Romulo Singh  August 27, 2021

## 2021-08-31 ENCOUNTER — OFFICE VISIT (OUTPATIENT)
Dept: SURGERY | Age: 66
End: 2021-08-31
Payer: MEDICARE

## 2021-08-31 VITALS
HEART RATE: 99 BPM | DIASTOLIC BLOOD PRESSURE: 64 MMHG | OXYGEN SATURATION: 96 % | WEIGHT: 131 LBS | SYSTOLIC BLOOD PRESSURE: 110 MMHG | RESPIRATION RATE: 18 BRPM | TEMPERATURE: 98.1 F | BODY MASS INDEX: 22.36 KG/M2 | HEIGHT: 64 IN

## 2021-08-31 DIAGNOSIS — Z98.890 STATUS POST LEFT BREAST LUMPECTOMY: Primary | ICD-10-CM

## 2021-08-31 PROCEDURE — 99024 POSTOP FOLLOW-UP VISIT: CPT | Performed by: SURGERY

## 2021-08-31 RX ORDER — OMEGA-3-ACID ETHYL ESTERS 1 G/1
2 CAPSULE, LIQUID FILLED ORAL
COMMUNITY

## 2021-09-03 NOTE — PROGRESS NOTES
[facility]  General Surgery    Subjective:  Patient presents today without complaints. She states that the first 2 rounds of chemotherapy she held it well but this last 1 is taking a lot out of her. Objective:  Vitals:    08/31/21 1257   BP: 110/64   Pulse: 99   Resp: 18   Temp: 98.1 °F (36.7 °C)   SpO2: 96%   Weight: 59.4 kg (131 lb)   Height: 5' 4\" (1.626 m)       Physical Exam:    General: Awake and alert, oriented x4, no apparent distress   Breasts:    Left: No dimpling, discoloration, nipple inversion or retractions. No axillary or supraclavicular lymphadenopathy. No mass   Right: No dimpling, discoloration, nipple inversion or retractions. No axillary or supraclavicular lymphadenopathy. No mass    Current Medications:  Current Outpatient Medications   Medication Sig Dispense Refill    omega-3 acid ethyl esters (LOVAZA) 1 gram capsule Take 2 g by mouth two (2) times a day.  OTHER Liquid iron      dexAMETHasone (DECADRON) 4 mg tablet Take 8 mg by mouth two (2) times daily (with meals). Please take 8 mg twice daily for three days, starting one day prior to docetaxel administration, (the day before treatment, day of treatment and day after) 36 Tablet 0    zinc 50 mg tab tablet Take  by mouth daily.  ascorbic acid, vitamin C, (Vitamin C) 500 mg tablet Take  by mouth.  fenofibrate nanocrystallized (TRICOR) 145 mg tablet Take 1 Tablet by mouth daily. 90 Tablet 1    ondansetron (ZOFRAN ODT) 8 mg disintegrating tablet Take 1 Tablet by mouth every eight (8) hours as needed for Nausea or Vomiting. Indications: nausea and vomiting caused by cancer drugs 30 Tablet 0    lidocaine-prilocaine (EMLA) topical cream Apply  to affected area as needed (30-60 minutes before port is to be accessed). 30 g 1    calcium citrate-vitamin d3 (CITRACAL+D) 315 mg-5 mcg (200 unit) tab Take 1 Tablet by mouth daily (with breakfast).       pilocarpine (PILOCAR) 1 % ophthalmic solution Administer 1 Drop to both eyes nightly.  telmisartan (MICARDIS) 20 mg tablet Take 1 Tab by mouth daily. 90 Tab 4    coenzyme q10 (CO Q-10) 10 mg cap Take  by mouth.  multivitamin (ONE A DAY) tablet Take 1 Tab by mouth daily.  aspirin 81 mg tablet Take 81 mg by mouth.  prednisoLONE acetate (PRED FORTE) 1 % ophthalmic suspension  (Patient not taking: Reported on 8/31/2021)         Chart and notes reviewed. Data reviewed. I have evaluated and examined the patient. Impression and plan:  Patient with history of right breast cancer status post lumpectomy in 2000 now with triple negative left breast cancer status post lumpectomy and sentinel lymph node biopsy for stage I T1 N0 M0 triple negative breast cancer by 2 months.   Continue monthly self breast exam  Diagnostic left mammogram will be performed after radiation therapy has been completed  Follow-up with me in 4 months     Paige Cabrera MD

## 2021-09-09 RX ORDER — SODIUM CHLORIDE 9 MG/ML
10 INJECTION INTRAMUSCULAR; INTRAVENOUS; SUBCUTANEOUS AS NEEDED
Status: CANCELLED | OUTPATIENT
Start: 2021-09-17

## 2021-09-09 RX ORDER — HYDROCORTISONE SODIUM SUCCINATE 100 MG/2ML
100 INJECTION, POWDER, FOR SOLUTION INTRAMUSCULAR; INTRAVENOUS AS NEEDED
Status: CANCELLED | OUTPATIENT
Start: 2021-09-17

## 2021-09-09 RX ORDER — ACETAMINOPHEN 325 MG/1
650 TABLET ORAL AS NEEDED
Status: CANCELLED
Start: 2021-09-17

## 2021-09-09 RX ORDER — DIPHENHYDRAMINE HYDROCHLORIDE 50 MG/ML
25 INJECTION, SOLUTION INTRAMUSCULAR; INTRAVENOUS AS NEEDED
Status: CANCELLED
Start: 2021-09-17

## 2021-09-09 RX ORDER — ALBUTEROL SULFATE 0.83 MG/ML
2.5 SOLUTION RESPIRATORY (INHALATION) AS NEEDED
Status: CANCELLED
Start: 2021-09-17

## 2021-09-09 RX ORDER — EPINEPHRINE 1 MG/ML
0.3 INJECTION, SOLUTION, CONCENTRATE INTRAVENOUS AS NEEDED
Status: CANCELLED | OUTPATIENT
Start: 2021-09-17

## 2021-09-09 RX ORDER — ONDANSETRON 2 MG/ML
8 INJECTION INTRAMUSCULAR; INTRAVENOUS AS NEEDED
Status: CANCELLED | OUTPATIENT
Start: 2021-09-17

## 2021-09-09 RX ORDER — DIPHENHYDRAMINE HYDROCHLORIDE 50 MG/ML
50 INJECTION, SOLUTION INTRAMUSCULAR; INTRAVENOUS AS NEEDED
Status: CANCELLED
Start: 2021-09-17

## 2021-09-15 ENCOUNTER — HOSPITAL ENCOUNTER (OUTPATIENT)
Dept: INFUSION THERAPY | Age: 66
Discharge: HOME | End: 2021-09-15
Payer: MEDICARE

## 2021-09-15 ENCOUNTER — OFFICE VISIT (OUTPATIENT)
Dept: ONCOLOGY | Age: 66
End: 2021-09-15
Payer: MEDICARE

## 2021-09-15 VITALS
SYSTOLIC BLOOD PRESSURE: 130 MMHG | OXYGEN SATURATION: 96 % | BODY MASS INDEX: 22.43 KG/M2 | HEART RATE: 105 BPM | WEIGHT: 131.4 LBS | TEMPERATURE: 98.6 F | DIASTOLIC BLOOD PRESSURE: 73 MMHG | RESPIRATION RATE: 16 BRPM | HEIGHT: 64 IN

## 2021-09-15 DIAGNOSIS — C50.912 BREAST CANCER, STAGE 1, ESTROGEN RECEPTOR NEGATIVE, LEFT (HCC): ICD-10-CM

## 2021-09-15 DIAGNOSIS — C50.912 BREAST CANCER, STAGE 1, ESTROGEN RECEPTOR NEGATIVE, LEFT (HCC): Primary | ICD-10-CM

## 2021-09-15 DIAGNOSIS — C50.919 TRIPLE NEGATIVE MALIGNANT NEOPLASM OF BREAST (HCC): ICD-10-CM

## 2021-09-15 DIAGNOSIS — Z17.1 BREAST CANCER, STAGE 1, ESTROGEN RECEPTOR NEGATIVE, LEFT (HCC): Primary | ICD-10-CM

## 2021-09-15 DIAGNOSIS — Z17.1 BREAST CANCER, STAGE 1, ESTROGEN RECEPTOR NEGATIVE, LEFT (HCC): ICD-10-CM

## 2021-09-15 DIAGNOSIS — D64.9 NORMOCYTIC ANEMIA: ICD-10-CM

## 2021-09-15 LAB
ALBUMIN SERPL-MCNC: 3.7 G/DL (ref 3.4–5)
ALBUMIN/GLOB SERPL: 1.2 {RATIO} (ref 0.8–1.7)
ALP SERPL-CCNC: 72 U/L (ref 45–117)
ALT SERPL-CCNC: 21 U/L (ref 13–56)
ANION GAP SERPL CALC-SCNC: 7 MMOL/L (ref 3–18)
AST SERPL-CCNC: 22 U/L (ref 10–38)
BASOPHILS # BLD: 0.1 K/UL (ref 0–0.1)
BASOPHILS NFR BLD: 1 % (ref 0–2)
BILIRUB SERPL-MCNC: 0.4 MG/DL (ref 0.2–1)
BUN SERPL-MCNC: 15 MG/DL (ref 7–18)
BUN/CREAT SERPL: 16 (ref 12–20)
CALCIUM SERPL-MCNC: 9.5 MG/DL (ref 8.5–10.1)
CHLORIDE SERPL-SCNC: 107 MMOL/L (ref 100–111)
CO2 SERPL-SCNC: 27 MMOL/L (ref 21–32)
CREAT SERPL-MCNC: 0.94 MG/DL (ref 0.6–1.3)
DIFFERENTIAL METHOD BLD: ABNORMAL
EOSINOPHIL # BLD: 0 K/UL (ref 0–0.4)
EOSINOPHIL NFR BLD: 0 % (ref 0–5)
ERYTHROCYTE [DISTWIDTH] IN BLOOD BY AUTOMATED COUNT: 15.7 % (ref 11.6–14.5)
GLOBULIN SER CALC-MCNC: 3 G/DL (ref 2–4)
GLUCOSE SERPL-MCNC: 100 MG/DL (ref 74–99)
HCT VFR BLD AUTO: 35.5 % (ref 35–45)
HGB BLD-MCNC: 11 G/DL (ref 12–16)
LYMPHOCYTES # BLD: 1.6 K/UL (ref 0.9–3.6)
LYMPHOCYTES NFR BLD: 19 % (ref 21–52)
MCH RBC QN AUTO: 30.8 PG (ref 24–34)
MCHC RBC AUTO-ENTMCNC: 31 G/DL (ref 31–37)
MCV RBC AUTO: 99.4 FL (ref 78–100)
MONOCYTES # BLD: 1.2 K/UL (ref 0.05–1.2)
MONOCYTES NFR BLD: 14 % (ref 3–10)
NEUTS SEG # BLD: 5.5 K/UL (ref 1.8–8)
NEUTS SEG NFR BLD: 65 % (ref 40–73)
PLATELET # BLD AUTO: 479 K/UL (ref 135–420)
PMV BLD AUTO: 8.8 FL (ref 9.2–11.8)
POTASSIUM SERPL-SCNC: 4.3 MMOL/L (ref 3.5–5.5)
PROT SERPL-MCNC: 6.7 G/DL (ref 6.4–8.2)
RBC # BLD AUTO: 3.57 M/UL (ref 4.2–5.3)
SODIUM SERPL-SCNC: 141 MMOL/L (ref 136–145)
WBC # BLD AUTO: 8.5 K/UL (ref 4.6–13.2)

## 2021-09-15 PROCEDURE — 36415 COLL VENOUS BLD VENIPUNCTURE: CPT

## 2021-09-15 PROCEDURE — G0463 HOSPITAL OUTPT CLINIC VISIT: HCPCS | Performed by: INTERNAL MEDICINE

## 2021-09-15 PROCEDURE — 80053 COMPREHEN METABOLIC PANEL: CPT

## 2021-09-15 PROCEDURE — 99214 OFFICE O/P EST MOD 30 MIN: CPT | Performed by: INTERNAL MEDICINE

## 2021-09-15 PROCEDURE — 85025 COMPLETE CBC W/AUTO DIFF WBC: CPT

## 2021-09-15 NOTE — PROGRESS NOTES
Hematology/Oncology Note      Date: 9/15/2021    Name: Larwance Harada  : 1955        Fareed Singleton MD       Oncology History  Ms. Prince Mckeon is a most pleasant 76y.o. year old female who was seen for consultation of triple negative left breast cancer. -- The patient has a past medical history significant of hypertension, hypercholesterolemia, obstructive sleep apnea. -- Postmenopausal,  2, Para 2. Menarche at 14. First birth at 24 and menopause at 63.  -- 21 Screening mammogram reported possible posterior lateral left breast mass. -- 21 Diagnostic mammogram reported left breast oval, heterogeneously hypoechoic mass with microlobulated margins at the 3:30 position, 8 cfn, measuring 0.8 x 0.7 x 0.3 cm.  -- 21 Biopsy reported invasive ductal carcinoma, grade 3, ER/DC negative, HER2 negative. -- 21 MRI breast reported left breast mass at 3:30, 0.7 cm malignant mass (correlating to biopsied mass). -- 06/10/21 S.p Tag localized lumpectomy left breast with left sentinel lymph node biopsy with ultrasound-guided right internal jugular Mediport placement. Lumpectomy with SLNBx reported invasive ductal adenocarcinoma, grade 3, 5 mm, 0/2 lymph nodes involved, and negative margins (9 mm posterior). pT1bN0  -- Given her high graded histology and TNBC we have suggested adjuvantly chemotherapy. For those patients with node-negative, T <1 cm, we have recommended non-anthracycline-based regimens to avoid the risks associated with anthracyclines. -- 2021 C1 D1 Adjuvant TC      Subjective:     Chief complaint: Triple negative left breast cancer    History of Present Illness:   She has started adjuvant chemotherapy. The patient was accompanied by her family during this visit. She reported tolerated therapy fairly well. She has reported some hair loss. She has noted fatigue and generalized weakness after each cycle of therapy. The patient otherwise has no other complaints.  She denied any fever, chills, night sweat, unintentional weight loss, skin lumps or bumps, acute bleeding or bruising issues. Denied headache, acute vision change, dizziness, chest pain, worsen shortness of breath, palpitation, productive cough, nausea, vomiting, abdominal pain, altered bowel habits, dysuria, worsen bone pain or back pain, new focal numbness or weakness.            Past Medical History, Family History, and Social History:    Past Medical History:   Diagnosis Date    Cancer [facility])     left breast    High cholesterol     Hypertension     Sleep apnea     patient denies     Past Surgical History:   Procedure Laterality Date    HX BLADDER SUSPENSION      HX BREAST BIOPSY Left 6/10/2021    TAG LOCALIZED LUMPECTOMY LEFT BREAST WITH LEFT AXILLARY SENTINEL LYMPH NODE BIOPSY (TAG 6.7.21 [facility] 1300; SLNB 6.9.21 [facility] 1030) performed by Liu Ogden MD at [address] [place] [address] HX GYN      hysterectomy    HX HEENT Bilateral 05/13/2021    Bilateral Laser surgery: Anatomical narrow angle, bilateral    HX OOPHORECTOMY Left     OR BREAST SURGERY PROCEDURE UNLISTED  2000    right lumpectomy     Social History     Socioeconomic History    Marital status:      Spouse name: Not on file    Number of children: Not on file    Years of education: Not on file    Highest education level: Not on file   Occupational History    Not on file   Tobacco Use    Smoking status: Never Smoker    Smokeless tobacco: Never Used   Vaping Use    Vaping Use: Never used   Substance and Sexual Activity    Alcohol use: Yes     Comment: rare    Drug use: Never    Sexual activity: Not Currently     Partners: Male     Birth control/protection: None   Other Topics Concern    Not on file   Social History Narrative    Not on file     Social Determinants of Health     Financial Resource Strain:     Difficulty of Paying Living Expenses:    Food Insecurity:     Worried About Running Out of Food in the Last Year:     Ran Out of Food in the Last Year:    Transportation Needs:     Lack of Transportation (Medical):  Lack of Transportation (Non-Medical):    Physical Activity:     Days of Exercise per Week:     Minutes of Exercise per Session:    Stress:     Feeling of Stress :    Social Connections:     Frequency of Communication with Friends and Family:     Frequency of Social Gatherings with Friends and Family:     Attends Yazidi Services:     Active Member of Clubs or Organizations:     Attends Club or Organization Meetings:     Marital Status:    Intimate Partner Violence:     Fear of Current or Ex-Partner:     Emotionally Abused:     Physically Abused:     Sexually Abused:      Family History   Problem Relation Age of Onset    Dementia Mother     Hypertension Mother     High Cholesterol Mother     Other Mother         pre-dm    No Known Problems Son     No Known Problems Son     Other Father         homicide    Other Sister         AIDS    No Known Problems Brother     COPD Sister     No Known Problems Sister      Current Outpatient Medications   Medication Sig Dispense Refill    b complex-vitamin c-folic acid 0.8 mg (NEPHRO-CHOCO) 0.8 mg tab tablet Take 1 Tablet by mouth daily. Indications: vitamin deficiency      omega-3 acid ethyl esters (LOVAZA) 1 gram capsule Take 2 g by mouth two (2) times a day.  OTHER Liquid iron      dexAMETHasone (DECADRON) 4 mg tablet Take 8 mg by mouth two (2) times daily (with meals). Please take 8 mg twice daily for three days, starting one day prior to docetaxel administration, (the day before treatment, day of treatment and day after) 36 Tablet 0    prednisoLONE acetate (PRED FORTE) 1 % ophthalmic suspension       zinc 50 mg tab tablet Take  by mouth daily.  ascorbic acid, vitamin C, (Vitamin C) 500 mg tablet Take  by mouth.  fenofibrate nanocrystallized (TRICOR) 145 mg tablet Take 1 Tablet by mouth daily.  90 Tablet 1    ondansetron (ZOFRAN ODT) 8 mg disintegrating tablet Take 1 Tablet by mouth every eight (8) hours as needed for Nausea or Vomiting. Indications: nausea and vomiting caused by cancer drugs 30 Tablet 0    lidocaine-prilocaine (EMLA) topical cream Apply  to affected area as needed (30-60 minutes before port is to be accessed). 30 g 1    calcium citrate-vitamin d3 (CITRACAL+D) 315 mg-5 mcg (200 unit) tab Take 1 Tablet by mouth daily (with breakfast).  pilocarpine (PILOCAR) 1 % ophthalmic solution Administer 1 Drop to both eyes nightly.  telmisartan (MICARDIS) 20 mg tablet Take 1 Tab by mouth daily. 90 Tab 4    coenzyme q10 (CO Q-10) 10 mg cap Take  by mouth.  multivitamin (ONE A DAY) tablet Take 1 Tab by mouth daily.  aspirin 81 mg tablet Take 81 mg by mouth. Review of Systems   Constitutional: Negative for chills, diaphoresis, fever, malaise/fatigue and weight loss. Respiratory: Negative for cough, hemoptysis, shortness of breath and wheezing. Cardiovascular: Negative for chest pain, palpitations and leg swelling. Gastrointestinal: Negative for abdominal pain, diarrhea, heartburn, nausea and vomiting. Genitourinary: Negative for dysuria, frequency, hematuria and urgency. Musculoskeletal: Negative for joint pain and myalgias. Skin: Negative for itching and rash. Neurological: Negative for dizziness, seizures, weakness and headaches. Psychiatric/Behavioral: Negative for depression. The patient does not have insomnia.              Objective:     Visit Vitals  /73 (BP 1 Location: Right arm, BP Patient Position: Sitting)   Pulse (!) 105   Temp 98.6 °F (37 °C) (Oral)   Resp 16   Ht 5' 4\" (1.626 m)   Wt 59.6 kg (131 lb 6.4 oz)   SpO2 96%   BMI 22.55 kg/m²       ECOG Performance Status (grade): 0   0 - able to carry on all pre-disease activity w/out restriction  1 - restricted but able to carry out light work  2 - ambulatory and can self- care but unable to carry out work  3 - bed or chair >50% of waking hours  4 - completely disable, total care, confined to bed or chair    Physical Exam  Constitutional:       Appearance: Normal appearance. HENT:      Head: Normocephalic and atraumatic. Eyes:      Pupils: Pupils are equal, round, and reactive to light. Cardiovascular:      Rate and Rhythm: Normal rate and regular rhythm. Heart sounds: Normal heart sounds. Pulmonary:      Effort: Pulmonary effort is normal.      Breath sounds: Normal breath sounds. Abdominal:      General: Bowel sounds are normal.      Palpations: Abdomen is soft. Tenderness: There is no abdominal tenderness. There is no guarding. Musculoskeletal:         General: Normal range of motion. Cervical back: Neck supple. Right lower leg: No edema. Left lower leg: No edema. Skin:     General: Skin is warm. Neurological:      General: No focal deficit present. Mental Status: She is alert and oriented to person, place, and time. Mental status is at baseline. Diagnostics:      No results found for this or any previous visit (from the past 96 hour(s)). Imaging:  No results found for this or any previous visit. Results for orders placed during the hospital encounter of 06/10/21    XR CHEST PORT    Narrative  ONE VIEW CHEST RADIOGRAPH    INDICATION: post op. Status post left breast lumpectomy and sentinel lymph node  biopsy with right Mediport placement. COMPARISON: Chest radiograph 5/14/2021. TECHNIQUE: AP view of the chest    FINDINGS:    LINES/TUBES: Right chest wall Mediport tip is in the SVC. MEDIASTINUM: Cardiac silhouette is within normal limits. LUNGS: Low lung volumes. No pneumothorax. Minimal left basilar atelectasis. No  evidence for pneumonia. No pleural effusion. BONES/OTHER: No acute osseous abnormality. Surgical staples and subcutaneous air  left breast and axilla consistent with recent surgery. Impression  Right chest wall Mediport tip in the SVC.  No pneumothorax. Results for orders placed during the hospital encounter of 12/23/12    CT SINUSES WO CONT    Narrative  CT paranasal sinuses    CPT CODE: 17258    HISTORY: Headache and malaise    COMPARISON: None. TECHNIQUE:  Volumetric axial scans of the paranasal sinuses were performed. Sagittal and coronal reformations were performed to improve demonstration of  orbital meatal complexes and nasal drainage passageways. FINDINGS:    The nasal septum demonstrates mild right-to-left deviation. The bilateral nasal  cavities appear patent. The right inferior turbinate is hypertrophied. The  paranasal sinuses appear patent including bilateral frontal, ethmoidal,  sphenoidal and maxillary sinuses. The right middle turbinate is enlarged and  aerated. The bilateral osteomeatal complexes appear patent. No evidence of  soft tissue density, mucosal thickening or air fluid level identified. Visualized portion of facial bones and both orbits appear unremarkable. Impression  :    No evidence for sinusitis. Hypertrophy inferior nasal turbinate - allergic rhinitis. Pathology  5/4/2021     LEFT BREAST, 3:30, 8 CM FROM NIPPLE, BIOPSY:   INVASIVE DUCTAL CARCINOMA. INVASIVE CARCINOMA OF THE BREAST: Biopsy   SPECIMEN   Procedure: Needle biopsy   Specimen Laterality: Left   TUMOR   Tumor Site: Clock position - 3 o'clock   Histologic Type: Invasive carcinoma of no special type (ductal)   Glandular (Acinar) / Tubular Differentiation: Score 2   Nuclear Pleomorphism: Score 3   Mitotic Rate: Score 3   Overall Grade: Grade 3 (scores of 8 or 9)   Tumor Size: 6 mm   Ductal Carcinoma In Situ (DCIS): Not identified   Lymphovascular Invasion: Not identified   Microcalcifications: Not identified     ER/AR Results:   Estrogen Receptor (ER): Negative (0%, Internal controls present and stain as expected). Progesterone Receptor (PgR): Negative (0%, Internal controls present and stain as expected).    Her-2 (IHC Method): Negative (score 1+)               Assessment:                                        1. Breast cancer, stage 1, estrogen receptor negative, left ([facility] [address]    2. Triple negative malignant neoplasm of breast ([facility] [address]    3. Normocytic anemia        Plan:                                        Left breast cancer, Triple negative invasive ductal adenocarcinoma   -- Diagnosed 6/24/2021 Stage IB, T1b, pN0, M0, G3, HER2 Neg, ER Neg, KY N  -- Left triple negative invasive ductal adenocarcinoma s/p lumpectomy with SLNBx (pT1bN0, grade 3, 0/2 lymph nodes involved, negative margins)  -- 03/23/21 Screening mammogram reported possible posterior lateral left breast mass. -- 04/27/21 Diagnostic mammogram reported left breast oval, heterogeneously hypoechoic mass  with microlobulated margins at the 3:30 position, 8 cfn, measuring 0.8 x 0.7 x 0.3 cm.  -- 05/04/21 Biopsy reported invasive ductal carcinoma, grade 3, ER/KY negative, HER2 negative. -- 05/25/21 MRI breast reported left breast mass at 3:30, 0.7 cm malignant mass (correlating to biopsied mass). -- 06/10/21 S.p Tag localized lumpectomy left breast with left sentinel lymph node biopsy with ultrasound-guided right internal jugular Mediport placement. Lumpectomy with SLNBx reported invasive ductal adenocarcinoma, grade 3, 5 mm, 0/2 lymph nodes involved, and negative margins (9 mm posterior). pT1bN0  -- Given her high graded histology and TNBC we have suggested adjuvantly chemotherapy. For those patients with node-negative, T <1 cm, we have recommended non-anthracycline-based regimens to avoid the risks associated with anthracyclines. -- 7/16/2021 C1 D1 Adjuvant TC  -- 8/6/2021 C2 D1   -- 8/27/2021 C3 D1  The patient has tolerated chemotherapy without high-grade toxicities. Recent lab reviewed with the patient. Plan:  -- She will continue C4 D1 docetaxel cyclophosphamide as scheduled, 9/17 tentatively  -- Lab check CBC, CMP. Pending prechemo labs today.   --The patient will f/u with Rad-Onc after completes chenotherapy for adjuvant XRT  --The patient will RTC in 3 months for follow-up, always sooner as needed. .       TC  Day 1: Docetaxel 75mg/m2 IV over 60 minutes  Day 1: Cyclophosphamide 600mg/m2 IV over 30 minutes. Repeat cycle every 21 days for 4 cycles      Normocytic Anemia  -- 8/3/2021 CBC reported hemoglobin 11.8, hematocrit 34.8%  -- Likely chemotherapy related  -- continue lab check CBC, CMP    Orders Placed This Encounter    b complex-vitamin c-folic acid 0.8 mg (NEPHRO-CHOCO) 0.8 mg tab tablet     Sig: Take 1 Tablet by mouth daily. Indications: vitamin deficiency           Ms. Hany Multani has a reminder for a \"due or due soon\" health maintenance. I have asked that she contact her primary care provider for follow-up on this health maintenance. All of patient's questions answered to their apparent satisfaction. They verbally show understanding and agreement with aforementioned plan. Marivel Rivas MD  9/15/2021        Parts of this document has been produced using Dragon dictation system. Unrecognized errors in transcription may be present. Please do not hesitate to reach out for any questions or clarifications. CC: Mary Beth Rojo MD; Cuong Taylor M.D.; Selena Corbin M.D.

## 2021-09-15 NOTE — PROGRESS NOTES
[facility] OPI Progress Note    Date: September 15, 2021    Name: Nathaniel Kerns    MRN: [id number]         : 1955    Pre chemo labs    Ms. Vijaya Hyde arrived to [facility] at [phone] for peripheral lab draw. PT c/o fatigue; otherwise NAD. Ms. Adarsh Cardozo vitals were reviewed. Visit Vitals  Wt 59.6 kg (131 lb 6.4 oz)   Breastfeeding No   BMI 22.55 kg/m²         Blood drawn for labs via Right antecubital venipuncture x1 attempt using 23g butterfly collection set, gauze and coban applied to site. Ms. Vijaya Hyde tolerated well without complaints. Ms. Vijaya Hyde was discharged from [place] [address] in stable condition at 1050. She is to return on 21 at 0900 for her next appointment.     Dione Roca RN  September 15, 2021

## 2021-09-17 ENCOUNTER — HOSPITAL ENCOUNTER (OUTPATIENT)
Dept: INFUSION THERAPY | Age: 66
Discharge: HOME | End: 2021-09-17
Payer: MEDICARE

## 2021-09-17 VITALS
RESPIRATION RATE: 18 BRPM | TEMPERATURE: 97.4 F | HEART RATE: 94 BPM | WEIGHT: 130 LBS | DIASTOLIC BLOOD PRESSURE: 80 MMHG | HEIGHT: 64 IN | BODY MASS INDEX: 22.2 KG/M2 | SYSTOLIC BLOOD PRESSURE: 137 MMHG | OXYGEN SATURATION: 99 %

## 2021-09-17 DIAGNOSIS — C50.912 BREAST CANCER, STAGE 1, ESTROGEN RECEPTOR NEGATIVE, LEFT (HCC): Primary | ICD-10-CM

## 2021-09-17 DIAGNOSIS — Z17.1 BREAST CANCER, STAGE 1, ESTROGEN RECEPTOR NEGATIVE, LEFT (HCC): Primary | ICD-10-CM

## 2021-09-17 PROCEDURE — 77030012965 HC NDL HUBR BBMI -A

## 2021-09-17 PROCEDURE — 74011250636 HC RX REV CODE- 250/636: Performed by: INTERNAL MEDICINE

## 2021-09-17 PROCEDURE — 96413 CHEMO IV INFUSION 1 HR: CPT

## 2021-09-17 PROCEDURE — 96375 TX/PRO/DX INJ NEW DRUG ADDON: CPT

## 2021-09-17 PROCEDURE — 96417 CHEMO IV INFUS EACH ADDL SEQ: CPT

## 2021-09-17 PROCEDURE — 96377 APPLICATON ON-BODY INJECTOR: CPT

## 2021-09-17 RX ORDER — SODIUM CHLORIDE 0.9 % (FLUSH) 0.9 %
10 SYRINGE (ML) INJECTION AS NEEDED
Status: DISPENSED | OUTPATIENT
Start: 2021-09-17 | End: 2021-09-17

## 2021-09-17 RX ORDER — DEXAMETHASONE SODIUM PHOSPHATE 4 MG/ML
10 INJECTION, SOLUTION INTRA-ARTICULAR; INTRALESIONAL; INTRAMUSCULAR; INTRAVENOUS; SOFT TISSUE ONCE
Status: COMPLETED | OUTPATIENT
Start: 2021-09-17 | End: 2021-09-17

## 2021-09-17 RX ORDER — SODIUM CHLORIDE 9 MG/ML
25 INJECTION, SOLUTION INTRAVENOUS CONTINUOUS
Status: DISPENSED | OUTPATIENT
Start: 2021-09-17 | End: 2021-09-17

## 2021-09-17 RX ORDER — PALONOSETRON 0.05 MG/ML
0.25 INJECTION, SOLUTION INTRAVENOUS ONCE
Status: COMPLETED | OUTPATIENT
Start: 2021-09-17 | End: 2021-09-17

## 2021-09-17 RX ORDER — HEPARIN 100 UNIT/ML
300-500 SYRINGE INTRAVENOUS AS NEEDED
Status: DISPENSED | OUTPATIENT
Start: 2021-09-17 | End: 2021-09-17

## 2021-09-17 RX ADMIN — PEGFILGRASTIM 6 MG: KIT SUBCUTANEOUS at 11:50

## 2021-09-17 RX ADMIN — HEPARIN 500 UNITS: 100 SYRINGE at 11:46

## 2021-09-17 RX ADMIN — SODIUM CHLORIDE 25 ML/HR: 9 INJECTION, SOLUTION INTRAVENOUS at 09:55

## 2021-09-17 RX ADMIN — PALONOSETRON 0.25 MG: 0.05 INJECTION, SOLUTION INTRAVENOUS at 09:15

## 2021-09-17 RX ADMIN — DOCETAXEL ANHYDROUS 120 MG: 10 INJECTION, SOLUTION INTRAVENOUS at 10:02

## 2021-09-17 RX ADMIN — Medication 10 ML: at 11:46

## 2021-09-17 RX ADMIN — SODIUM CHLORIDE 500 ML: 9 INJECTION, SOLUTION INTRAVENOUS at 09:15

## 2021-09-17 RX ADMIN — DEXAMETHASONE SODIUM PHOSPHATE 10 MG: 4 INJECTION INTRA-ARTICULAR; INTRALESIONAL; INTRAMUSCULAR; INTRAVENOUS; SOFT TISSUE at 09:15

## 2021-09-17 RX ADMIN — CYCLOPHOSPHAMIDE 1000 MG: 200 INJECTION, SOLUTION INTRAVENOUS at 11:13

## 2021-09-17 NOTE — PROGRESS NOTES
[facility] Progress Note    Date: 2021    Name: Nathaniel Kerns    MRN: [id number]         : 1955    Ms. Vijaya Hyde arrived in the [facility] today at 0915, in stable condition, here for Cycle 4, IV TAXOTERE + CYTOXAN Chemotherapy Regimen (Every 21 Day Cycle). She was assessed and education was provided. Ms. Adarsh Cardozo vitals were reviewed. Visit Vitals  /80 (BP 1 Location: Right arm, BP Patient Position: Sitting)   Pulse 94   Temp 97.4 °F (36.3 °C)   Resp 18   Ht 5' 4\" (1.626 m)   Wt 59 kg (130 lb)   SpO2 99%   BMI 22.31 kg/m²     Ms. Vijaya Hyde stated that she DID take her oral Decadron yesterday & today, as prescribed. Lab results were reviewed. Her pre-chemo labs obtained on 9/15/21 were all reviewed, and were all noted to be satisfactory for treatment today. The lateral lumen of her right chest double lumen port was accessed without incident, and brisk blood return was obtained.  ml IV Bag was initiated to infuse @ KVO PRN throughout treatment today. The following pre-medications were administered as ordered: DECADRON 10 mg IV, & ALOXI 0.25 mg IV.  ml IV Bolus was administered pre-chemotherapy per order. The following chemotherapy medications were administered per order:    Docetaxel (TAXOTERE) 120 mg IV was administered over approximately 1 hour. Cyclophosphamide (CYTOXAN) 1,000 mg IV was administered over approximately 30 minutes. After completion of all ordered IV medications, her port was flushed well per policy with NS & Heparin, and then the silva needle was removed and bandaid was applied. NEULASTA 6 mg SQ On Body Injector was applied to the back of her right arm at 1150. And, after application, the green light was noted to be flashing appropriately. Ms. Vijaya Hyde tolerated treatment very well today, and voiced no complaints. Ms. Vijaya Hyde was discharged from [place] [address] in stable condition at 1200.  She is to return on 10/22/21 at 0930 for next appointment for port flush.     Kinza Holm RN  September 17, 2021

## 2021-10-08 ENCOUNTER — HOSPITAL ENCOUNTER (OUTPATIENT)
Dept: RADIATION THERAPY | Age: 66
Discharge: HOME | End: 2021-10-08
Payer: MEDICARE

## 2021-10-08 PROCEDURE — G2212 PROLONG OUTPT/OFFICE VIS: HCPCS | Performed by: RADIOLOGY

## 2021-10-08 PROCEDURE — 99215 OFFICE O/P EST HI 40 MIN: CPT | Performed by: RADIOLOGY

## 2021-10-08 PROCEDURE — 99211 OFF/OP EST MAY X REQ PHY/QHP: CPT

## 2021-10-14 ENCOUNTER — HOSPITAL ENCOUNTER (OUTPATIENT)
Dept: RADIATION THERAPY | Age: 66
Discharge: HOME | End: 2021-10-14
Payer: MEDICARE

## 2021-10-14 PROCEDURE — 77290 THER RAD SIMULAJ FIELD CPLX: CPT

## 2021-10-14 PROCEDURE — 77332 RADIATION TREATMENT AID(S): CPT

## 2021-10-14 PROCEDURE — 77263 THER RADIOLOGY TX PLNG CPLX: CPT | Performed by: RADIOLOGY

## 2021-10-15 ENCOUNTER — HOSPITAL ENCOUNTER (OUTPATIENT)
Dept: RADIATION THERAPY | Age: 66
Discharge: HOME | End: 2021-10-15
Payer: MEDICARE

## 2021-10-15 PROCEDURE — 77334 RADIATION TREATMENT AID(S): CPT

## 2021-10-15 PROCEDURE — 77300 RADIATION THERAPY DOSE PLAN: CPT

## 2021-10-15 PROCEDURE — 77295 3-D RADIOTHERAPY PLAN: CPT

## 2021-10-21 ENCOUNTER — HOSPITAL ENCOUNTER (OUTPATIENT)
Dept: RADIATION THERAPY | Age: 66
Discharge: HOME | End: 2021-10-21
Payer: MEDICARE

## 2021-10-21 PROCEDURE — 77427 RADIATION TX MANAGEMENT X5: CPT | Performed by: RADIOLOGY

## 2021-10-21 PROCEDURE — 77280 THER RAD SIMULAJ FIELD SMPL: CPT

## 2021-10-21 PROCEDURE — 77412 RADIATION TX DELIVERY LVL 3: CPT

## 2021-10-22 ENCOUNTER — HOSPITAL ENCOUNTER (OUTPATIENT)
Dept: INFUSION THERAPY | Age: 66
Discharge: HOME | End: 2021-10-22
Payer: MEDICARE

## 2021-10-22 ENCOUNTER — HOSPITAL ENCOUNTER (OUTPATIENT)
Dept: RADIATION THERAPY | Age: 66
Discharge: HOME | End: 2021-10-22
Payer: MEDICARE

## 2021-10-22 VITALS
OXYGEN SATURATION: 98 % | DIASTOLIC BLOOD PRESSURE: 76 MMHG | TEMPERATURE: 98 F | RESPIRATION RATE: 16 BRPM | SYSTOLIC BLOOD PRESSURE: 124 MMHG | HEART RATE: 77 BPM

## 2021-10-22 PROCEDURE — 77030012965 HC NDL HUBR BBMI -A

## 2021-10-22 PROCEDURE — 77412 RADIATION TX DELIVERY LVL 3: CPT

## 2021-10-22 PROCEDURE — 96523 IRRIG DRUG DELIVERY DEVICE: CPT

## 2021-10-22 PROCEDURE — 74011250636 HC RX REV CODE- 250/636: Performed by: INTERNAL MEDICINE

## 2021-10-22 PROCEDURE — 77387 GUIDANCE FOR RADJ TX DLVR: CPT

## 2021-10-22 RX ORDER — HEPARIN 100 UNIT/ML
500 SYRINGE INTRAVENOUS ONCE
Status: COMPLETED | OUTPATIENT
Start: 2021-10-22 | End: 2021-10-22

## 2021-10-22 RX ORDER — SODIUM CHLORIDE 0.9 % (FLUSH) 0.9 %
5-10 SYRINGE (ML) INJECTION AS NEEDED
Status: DISCONTINUED | OUTPATIENT
Start: 2021-10-22 | End: 2021-10-26 | Stop reason: HOSPADM

## 2021-10-22 RX ADMIN — HEPARIN 500 UNITS: 100 SYRINGE at 09:39

## 2021-10-22 RX ADMIN — Medication 10 ML: at 09:39

## 2021-10-22 NOTE — PROGRESS NOTES
[facility] Progress Note    Date: 2021    Name: Mónica Gray    MRN: [id number]         : 1955    Port Flush q 4 weeks    Ms. Sherita Paz was assessed and education was provided. PT denies any complaints currently. Ms. Anca Wilhelm vitals were reviewed and patient was observed for 5 minutes prior to treatment. Visit Vitals  /76   Pulse 77   Temp 98 °F (36.7 °C)   Resp 16   SpO2 98%   Breastfeeding No     Dual ports both accessed per protocol, flush easily with brisk blood return. Lines flushed with NS and heparin and de-accessed per protocol. Band aid applied. Ms. Sherita Paz tolerated the infusion, and had no complaints. Patient armband removed and shredded. Ms. Sherita Paz was discharged from [place] [address] in stable condition at [address] She is to return on 2021 at 0930 for her next appointment.     Leslie Louis RN  2021  12:49 PM

## 2021-10-25 ENCOUNTER — HOSPITAL ENCOUNTER (OUTPATIENT)
Dept: RADIATION THERAPY | Age: 66
Discharge: HOME | End: 2021-10-25
Payer: MEDICARE

## 2021-10-25 PROCEDURE — 77387 GUIDANCE FOR RADJ TX DLVR: CPT

## 2021-10-25 PROCEDURE — 77412 RADIATION TX DELIVERY LVL 3: CPT

## 2021-10-26 ENCOUNTER — HOSPITAL ENCOUNTER (OUTPATIENT)
Dept: RADIATION THERAPY | Age: 66
Discharge: HOME | End: 2021-10-26
Payer: MEDICARE

## 2021-10-26 PROCEDURE — 77412 RADIATION TX DELIVERY LVL 3: CPT

## 2021-10-26 PROCEDURE — 77387 GUIDANCE FOR RADJ TX DLVR: CPT

## 2021-10-27 ENCOUNTER — HOSPITAL ENCOUNTER (OUTPATIENT)
Dept: RADIATION THERAPY | Age: 66
Discharge: HOME | End: 2021-10-27
Payer: MEDICARE

## 2021-10-27 PROCEDURE — 77412 RADIATION TX DELIVERY LVL 3: CPT

## 2021-10-27 PROCEDURE — 77387 GUIDANCE FOR RADJ TX DLVR: CPT

## 2021-10-27 PROCEDURE — 77336 RADIATION PHYSICS CONSULT: CPT

## 2021-10-28 ENCOUNTER — HOSPITAL ENCOUNTER (OUTPATIENT)
Dept: LAB | Age: 66
Discharge: HOME | End: 2021-10-28
Payer: MEDICARE

## 2021-10-28 ENCOUNTER — HOSPITAL ENCOUNTER (OUTPATIENT)
Dept: RADIATION THERAPY | Age: 66
Discharge: HOME | End: 2021-10-28
Payer: MEDICARE

## 2021-10-28 DIAGNOSIS — I10 ESSENTIAL HYPERTENSION: ICD-10-CM

## 2021-10-28 DIAGNOSIS — E78.5 HYPERLIPIDEMIA, UNSPECIFIED HYPERLIPIDEMIA TYPE: ICD-10-CM

## 2021-10-28 DIAGNOSIS — R73.01 IFG (IMPAIRED FASTING GLUCOSE): ICD-10-CM

## 2021-10-28 LAB
ALBUMIN SERPL-MCNC: 3.7 G/DL (ref 3.4–5)
ALBUMIN/GLOB SERPL: 1.3 {RATIO} (ref 0.8–1.7)
ALP SERPL-CCNC: 83 U/L (ref 45–117)
ALT SERPL-CCNC: 26 U/L (ref 13–56)
ANION GAP SERPL CALC-SCNC: 3 MMOL/L (ref 3–18)
AST SERPL-CCNC: 26 U/L (ref 10–38)
BILIRUB SERPL-MCNC: 0.4 MG/DL (ref 0.2–1)
BUN SERPL-MCNC: 18 MG/DL (ref 7–18)
BUN/CREAT SERPL: 19 (ref 12–20)
CALCIUM SERPL-MCNC: 9.7 MG/DL (ref 8.5–10.1)
CHLORIDE SERPL-SCNC: 114 MMOL/L (ref 100–111)
CHOLEST SERPL-MCNC: 195 MG/DL
CO2 SERPL-SCNC: 26 MMOL/L (ref 21–32)
CREAT SERPL-MCNC: 0.96 MG/DL (ref 0.6–1.3)
EST. AVERAGE GLUCOSE BLD GHB EST-MCNC: 111 MG/DL
GLOBULIN SER CALC-MCNC: 2.8 G/DL (ref 2–4)
GLUCOSE SERPL-MCNC: 95 MG/DL (ref 74–99)
HBA1C MFR BLD: 5.5 % (ref 4.2–5.6)
HDLC SERPL-MCNC: 44 MG/DL (ref 40–60)
HDLC SERPL: 4.4 {RATIO} (ref 0–5)
LDLC SERPL CALC-MCNC: 115.6 MG/DL (ref 0–100)
LIPID PROFILE,FLP: ABNORMAL
POTASSIUM SERPL-SCNC: 4.8 MMOL/L (ref 3.5–5.5)
PROT SERPL-MCNC: 6.5 G/DL (ref 6.4–8.2)
SODIUM SERPL-SCNC: 143 MMOL/L (ref 136–145)
TRIGL SERPL-MCNC: 177 MG/DL (ref ?–150)
VLDLC SERPL CALC-MCNC: 35.4 MG/DL

## 2021-10-28 PROCEDURE — 80053 COMPREHEN METABOLIC PANEL: CPT

## 2021-10-28 PROCEDURE — 77412 RADIATION TX DELIVERY LVL 3: CPT

## 2021-10-28 PROCEDURE — 77427 RADIATION TX MANAGEMENT X5: CPT | Performed by: RADIOLOGY

## 2021-10-28 PROCEDURE — 77387 GUIDANCE FOR RADJ TX DLVR: CPT

## 2021-10-28 PROCEDURE — 83036 HEMOGLOBIN GLYCOSYLATED A1C: CPT

## 2021-10-28 PROCEDURE — 36415 COLL VENOUS BLD VENIPUNCTURE: CPT

## 2021-10-28 PROCEDURE — 80061 LIPID PANEL: CPT

## 2021-10-29 ENCOUNTER — HOSPITAL ENCOUNTER (OUTPATIENT)
Dept: RADIATION THERAPY | Age: 66
Discharge: HOME | End: 2021-10-29
Payer: MEDICARE

## 2021-10-29 PROCEDURE — 77412 RADIATION TX DELIVERY LVL 3: CPT

## 2021-10-29 PROCEDURE — 77387 GUIDANCE FOR RADJ TX DLVR: CPT

## 2021-11-01 ENCOUNTER — HOSPITAL ENCOUNTER (OUTPATIENT)
Dept: RADIATION THERAPY | Age: 66
Discharge: HOME | End: 2021-11-01
Payer: MEDICARE

## 2021-11-01 PROCEDURE — 77387 GUIDANCE FOR RADJ TX DLVR: CPT

## 2021-11-01 PROCEDURE — 77412 RADIATION TX DELIVERY LVL 3: CPT

## 2021-11-02 ENCOUNTER — HOSPITAL ENCOUNTER (OUTPATIENT)
Dept: RADIATION THERAPY | Age: 66
Discharge: HOME | End: 2021-11-02
Payer: MEDICARE

## 2021-11-02 PROCEDURE — 77412 RADIATION TX DELIVERY LVL 3: CPT

## 2021-11-02 PROCEDURE — 77387 GUIDANCE FOR RADJ TX DLVR: CPT

## 2021-11-03 ENCOUNTER — HOSPITAL ENCOUNTER (OUTPATIENT)
Dept: RADIATION THERAPY | Age: 66
Discharge: HOME | End: 2021-11-03
Payer: MEDICARE

## 2021-11-03 PROCEDURE — 77336 RADIATION PHYSICS CONSULT: CPT

## 2021-11-03 PROCEDURE — 77387 GUIDANCE FOR RADJ TX DLVR: CPT

## 2021-11-03 PROCEDURE — 77412 RADIATION TX DELIVERY LVL 3: CPT

## 2021-11-04 ENCOUNTER — HOSPITAL ENCOUNTER (OUTPATIENT)
Dept: RADIATION THERAPY | Age: 66
Discharge: HOME | End: 2021-11-04
Payer: MEDICARE

## 2021-11-04 PROCEDURE — 77427 RADIATION TX MANAGEMENT X5: CPT | Performed by: RADIOLOGY

## 2021-11-04 PROCEDURE — 77412 RADIATION TX DELIVERY LVL 3: CPT

## 2021-11-04 PROCEDURE — 77387 GUIDANCE FOR RADJ TX DLVR: CPT

## 2021-11-05 ENCOUNTER — HOSPITAL ENCOUNTER (OUTPATIENT)
Dept: RADIATION THERAPY | Age: 66
Discharge: HOME | End: 2021-11-05
Payer: MEDICARE

## 2021-11-05 PROCEDURE — 77412 RADIATION TX DELIVERY LVL 3: CPT

## 2021-11-05 PROCEDURE — 77387 GUIDANCE FOR RADJ TX DLVR: CPT

## 2021-11-08 ENCOUNTER — HOSPITAL ENCOUNTER (OUTPATIENT)
Dept: RADIATION THERAPY | Age: 66
Discharge: HOME | End: 2021-11-08
Payer: MEDICARE

## 2021-11-08 PROCEDURE — 77290 THER RAD SIMULAJ FIELD CPLX: CPT

## 2021-11-08 PROCEDURE — 77412 RADIATION TX DELIVERY LVL 3: CPT

## 2021-11-09 ENCOUNTER — HOSPITAL ENCOUNTER (OUTPATIENT)
Dept: RADIATION THERAPY | Age: 66
Discharge: HOME | End: 2021-11-09
Payer: MEDICARE

## 2021-11-09 PROCEDURE — 77412 RADIATION TX DELIVERY LVL 3: CPT

## 2021-11-09 PROCEDURE — 77387 GUIDANCE FOR RADJ TX DLVR: CPT

## 2021-11-10 ENCOUNTER — HOSPITAL ENCOUNTER (OUTPATIENT)
Dept: RADIATION THERAPY | Age: 66
Discharge: HOME | End: 2021-11-10
Payer: MEDICARE

## 2021-11-10 PROCEDURE — 77336 RADIATION PHYSICS CONSULT: CPT

## 2021-11-10 PROCEDURE — 77412 RADIATION TX DELIVERY LVL 3: CPT

## 2021-11-10 PROCEDURE — 77387 GUIDANCE FOR RADJ TX DLVR: CPT

## 2021-11-11 ENCOUNTER — HOSPITAL ENCOUNTER (OUTPATIENT)
Dept: RADIATION THERAPY | Age: 66
Discharge: HOME | End: 2021-11-11
Payer: MEDICARE

## 2021-11-11 PROCEDURE — 77387 GUIDANCE FOR RADJ TX DLVR: CPT

## 2021-11-11 PROCEDURE — 77334 RADIATION TREATMENT AID(S): CPT

## 2021-11-11 PROCEDURE — 77412 RADIATION TX DELIVERY LVL 3: CPT

## 2021-11-11 PROCEDURE — 77307 TELETHX ISODOSE PLAN CPLX: CPT

## 2021-11-11 PROCEDURE — 77427 RADIATION TX MANAGEMENT X5: CPT | Performed by: RADIOLOGY

## 2021-11-12 ENCOUNTER — HOSPITAL ENCOUNTER (OUTPATIENT)
Dept: RADIATION THERAPY | Age: 66
Discharge: HOME | End: 2021-11-12
Payer: MEDICARE

## 2021-11-12 PROCEDURE — 77412 RADIATION TX DELIVERY LVL 3: CPT

## 2021-11-12 PROCEDURE — 77280 THER RAD SIMULAJ FIELD SMPL: CPT

## 2021-11-15 ENCOUNTER — HOSPITAL ENCOUNTER (OUTPATIENT)
Dept: RADIATION THERAPY | Age: 66
Discharge: HOME | End: 2021-11-15
Payer: MEDICARE

## 2021-11-15 PROCEDURE — 77387 GUIDANCE FOR RADJ TX DLVR: CPT

## 2021-11-15 PROCEDURE — 77412 RADIATION TX DELIVERY LVL 3: CPT

## 2021-11-16 ENCOUNTER — HOSPITAL ENCOUNTER (OUTPATIENT)
Dept: RADIATION THERAPY | Age: 66
Discharge: HOME | End: 2021-11-16
Payer: MEDICARE

## 2021-11-16 PROCEDURE — 77412 RADIATION TX DELIVERY LVL 3: CPT

## 2021-11-16 PROCEDURE — 77387 GUIDANCE FOR RADJ TX DLVR: CPT

## 2021-11-17 ENCOUNTER — APPOINTMENT (OUTPATIENT)
Dept: INFUSION THERAPY | Age: 66
End: 2021-11-17
Payer: MEDICARE

## 2021-11-17 ENCOUNTER — HOSPITAL ENCOUNTER (OUTPATIENT)
Dept: RADIATION THERAPY | Age: 66
Discharge: HOME | End: 2021-11-17
Payer: MEDICARE

## 2021-11-17 PROCEDURE — 77387 GUIDANCE FOR RADJ TX DLVR: CPT

## 2021-11-17 PROCEDURE — 77412 RADIATION TX DELIVERY LVL 3: CPT

## 2021-11-19 ENCOUNTER — HOSPITAL ENCOUNTER (OUTPATIENT)
Dept: INFUSION THERAPY | Age: 66
Discharge: HOME | End: 2021-11-19
Payer: MEDICARE

## 2021-11-19 VITALS
DIASTOLIC BLOOD PRESSURE: 77 MMHG | SYSTOLIC BLOOD PRESSURE: 131 MMHG | TEMPERATURE: 98.2 F | RESPIRATION RATE: 16 BRPM | OXYGEN SATURATION: 97 % | HEART RATE: 95 BPM

## 2021-11-19 PROCEDURE — 96523 IRRIG DRUG DELIVERY DEVICE: CPT

## 2021-11-19 PROCEDURE — 74011250636 HC RX REV CODE- 250/636: Performed by: INTERNAL MEDICINE

## 2021-11-19 PROCEDURE — 77030012965 HC NDL HUBR BBMI -A

## 2021-11-19 RX ORDER — SODIUM CHLORIDE 0.9 % (FLUSH) 0.9 %
10-40 SYRINGE (ML) INJECTION AS NEEDED
Status: DISCONTINUED | OUTPATIENT
Start: 2021-11-19 | End: 2021-11-23 | Stop reason: HOSPADM

## 2021-11-19 RX ORDER — HEPARIN 100 UNIT/ML
500 SYRINGE INTRAVENOUS AS NEEDED
Status: DISCONTINUED | OUTPATIENT
Start: 2021-11-19 | End: 2021-11-23 | Stop reason: HOSPADM

## 2021-11-19 RX ADMIN — Medication 30 ML: at 10:20

## 2021-11-19 RX ADMIN — HEPARIN 500 UNITS: 100 SYRINGE at 10:22

## 2021-11-19 RX ADMIN — Medication 30 ML: at 10:22

## 2021-11-19 RX ADMIN — HEPARIN 500 UNITS: 100 SYRINGE at 10:23

## 2021-11-19 NOTE — PROGRESS NOTES
[facility] Progress Note    Date: 2021    Name: Nelda Valles    MRN: [id number]         : 1955    Port Flush q 4 weeks    Ms. Robi Berg was assessed and education was provided. PT denies any complaints currently. Ms. Stanford Moore vitals were reviewed and patient was observed for 5 minutes prior to treatment. Visit Vitals  /77 (BP 1 Location: Right upper arm, BP Patient Position: Sitting)   Pulse 95   Temp 98.2 °F (36.8 °C)   Resp 16   SpO2 97%   Breastfeeding No     Dual ports both accessed per protocol, flush easily with brisk blood return obtained from medial port only. No blood return obtained from lateral port. .Lines flushed with NS and heparin and de-accessed per protocol. Band aids applied. Ms. Robi Berg had no complaints. Patient armband removed and shredded. Ms. Robi Berg was discharged from [place] [address] in stable condition at 1035. She is to return on 12/15/2021 for her next appointment.     Елена Mckeon RN  2021  12:49 PM

## 2021-12-01 ENCOUNTER — HOSPITAL ENCOUNTER (OUTPATIENT)
Dept: LAB | Age: 66
Discharge: HOME | End: 2021-12-01
Payer: MEDICARE

## 2021-12-01 ENCOUNTER — LAB ONLY (OUTPATIENT)
Dept: ONCOLOGY | Age: 66
End: 2021-12-01

## 2021-12-01 DIAGNOSIS — D64.9 NORMOCYTIC ANEMIA: ICD-10-CM

## 2021-12-01 DIAGNOSIS — Z17.1 BREAST CANCER, STAGE 1, ESTROGEN RECEPTOR NEGATIVE, LEFT (HCC): ICD-10-CM

## 2021-12-01 DIAGNOSIS — C50.919 TRIPLE NEGATIVE MALIGNANT NEOPLASM OF BREAST (HCC): ICD-10-CM

## 2021-12-01 DIAGNOSIS — C50.912 BREAST CANCER, STAGE 1, ESTROGEN RECEPTOR NEGATIVE, LEFT (HCC): ICD-10-CM

## 2021-12-01 DIAGNOSIS — Z17.1 BREAST CANCER, STAGE 1, ESTROGEN RECEPTOR NEGATIVE, LEFT (HCC): Primary | ICD-10-CM

## 2021-12-01 DIAGNOSIS — C50.912 BREAST CANCER, STAGE 1, ESTROGEN RECEPTOR NEGATIVE, LEFT (HCC): Primary | ICD-10-CM

## 2021-12-01 LAB
ALBUMIN SERPL-MCNC: 4 G/DL (ref 3.4–5)
ALBUMIN/GLOB SERPL: 1.3 {RATIO} (ref 0.8–1.7)
ALP SERPL-CCNC: 106 U/L (ref 45–117)
ALT SERPL-CCNC: 30 U/L (ref 13–56)
ANION GAP SERPL CALC-SCNC: 5 MMOL/L (ref 3–18)
AST SERPL-CCNC: 27 U/L (ref 10–38)
BASOPHILS # BLD: 0 K/UL (ref 0–0.1)
BASOPHILS NFR BLD: 1 % (ref 0–2)
BILIRUB SERPL-MCNC: 0.3 MG/DL (ref 0.2–1)
BUN SERPL-MCNC: 22 MG/DL (ref 7–18)
BUN/CREAT SERPL: 22 (ref 12–20)
CALCIUM SERPL-MCNC: 10.5 MG/DL (ref 8.5–10.1)
CHLORIDE SERPL-SCNC: 109 MMOL/L (ref 100–111)
CO2 SERPL-SCNC: 29 MMOL/L (ref 21–32)
CREAT SERPL-MCNC: 1.02 MG/DL (ref 0.6–1.3)
DIFFERENTIAL METHOD BLD: ABNORMAL
EOSINOPHIL # BLD: 0.4 K/UL (ref 0–0.4)
EOSINOPHIL NFR BLD: 9 % (ref 0–5)
ERYTHROCYTE [DISTWIDTH] IN BLOOD BY AUTOMATED COUNT: 13.5 % (ref 11.6–14.5)
GLOBULIN SER CALC-MCNC: 3.1 G/DL (ref 2–4)
GLUCOSE SERPL-MCNC: 130 MG/DL (ref 74–99)
HCT VFR BLD AUTO: 41.8 % (ref 35–45)
HGB BLD-MCNC: 13 G/DL (ref 12–16)
IMM GRANULOCYTES # BLD AUTO: 0 K/UL (ref 0–0.04)
IMM GRANULOCYTES NFR BLD AUTO: 0 % (ref 0–0.5)
LYMPHOCYTES # BLD: 0.9 K/UL (ref 0.9–3.6)
LYMPHOCYTES NFR BLD: 24 % (ref 21–52)
MCH RBC QN AUTO: 31.2 PG (ref 24–34)
MCHC RBC AUTO-ENTMCNC: 31.1 G/DL (ref 31–37)
MCV RBC AUTO: 100.2 FL (ref 78–100)
MONOCYTES # BLD: 0.5 K/UL (ref 0.05–1.2)
MONOCYTES NFR BLD: 12 % (ref 3–10)
NEUTS SEG # BLD: 2.2 K/UL (ref 1.8–8)
NEUTS SEG NFR BLD: 54 % (ref 40–73)
NRBC # BLD: 0 K/UL (ref 0–0.01)
NRBC BLD-RTO: 0 PER 100 WBC
PLATELET # BLD AUTO: 288 K/UL (ref 135–420)
PMV BLD AUTO: 9.2 FL (ref 9.2–11.8)
POTASSIUM SERPL-SCNC: 4.5 MMOL/L (ref 3.5–5.5)
PROT SERPL-MCNC: 7.1 G/DL (ref 6.4–8.2)
RBC # BLD AUTO: 4.17 M/UL (ref 4.2–5.3)
SODIUM SERPL-SCNC: 143 MMOL/L (ref 136–145)
WBC # BLD AUTO: 4 K/UL (ref 4.6–13.2)

## 2021-12-01 PROCEDURE — 36415 COLL VENOUS BLD VENIPUNCTURE: CPT

## 2021-12-01 PROCEDURE — 86300 IMMUNOASSAY TUMOR CA 15-3: CPT

## 2021-12-01 PROCEDURE — 85025 COMPLETE CBC W/AUTO DIFF WBC: CPT

## 2021-12-01 PROCEDURE — 80053 COMPREHEN METABOLIC PANEL: CPT

## 2021-12-02 LAB — CANCER AG27-29 SERPL-ACNC: 18.9 U/ML (ref 0–38.6)

## 2021-12-07 ENCOUNTER — OFFICE VISIT (OUTPATIENT)
Dept: SURGERY | Age: 66
End: 2021-12-07
Payer: MEDICARE

## 2021-12-07 VITALS
HEIGHT: 64 IN | BODY MASS INDEX: 22.53 KG/M2 | OXYGEN SATURATION: 98 % | SYSTOLIC BLOOD PRESSURE: 120 MMHG | WEIGHT: 132 LBS | TEMPERATURE: 98.8 F | RESPIRATION RATE: 15 BRPM | HEART RATE: 89 BPM | DIASTOLIC BLOOD PRESSURE: 68 MMHG

## 2021-12-07 DIAGNOSIS — Z12.39 BREAST CANCER SCREENING OTHER THAN MAMMOGRAM: Primary | ICD-10-CM

## 2021-12-07 PROCEDURE — 99213 OFFICE O/P EST LOW 20 MIN: CPT | Performed by: SURGERY

## 2021-12-07 PROCEDURE — G8752 SYS BP LESS 140: HCPCS | Performed by: SURGERY

## 2021-12-07 PROCEDURE — G8427 DOCREV CUR MEDS BY ELIG CLIN: HCPCS | Performed by: SURGERY

## 2021-12-07 PROCEDURE — G9899 SCRN MAM PERF RSLTS DOC: HCPCS | Performed by: SURGERY

## 2021-12-07 PROCEDURE — 3017F COLORECTAL CA SCREEN DOC REV: CPT | Performed by: SURGERY

## 2021-12-07 PROCEDURE — G8432 DEP SCR NOT DOC, RNG: HCPCS | Performed by: SURGERY

## 2021-12-07 PROCEDURE — 1101F PT FALLS ASSESS-DOCD LE1/YR: CPT | Performed by: SURGERY

## 2021-12-07 PROCEDURE — G8399 PT W/DXA RESULTS DOCUMENT: HCPCS | Performed by: SURGERY

## 2021-12-07 PROCEDURE — 1090F PRES/ABSN URINE INCON ASSESS: CPT | Performed by: SURGERY

## 2021-12-07 PROCEDURE — G8420 CALC BMI NORM PARAMETERS: HCPCS | Performed by: SURGERY

## 2021-12-07 PROCEDURE — G8536 NO DOC ELDER MAL SCRN: HCPCS | Performed by: SURGERY

## 2021-12-07 PROCEDURE — G8754 DIAS BP LESS 90: HCPCS | Performed by: SURGERY

## 2021-12-07 NOTE — PATIENT INSTRUCTIONS

## 2021-12-07 NOTE — PROGRESS NOTES
[facility]  General Surgery    Subjective:  Stage I triple negative left breast cancer status post lumpectomy and sentinel lymph node biopsy with adjuvant chemotherapy and radiation therapy. She has been performing self breast exam.  She denies any issues. She does have some numbness and tingling in her feet. She has no issues with her gait. Objective:  Vitals:    12/07/21 0900   BP: 120/68   Pulse: 89   Resp: 15   Temp: 98.8 °F (37.1 °C)   TempSrc: Oral   SpO2: 98%   Weight: 59.9 kg (132 lb)   Height: 5' 4\" (1.626 m)       Physical Exam:    General: Awake and alert, oriented x4, no apparent distress   Breasts:    Left: No dimpling, discoloration, nipple inversion or retractions. No axillary or supraclavicular lymphadenopathy. No mass   Right: No dimpling, discoloration, nipple inversion or retractions. No axillary or supraclavicular lymphadenopathy. No mass    Current Medications:  Current Outpatient Medications   Medication Sig Dispense Refill    b complex-vitamin c-folic acid 0.8 mg (NEPHRO-CHOCO) 0.8 mg tab tablet Take 1 Tablet by mouth daily. Indications: vitamin deficiency      omega-3 acid ethyl esters (LOVAZA) 1 gram capsule Take 2 g by mouth two (2) times a day.  OTHER Liquid iron      prednisoLONE acetate (PRED FORTE) 1 % ophthalmic suspension       zinc 50 mg tab tablet Take  by mouth daily.  ascorbic acid, vitamin C, (Vitamin C) 500 mg tablet Take  by mouth.  fenofibrate nanocrystallized (TRICOR) 145 mg tablet Take 1 Tablet by mouth daily. 90 Tablet 1    lidocaine-prilocaine (EMLA) topical cream Apply  to affected area as needed (30-60 minutes before port is to be accessed). 30 g 1    calcium citrate-vitamin d3 (CITRACAL+D) 315 mg-5 mcg (200 unit) tab Take 1 Tablet by mouth daily (with breakfast).  pilocarpine (PILOCAR) 1 % ophthalmic solution Administer 1 Drop to both eyes nightly.       telmisartan (MICARDIS) 20 mg tablet Take 1 Tab by mouth daily. 90 Tab 4    coenzyme q10 (CO Q-10) 10 mg cap Take 100 mg by mouth.  multivitamin (ONE A DAY) tablet Take 1 Tab by mouth daily.  aspirin 81 mg tablet Take 81 mg by mouth.  dexAMETHasone (DECADRON) 4 mg tablet Take 8 mg by mouth two (2) times daily (with meals). Please take 8 mg twice daily for three days, starting one day prior to docetaxel administration, (the day before treatment, day of treatment and day after) (Patient not taking: Reported on 12/7/2021) 36 Tablet 0    ondansetron (ZOFRAN ODT) 8 mg disintegrating tablet Take 1 Tablet by mouth every eight (8) hours as needed for Nausea or Vomiting. Indications: nausea and vomiting caused by cancer drugs (Patient not taking: Reported on 11/1/2021) 30 Tablet 0       Chart and notes reviewed. Data reviewed. I have evaluated and examined the patient. Impression and plan:  Patient with stage I triple negative breast cancer status post left lumpectomy with sentinel lymph node biopsy and adjuvant chemotherapy and radiation therapy. Follow-up in June after mammography is completed. Continue monthly self breast exam  Please call or visit with any questions or concerns.        Fidel Thomas MD

## 2021-12-15 ENCOUNTER — OFFICE VISIT (OUTPATIENT)
Dept: ONCOLOGY | Age: 66
End: 2021-12-15
Payer: MEDICARE

## 2021-12-15 ENCOUNTER — HOSPITAL ENCOUNTER (OUTPATIENT)
Dept: INFUSION THERAPY | Age: 66
Discharge: HOME | End: 2021-12-15
Payer: MEDICARE

## 2021-12-15 VITALS
HEIGHT: 64 IN | DIASTOLIC BLOOD PRESSURE: 73 MMHG | OXYGEN SATURATION: 96 % | SYSTOLIC BLOOD PRESSURE: 129 MMHG | WEIGHT: 132 LBS | HEART RATE: 98 BPM | BODY MASS INDEX: 22.53 KG/M2

## 2021-12-15 DIAGNOSIS — Z17.1 BREAST CANCER, STAGE 1, ESTROGEN RECEPTOR NEGATIVE, LEFT (HCC): ICD-10-CM

## 2021-12-15 DIAGNOSIS — R91.1 SOLITARY PULMONARY NODULE: ICD-10-CM

## 2021-12-15 DIAGNOSIS — C50.919 TRIPLE NEGATIVE MALIGNANT NEOPLASM OF BREAST (HCC): Primary | ICD-10-CM

## 2021-12-15 DIAGNOSIS — C50.912 BREAST CANCER, STAGE 1, ESTROGEN RECEPTOR NEGATIVE, LEFT (HCC): ICD-10-CM

## 2021-12-15 DIAGNOSIS — D64.9 NORMOCYTIC ANEMIA: ICD-10-CM

## 2021-12-15 PROCEDURE — 3017F COLORECTAL CA SCREEN DOC REV: CPT | Performed by: INTERNAL MEDICINE

## 2021-12-15 PROCEDURE — G8399 PT W/DXA RESULTS DOCUMENT: HCPCS | Performed by: INTERNAL MEDICINE

## 2021-12-15 PROCEDURE — 99214 OFFICE O/P EST MOD 30 MIN: CPT | Performed by: INTERNAL MEDICINE

## 2021-12-15 PROCEDURE — G8752 SYS BP LESS 140: HCPCS | Performed by: INTERNAL MEDICINE

## 2021-12-15 PROCEDURE — 1101F PT FALLS ASSESS-DOCD LE1/YR: CPT | Performed by: INTERNAL MEDICINE

## 2021-12-15 PROCEDURE — G8432 DEP SCR NOT DOC, RNG: HCPCS | Performed by: INTERNAL MEDICINE

## 2021-12-15 PROCEDURE — 77030012965 HC NDL HUBR BBMI -A

## 2021-12-15 PROCEDURE — G8536 NO DOC ELDER MAL SCRN: HCPCS | Performed by: INTERNAL MEDICINE

## 2021-12-15 PROCEDURE — 96523 IRRIG DRUG DELIVERY DEVICE: CPT

## 2021-12-15 PROCEDURE — G8754 DIAS BP LESS 90: HCPCS | Performed by: INTERNAL MEDICINE

## 2021-12-15 PROCEDURE — G8427 DOCREV CUR MEDS BY ELIG CLIN: HCPCS | Performed by: INTERNAL MEDICINE

## 2021-12-15 PROCEDURE — G9899 SCRN MAM PERF RSLTS DOC: HCPCS | Performed by: INTERNAL MEDICINE

## 2021-12-15 PROCEDURE — G8420 CALC BMI NORM PARAMETERS: HCPCS | Performed by: INTERNAL MEDICINE

## 2021-12-15 PROCEDURE — G0463 HOSPITAL OUTPT CLINIC VISIT: HCPCS | Performed by: INTERNAL MEDICINE

## 2021-12-15 PROCEDURE — 74011250636 HC RX REV CODE- 250/636: Performed by: INTERNAL MEDICINE

## 2021-12-15 PROCEDURE — 1090F PRES/ABSN URINE INCON ASSESS: CPT | Performed by: INTERNAL MEDICINE

## 2021-12-15 RX ORDER — SODIUM CHLORIDE 0.9 % (FLUSH) 0.9 %
10-40 SYRINGE (ML) INJECTION AS NEEDED
Status: DISCONTINUED | OUTPATIENT
Start: 2021-12-15 | End: 2021-12-19 | Stop reason: HOSPADM

## 2021-12-15 RX ORDER — HEPARIN 100 UNIT/ML
500 SYRINGE INTRAVENOUS ONCE
Status: DISPENSED | OUTPATIENT
Start: 2021-12-15 | End: 2021-12-15

## 2021-12-15 RX ORDER — HEPARIN 100 UNIT/ML
500 SYRINGE INTRAVENOUS ONCE
Status: COMPLETED | OUTPATIENT
Start: 2021-12-15 | End: 2021-12-15

## 2021-12-15 RX ADMIN — Medication 20 ML: at 10:40

## 2021-12-15 RX ADMIN — HEPARIN 500 UNITS: 100 SYRINGE at 10:40

## 2021-12-15 NOTE — PROGRESS NOTES
Hematology/Medical Oncology Progress Note      Name: Lizeth Finch  : 1955  DATE: 12/15/21    PCP: Jair Sloan MD    Oncology History  Ms. Sushma Condon is a most pleasant 78y.o. year old female who was seen for consultation of triple negative left breast cancer. -- The patient has a past medical history significant of hypertension, hypercholesterolemia, obstructive sleep apnea. -- Postmenopausal,  2, Para 2. Menarche at 14. First birth at 24 and menopause at 63.  -- 21 Screening mammogram reported possible posterior lateral left breast mass. -- 21 Diagnostic mammogram reported left breast oval, heterogeneously hypoechoic mass with microlobulated margins at the 3:30 position, 8 cfn, measuring 0.8 x 0.7 x 0.3 cm.  -- 21 Biopsy reported invasive ductal carcinoma, grade 3, ER/IA negative, HER2 negative. -- 21 MRI breast reported left breast mass at 3:30, 0.7 cm malignant mass (correlating to biopsied mass). -- 06/10/21 S.p Tag localized lumpectomy left breast with left sentinel lymph node biopsy with ultrasound-guided right internal jugular Mediport placement. Lumpectomy with SLNBx reported invasive ductal adenocarcinoma, grade 3, 5 mm, 0/2 lymph nodes involved, and negative margins (9 mm posterior). pT1bN0  -- Given her high graded histology and TNBC we have suggested adjuvantly chemotherapy. For those patients with node-negative, T <1 cm, we have recommended non-anthracycline-based regimens to avoid the risks associated with anthracyclines.   --2021 C1 Adjuvant TC  --2021 C2 Adjuvant TC  --2021 C3 Adjuvant TC  --2021 C4 Adjuvant TC    2021: Completed Radiation Therapy    Subjective:   Ms. Lizeth Finch is a 78 y.o. female who was seen for management of her TNBC. She states she completed her Radiation Therapy in November and she has tolerated it well. She is accompanied today by her .  She reports her hair is growing back and her nails are looking better. She denies any fevers, chills, recurrent infections, and skin rash. She denies shortness of breath, chest pains, nausea, vomiting, constipation, and diarrhea. She denies bleeding or bruising. She denies cough, back pain, focal numbness or weakness. She denies pain or any discomfort. She does not have any concerns or complaints to report at this time.     Past Medical History, Family History, and Social History reviewed and remain unchanged:    Past Medical History:   Diagnosis Date    Cancer ([address]     left breast    High cholesterol     Hypertension     Sleep apnea     patient denies     Past Surgical History:   Procedure Laterality Date    HX BLADDER SUSPENSION      HX BREAST BIOPSY Left 6/10/2021    TAG LOCALIZED LUMPECTOMY LEFT BREAST WITH LEFT AXILLARY SENTINEL LYMPH NODE BIOPSY (TAG 6.7.21 HBV 1300; SLNB 6.9.21 [facility] 1030) performed by Nafisa Garza MD at [address] [place] [address] HX GYN      hysterectomy    HX HEENT Bilateral 05/13/2021    Bilateral Laser surgery: Anatomical narrow angle, bilateral    HX OOPHORECTOMY Left     SD BREAST SURGERY PROCEDURE UNLISTED  2000    right lumpectomy     Social History     Socioeconomic History    Marital status:      Spouse name: Not on file    Number of children: Not on file    Years of education: Not on file    Highest education level: Not on file   Occupational History    Not on file   Tobacco Use    Smoking status: Never Smoker    Smokeless tobacco: Never Used   Vaping Use    Vaping Use: Never used   Substance and Sexual Activity    Alcohol use: Yes     Comment: rare    Drug use: Never    Sexual activity: Not Currently     Partners: Male     Birth control/protection: None   Other Topics Concern    Not on file   Social History Narrative    Not on file     Social Determinants of Health     Financial Resource Strain:     Difficulty of Paying Living Expenses: Not on file   Food Insecurity:     Worried About Running Out of [employer] in the Last Year: Not on file    Ran Out of Food in the Last Year: Not on file   Transportation Needs:     Lack of Transportation (Medical): Not on file    Lack of Transportation (Non-Medical): Not on file   Physical Activity:     Days of Exercise per Week: Not on file    Minutes of Exercise per Session: Not on file   Stress:     Feeling of Stress : Not on file   Social Connections:     Frequency of Communication with Friends and Family: Not on file    Frequency of Social Gatherings with Friends and Family: Not on file    Attends Gnosticist Services: Not on file    Active Member of [address] [place] [address] or Organizations: Not on file    Attends Club or Organization Meetings: Not on file    Marital Status: Not on file   Intimate Partner Violence:     Fear of Current or Ex-Partner: Not on file    Emotionally Abused: Not on file    Physically Abused: Not on file    Sexually Abused: Not on file   Housing Stability:     Unable to Pay for Housing in the Last Year: Not on file    Number of [place] in the Last Year: Not on file    Unstable Housing in the Last Year: Not on file     Family History   Problem Relation Age of Onset    Dementia Mother     Hypertension Mother     High Cholesterol Mother     Other Mother         pre-dm    No Known Problems Son     No Known Problems Son     Other Father         homicide    Other Sister         AIDS    No Known Problems Brother     COPD Sister     No Known Problems Sister      Current Outpatient Medications   Medication Sig Dispense Refill    COVID-19 vacc, mRNA,Pfizer,/PF (PFIZER COVID-19 VACCINE, EUA, IM)       b complex-vitamin c-folic acid 0.8 mg (NEPHRO-CHOCO) 0.8 mg tab tablet Take 1 Tablet by mouth daily. Indications: vitamin deficiency      omega-3 acid ethyl esters (LOVAZA) 1 gram capsule Take 2 g by mouth two (2) times a day.  OTHER Liquid iron      dexAMETHasone (DECADRON) 4 mg tablet Take 8 mg by mouth two (2) times daily (with meals).  Please take 8 mg twice daily for three days, starting one day prior to docetaxel administration, (the day before treatment, day of treatment and day after) (Patient not taking: Reported on 12/7/2021) 36 Tablet 0    prednisoLONE acetate (PRED FORTE) 1 % ophthalmic suspension       zinc 50 mg tab tablet Take  by mouth daily.  ascorbic acid, vitamin C, (Vitamin C) 500 mg tablet Take  by mouth.  fenofibrate nanocrystallized (TRICOR) 145 mg tablet Take 1 Tablet by mouth daily. 90 Tablet 1    ondansetron (ZOFRAN ODT) 8 mg disintegrating tablet Take 1 Tablet by mouth every eight (8) hours as needed for Nausea or Vomiting. Indications: nausea and vomiting caused by cancer drugs (Patient not taking: Reported on 11/1/2021) 30 Tablet 0    lidocaine-prilocaine (EMLA) topical cream Apply  to affected area as needed (30-60 minutes before port is to be accessed). 30 g 1    calcium citrate-vitamin d3 (CITRACAL+D) 315 mg-5 mcg (200 unit) tab Take 1 Tablet by mouth daily (with breakfast).  pilocarpine (PILOCAR) 1 % ophthalmic solution Administer 1 Drop to both eyes nightly.  telmisartan (MICARDIS) 20 mg tablet Take 1 Tab by mouth daily. 90 Tab 4    coenzyme q10 (CO Q-10) 10 mg cap Take 100 mg by mouth.  multivitamin (ONE A DAY) tablet Take 1 Tab by mouth daily.  aspirin 81 mg tablet Take 81 mg by mouth. Review of Systems   Constitutional: Negative for chills, diaphoresis, fever, malaise/fatigue and weight loss. Respiratory: Negative for cough, hemoptysis, shortness of breath and wheezing. Cardiovascular: Negative for chest pain, palpitations and leg swelling. Gastrointestinal: Negative for abdominal pain, diarrhea, heartburn, nausea and vomiting. Genitourinary: Negative for dysuria, frequency, hematuria and urgency. Musculoskeletal: Negative for joint pain and myalgias. Skin: Negative for itching and rash. Neurological: Negative for dizziness, seizures, weakness and headaches. Psychiatric/Behavioral: Negative for depression. The patient does not have insomnia. Objective:     Visit Vitals  /73 (BP 1 Location: Right upper arm, BP Patient Position: Sitting)   Pulse 98   Ht 5' 4\" (1.626 m)   Wt 59.9 kg (132 lb)   SpO2 96%   BMI 22.66 kg/m²       ECOG Performance Status (grade): 0   0 - able to carry on all pre-disease activity w/out restriction  1 - restricted but able to carry out light work  2 - ambulatory and can self- care but unable to carry out work  3 - bed or chair >50% of waking hours  4 - completely disable, total care, confined to bed or chair    Physical Exam  Constitutional:       Appearance: Normal appearance. HENT:      Head: Normocephalic and atraumatic. Eyes:      Pupils: Pupils are equal, round, and reactive to light. Cardiovascular:      Rate and Rhythm: Normal rate and regular rhythm. Heart sounds: Normal heart sounds. Pulmonary:      Effort: Pulmonary effort is normal.      Breath sounds: Normal breath sounds. Abdominal:      General: Bowel sounds are normal.      Palpations: Abdomen is soft. Tenderness: There is no abdominal tenderness. There is no guarding. Musculoskeletal:         General: Normal range of motion. Cervical back: Neck supple. Right lower leg: No edema. Left lower leg: No edema. Skin:     General: Skin is warm. Neurological:      General: No focal deficit present. Mental Status: She is alert and oriented to person, place, and time. Mental status is at baseline. Diagnostics:      No results found for this or any previous visit (from the past 96 hour(s)). Imaging:  No results found for this or any previous visit. Results for orders placed during the hospital encounter of 06/10/21    XR CHEST PORT    Narrative  ONE VIEW CHEST RADIOGRAPH    INDICATION: post op. Status post left breast lumpectomy and sentinel lymph node  biopsy with right Mediport placement.     COMPARISON: Chest radiograph 5/14/2021. TECHNIQUE: AP view of the chest    FINDINGS:    LINES/TUBES: Right chest wall Mediport tip is in the SVC. MEDIASTINUM: Cardiac silhouette is within normal limits. LUNGS: Low lung volumes. No pneumothorax. Minimal left basilar atelectasis. No  evidence for pneumonia. No pleural effusion. BONES/OTHER: No acute osseous abnormality. Surgical staples and subcutaneous air  left breast and axilla consistent with recent surgery. Impression  Right chest wall Mediport tip in the SVC. No pneumothorax. Results for orders placed during the hospital encounter of 12/23/12    CT SINUSES WO CONT    Narrative  CT paranasal sinuses    CPT CODE: 44083    HISTORY: Headache and malaise    COMPARISON: None. TECHNIQUE:  Volumetric axial scans of the paranasal sinuses were performed. Sagittal and coronal reformations were performed to improve demonstration of  orbital meatal complexes and nasal drainage passageways. FINDINGS:    The nasal septum demonstrates mild right-to-left deviation. The bilateral nasal  cavities appear patent. The right inferior turbinate is hypertrophied. The  paranasal sinuses appear patent including bilateral frontal, ethmoidal,  sphenoidal and maxillary sinuses. The right middle turbinate is enlarged and  aerated. The bilateral osteomeatal complexes appear patent. No evidence of  soft tissue density, mucosal thickening or air fluid level identified. Visualized portion of facial bones and both orbits appear unremarkable. Impression  :    No evidence for sinusitis. Hypertrophy inferior nasal turbinate - allergic rhinitis. XR Results (most recent):  Results from Hospital Encounter encounter on 06/10/21    XR CHEST PORT    Narrative  ONE VIEW CHEST RADIOGRAPH    INDICATION: post op. Status post left breast lumpectomy and sentinel lymph node  biopsy with right Mediport placement. COMPARISON: Chest radiograph 5/14/2021.     TECHNIQUE: AP view of the chest    FINDINGS:    LINES/TUBES: Right chest wall Mediport tip is in the SVC. MEDIASTINUM: Cardiac silhouette is within normal limits. LUNGS: Low lung volumes. No pneumothorax. Minimal left basilar atelectasis. No  evidence for pneumonia. No pleural effusion. BONES/OTHER: No acute osseous abnormality. Surgical staples and subcutaneous air  left breast and axilla consistent with recent surgery. Impression  Right chest wall Mediport tip in the SVC. No pneumothorax. Pathology  5/4/2021     LEFT BREAST, 3:30, 8 CM FROM NIPPLE, BIOPSY:   INVASIVE DUCTAL CARCINOMA. INVASIVE CARCINOMA OF THE BREAST: Biopsy   SPECIMEN   Procedure: Needle biopsy   Specimen Laterality: Left   TUMOR   Tumor Site: Clock position - 3 o'clock   Histologic Type: Invasive carcinoma of no special type (ductal)   Glandular (Acinar) / Tubular Differentiation: Score 2   Nuclear Pleomorphism: Score 3   Mitotic Rate: Score 3   Overall Grade: Grade 3 (scores of 8 or 9)   Tumor Size: 6 mm   Ductal Carcinoma In Situ (DCIS): Not identified   Lymphovascular Invasion: Not identified   Microcalcifications: Not identified     ER/MN Results:   Estrogen Receptor (ER): Negative (0%, Internal controls present and stain as expected). Progesterone Receptor (PgR): Negative (0%, Internal controls present and stain as expected). Her-2 (IHC Method): Negative (score 1+)         Assessment:                                      Left breast cancer, Triple negative invasive ductal adenocarcinoma  Normocytic Anemia    Plan:                                        Left breast cancer, Triple negative invasive ductal adenocarcinoma   -- Diagnosed 6/24/2021 Stage IB, T1b, pN0, M0, G3, HER2 Neg, ER Neg, MN N  -- Left triple negative invasive ductal adenocarcinoma s/p lumpectomy with SLNBx (pT1bN0, grade 3, 0/2 lymph nodes involved, negative margins)  -- 03/23/21 Screening mammogram reported possible posterior lateral left breast mass.   -- 04/27/21 Diagnostic mammogram reported left breast oval, heterogeneously hypoechoic mass  with microlobulated margins at the 3:30 position, 8 cfn, measuring 0.8 x 0.7 x 0.3 cm.  -- 05/04/21 Biopsy reported invasive ductal carcinoma, grade 3, ER/KS negative, HER2 negative. -- 05/25/21 MRI breast reported left breast mass at 3:30, 0.7 cm malignant mass (correlating to biopsied mass). -- 06/10/21 S.p Tag localized lumpectomy left breast with left sentinel lymph node biopsy with ultrasound-guided right internal jugular Mediport placement. Lumpectomy with SLNBx reported invasive ductal adenocarcinoma, grade 3, 5 mm, 0/2 lymph nodes involved, and negative margins (9 mm posterior). pT1bN0  -- Given her high graded histology and TNBC we have suggested adjuvantly chemotherapy. For those patients with node-negative, T <1 cm, we have recommended non-anthracycline-based regimens to avoid the risks associated with anthracyclines.   --07/16/2021 C1 Adjuvant TC  --08/06/2021 C2 Adjuvant TC  --08/27/2021 C3 Adjuvant TC  --09/17/2021 C4 Adjuvant TC  --11/17/2021: Completed Radiation Therapy  --12/01/2021 CA27-29 normal.  -- Clinical stable. Recent lab report was reviewed with patient today. Plan:  -- 5/2021 CXR reported nonspecific small asymmetric density at the left upper lung. We will obtain CT Chest with contrast for further evaluation. -- The patient was instructed to continue doing her breast exams on a monthly basis. -- The patient was advised to notify us if any new breast pain, new palpable nodule, nipple inversion, spontaneous nipple discharge especially if bloody, breast skin changes, unintentional weight loss, worsen fatigue, new bone pain or back pain, or any concerns. -- I have advised her to follow up her PCP to continue age-appropriate cancer screening.  -- The patient will continue follows up with mammogram as recommended. Normocytic Anemia  --12/01/2021: CBC showed WBC 4.0K/uL, hemoglobin 13g/dL with hematocrit of 41.8%. Platelet 896  --Repeat labs in 3 months including Iron Profile and Ferritin level. Orders Placed This Encounter    CT CHEST W CONT     Standing Status:   Future     Standing Expiration Date:   1/15/2023     Order Specific Question:   STAT Creatinine as indicated     Answer: Yes    CBC WITH AUTOMATED DIFF     Standing Status:   Future     Standing Expiration Date:   12/16/2022    FERRITIN     Standing Status:   Future     Standing Expiration Date:   12/16/2022    IRON PROFILE     Standing Status:   Future     Standing Expiration Date:   09/35/1133    METABOLIC PANEL, COMPREHENSIVE     Standing Status:   Future     Standing Expiration Date:   12/16/2022    CA 27.29     Standing Status:   Future     Standing Expiration Date:   12/16/2022          Markel Nunes MD  12/15/2021      CC: Danni Hall MD; Mery Alvarado M.D.; Alona Harris M.D.

## 2021-12-15 NOTE — PROGRESS NOTES
[facility] Progress Note    Date: December 15, 2021    Name: Michelle Ray    MRN: [id number]         : 1955    Port Flush q 4 weeks    Ms. Meche White was assessed and education was provided. Ms. Roman Majano vitals were reviewed. Dual ports both accessed per protocol, flush easily with brisk blood return obtained from lateral port only. No blood return obtained from medial port. Lateral flushed with NS and heparin per protocol and both de-accessed. Band aids applied. Ms. Meche White had no complaints. Patient armband removed and shredded. Ms. Meche White was discharged from [place] [address] in stable condition at 1045. She is to return on 2022 for her next appointment.     Arpit Clark RN  December 15, 2021

## 2021-12-17 ENCOUNTER — APPOINTMENT (OUTPATIENT)
Dept: INFUSION THERAPY | Age: 66
End: 2021-12-17
Payer: MEDICARE

## 2022-01-04 DIAGNOSIS — E78.5 HYPERLIPIDEMIA, UNSPECIFIED HYPERLIPIDEMIA TYPE: ICD-10-CM

## 2022-01-06 ENCOUNTER — HOSPITAL ENCOUNTER (OUTPATIENT)
Dept: RADIATION THERAPY | Age: 67
Discharge: HOME | End: 2022-01-06
Payer: MEDICARE

## 2022-01-06 PROCEDURE — 99211 OFF/OP EST MAY X REQ PHY/QHP: CPT

## 2022-01-06 PROCEDURE — 99024 POSTOP FOLLOW-UP VISIT: CPT | Performed by: RADIOLOGY

## 2022-01-06 RX ORDER — FENOFIBRATE 145 MG/1
TABLET, COATED ORAL
Qty: 90 TABLET | Refills: 3 | Status: SHIPPED | OUTPATIENT
Start: 2022-01-06

## 2022-01-18 ENCOUNTER — HOSPITAL ENCOUNTER (OUTPATIENT)
Dept: LAB | Age: 67
Discharge: HOME | End: 2022-01-18
Payer: MEDICARE

## 2022-01-18 DIAGNOSIS — E78.5 HYPERLIPIDEMIA, UNSPECIFIED HYPERLIPIDEMIA TYPE: ICD-10-CM

## 2022-01-18 DIAGNOSIS — R73.01 IFG (IMPAIRED FASTING GLUCOSE): ICD-10-CM

## 2022-01-18 DIAGNOSIS — I10 ESSENTIAL HYPERTENSION: ICD-10-CM

## 2022-01-18 LAB
ALBUMIN SERPL-MCNC: 4 G/DL (ref 3.4–5)
ALBUMIN/GLOB SERPL: 1.3 {RATIO} (ref 0.8–1.7)
ALP SERPL-CCNC: 116 U/L (ref 45–117)
ALT SERPL-CCNC: 30 U/L (ref 13–56)
ANION GAP SERPL CALC-SCNC: 4 MMOL/L (ref 3–18)
AST SERPL-CCNC: 23 U/L (ref 10–38)
BILIRUB SERPL-MCNC: 0.4 MG/DL (ref 0.2–1)
BUN SERPL-MCNC: 22 MG/DL (ref 7–18)
BUN/CREAT SERPL: 21 (ref 12–20)
CALCIUM SERPL-MCNC: 10.1 MG/DL (ref 8.5–10.1)
CHLORIDE SERPL-SCNC: 110 MMOL/L (ref 100–111)
CHOLEST SERPL-MCNC: 215 MG/DL
CO2 SERPL-SCNC: 27 MMOL/L (ref 21–32)
CREAT SERPL-MCNC: 1.07 MG/DL (ref 0.6–1.3)
EST. AVERAGE GLUCOSE BLD GHB EST-MCNC: 134 MG/DL
GLOBULIN SER CALC-MCNC: 3.1 G/DL (ref 2–4)
GLUCOSE SERPL-MCNC: 105 MG/DL (ref 74–99)
HBA1C MFR BLD: 6.3 % (ref 4.2–5.6)
HDLC SERPL-MCNC: 48 MG/DL (ref 40–60)
HDLC SERPL: 4.5 {RATIO} (ref 0–5)
LDLC SERPL CALC-MCNC: 127.4 MG/DL (ref 0–100)
LIPID PROFILE,FLP: ABNORMAL
POTASSIUM SERPL-SCNC: 4.7 MMOL/L (ref 3.5–5.5)
PROT SERPL-MCNC: 7.1 G/DL (ref 6.4–8.2)
SODIUM SERPL-SCNC: 141 MMOL/L (ref 136–145)
TRIGL SERPL-MCNC: 198 MG/DL (ref ?–150)
VLDLC SERPL CALC-MCNC: 39.6 MG/DL

## 2022-01-18 PROCEDURE — 36415 COLL VENOUS BLD VENIPUNCTURE: CPT

## 2022-01-18 PROCEDURE — 80053 COMPREHEN METABOLIC PANEL: CPT

## 2022-01-18 PROCEDURE — 80061 LIPID PANEL: CPT

## 2022-01-18 PROCEDURE — 83036 HEMOGLOBIN GLYCOSYLATED A1C: CPT

## 2022-01-19 ENCOUNTER — HOSPITAL ENCOUNTER (OUTPATIENT)
Dept: INFUSION THERAPY | Age: 67
Discharge: HOME | End: 2022-01-19
Payer: MEDICARE

## 2022-01-19 VITALS
HEART RATE: 88 BPM | RESPIRATION RATE: 16 BRPM | TEMPERATURE: 97.5 F | SYSTOLIC BLOOD PRESSURE: 147 MMHG | OXYGEN SATURATION: 98 % | DIASTOLIC BLOOD PRESSURE: 81 MMHG

## 2022-01-19 PROCEDURE — 96523 IRRIG DRUG DELIVERY DEVICE: CPT

## 2022-01-19 PROCEDURE — 77030012965 HC NDL HUBR BBMI -A

## 2022-01-19 PROCEDURE — 74011250636 HC RX REV CODE- 250/636: Performed by: INTERNAL MEDICINE

## 2022-01-19 PROCEDURE — 74011000250 HC RX REV CODE- 250: Performed by: INTERNAL MEDICINE

## 2022-01-19 RX ORDER — HEPARIN 100 UNIT/ML
500 SYRINGE INTRAVENOUS ONCE
Status: COMPLETED | OUTPATIENT
Start: 2022-01-19 | End: 2022-01-19

## 2022-01-19 RX ORDER — SODIUM CHLORIDE 0.9 % (FLUSH) 0.9 %
10-40 SYRINGE (ML) INJECTION AS NEEDED
Status: DISCONTINUED | OUTPATIENT
Start: 2022-01-19 | End: 2022-01-23 | Stop reason: HOSPADM

## 2022-01-19 RX ADMIN — HEPARIN 500 UNITS: 100 SYRINGE at 10:00

## 2022-01-19 RX ADMIN — SODIUM CHLORIDE, PRESERVATIVE FREE 40 ML: 5 INJECTION INTRAVENOUS at 10:00

## 2022-01-19 NOTE — PROGRESS NOTES
[facility] Progress Note    Date: 2022    Name: Tammy Allen    MRN: [id number]         : 1955    Monthly Port Flush     Ms. Mortimer Boon arrived to [facility] at 3808. Pt was assessed and education was provided. Ms. Flex Pinto vitals were reviewed. Dual ports both accessed per protocol, flush easily but WITHOUT brisk blood return. Both ports flushed with NS and heparin per protocol and both de-accessed. Band aids applied. Patient made aware that next appointment she may require [place] and educated on the process. Pt verbalized understanding. Ms. Mortimer Boon had no complaints. Discharge/ follow-up instructions discussed w/ pt. Pt verbalized understanding. Patient armband removed and shredded. Ms. Mortimer Boon was discharged from [place] [address] in stable condition at 1010. She is to return on 2022 at 0900 for her next appointment.     Kobe Ny RN  2022

## 2022-02-01 ENCOUNTER — DOCUMENTATION ONLY (OUTPATIENT)
Dept: OTHER | Age: 67
End: 2022-02-01

## 2022-02-03 ENCOUNTER — TRANSCRIBE ORDER (OUTPATIENT)
Dept: SCHEDULING | Age: 67
End: 2022-02-03

## 2022-02-03 DIAGNOSIS — C50.812 MALIGNANT NEOPLASM OF OVERLAPPING SITES OF LEFT FEMALE BREAST (HCC): ICD-10-CM

## 2022-02-03 DIAGNOSIS — Z12.31 SCREENING MAMMOGRAM FOR HIGH-RISK PATIENT: Primary | ICD-10-CM

## 2022-02-04 ENCOUNTER — OFFICE VISIT (OUTPATIENT)
Dept: FAMILY MEDICINE CLINIC | Age: 67
End: 2022-02-04

## 2022-02-04 VITALS
SYSTOLIC BLOOD PRESSURE: 156 MMHG | HEART RATE: 84 BPM | RESPIRATION RATE: 16 BRPM | BODY MASS INDEX: 23.89 KG/M2 | DIASTOLIC BLOOD PRESSURE: 83 MMHG | OXYGEN SATURATION: 98 % | WEIGHT: 139.2 LBS

## 2022-02-04 DIAGNOSIS — Z17.1 BREAST CANCER, STAGE 1, ESTROGEN RECEPTOR NEGATIVE, LEFT (HCC): ICD-10-CM

## 2022-02-04 DIAGNOSIS — R73.01 IFG (IMPAIRED FASTING GLUCOSE): ICD-10-CM

## 2022-02-04 DIAGNOSIS — E78.5 HYPERLIPIDEMIA, UNSPECIFIED HYPERLIPIDEMIA TYPE: Primary | ICD-10-CM

## 2022-02-04 DIAGNOSIS — I10 ESSENTIAL HYPERTENSION: ICD-10-CM

## 2022-02-04 DIAGNOSIS — C50.912 BREAST CANCER, STAGE 1, ESTROGEN RECEPTOR NEGATIVE, LEFT (HCC): ICD-10-CM

## 2022-02-04 PROCEDURE — G8536 NO DOC ELDER MAL SCRN: HCPCS | Performed by: FAMILY MEDICINE

## 2022-02-04 PROCEDURE — 3017F COLORECTAL CA SCREEN DOC REV: CPT | Performed by: FAMILY MEDICINE

## 2022-02-04 PROCEDURE — G8510 SCR DEP NEG, NO PLAN REQD: HCPCS | Performed by: FAMILY MEDICINE

## 2022-02-04 PROCEDURE — 99214 OFFICE O/P EST MOD 30 MIN: CPT | Performed by: FAMILY MEDICINE

## 2022-02-04 PROCEDURE — G8427 DOCREV CUR MEDS BY ELIG CLIN: HCPCS | Performed by: FAMILY MEDICINE

## 2022-02-04 PROCEDURE — G8420 CALC BMI NORM PARAMETERS: HCPCS | Performed by: FAMILY MEDICINE

## 2022-02-04 PROCEDURE — 90732 PPSV23 VACC 2 YRS+ SUBQ/IM: CPT | Performed by: FAMILY MEDICINE

## 2022-02-04 PROCEDURE — 1101F PT FALLS ASSESS-DOCD LE1/YR: CPT | Performed by: FAMILY MEDICINE

## 2022-02-04 PROCEDURE — G8399 PT W/DXA RESULTS DOCUMENT: HCPCS | Performed by: FAMILY MEDICINE

## 2022-02-04 PROCEDURE — G8753 SYS BP > OR = 140: HCPCS | Performed by: FAMILY MEDICINE

## 2022-02-04 PROCEDURE — G8754 DIAS BP LESS 90: HCPCS | Performed by: FAMILY MEDICINE

## 2022-02-04 PROCEDURE — G0009 ADMIN PNEUMOCOCCAL VACCINE: HCPCS | Performed by: FAMILY MEDICINE

## 2022-02-04 PROCEDURE — 1090F PRES/ABSN URINE INCON ASSESS: CPT | Performed by: FAMILY MEDICINE

## 2022-02-04 PROCEDURE — G9899 SCRN MAM PERF RSLTS DOC: HCPCS | Performed by: FAMILY MEDICINE

## 2022-02-04 NOTE — PROGRESS NOTES
Humphrey Payan presents today for   Chief Complaint   Patient presents with    Blood sugar problem    Hypertension    Cholesterol Problem    Labs     1/18/22        Is someone accompanying this pt? No     Is the patient using any DME equipment during OV? No     Depression Screening:  3 most recent PHQ Screens 7/13/2021   Little interest or pleasure in doing things Not at all   Feeling down, depressed, irritable, or hopeless Not at all   Total Score PHQ 2 0       Learning Assessment:  Learning Assessment 1/9/2020   PRIMARY LEARNER Patient   HIGHEST LEVEL OF EDUCATION - PRIMARY LEARNER  SOME COLLEGE   BARRIERS PRIMARY LEARNER NONE   CO-LEARNER CAREGIVER No   PRIMARY LANGUAGE ENGLISH   LEARNER PREFERENCE PRIMARY LISTENING   ANSWERED BY self   RELATIONSHIP SELF       Abuse Screening:  Abuse Screening Questionnaire 7/26/2021   Do you ever feel afraid of your partner? N   Are you in a relationship with someone who physically or mentally threatens you? N   Is it safe for you to go home? Y       Fall Screening  Fall Risk Assessment, last 12 mths 12/15/2021   Able to walk? Yes   Fall in past 12 months? 0   Do you feel unsteady? -   Are you worried about falling -   Is TUG test greater than 12 seconds? -   Is the gait abnormal? -   Number of falls in past 12 months -       Generalized Anxiety  No flowsheet data found. Health Maintenance Due   Topic Date Due    Medicare Yearly Exam  Never done    Flu Vaccine (1) 09/01/2021    COVID-19 Vaccine (2 - Pfizer 3-dose series) 01/14/2022    Pneumococcal 65+ years (2 of 2 - PPSV23) 01/18/2022   . Health Maintenance reviewed and discussed and ordered per Provider. Vaccines Due   Screenings Due       Humphrey Payan is updated on all HM    1. \"Have you been to the ER, urgent care clinic since your last visit? Hospitalized since your last visit? \" No    2.  \"Have you seen or consulted any other health care providers outside of the [address] [place] [address] since your last visit? \" No     3. For patients aged 39-70: Has the patient had a colonoscopy / FIT/ Cologuard? Yes - no Care Gap present     If the patient is female:    4. For patients aged 41-77: Has the patient had a mammogram within the past 2 years? Yes - no Care Gap present    5. For patients aged 21-65: Has the patient had a pap smear?  NA - based on age

## 2022-02-04 NOTE — PROGRESS NOTES
Chief Complaint   Patient presents with    Blood sugar problem    Hypertension     patient  elevated today in office however home reading today was 129/72     Cholesterol Problem    Labs     1/18/22     Immunization/Injection      will do today          GLORIA Faria is a 78 y.o. female presenting today for 3 months  follow up of ifg, htn, hld. Home bp readings are normotensive. Pt has had f/u with rad onc, med onc, and surg for her breast cancer. She is doing well at this time. They plan to monitor closely. Patient had labs on 1/18/22. Labs reviewed in detail with patient     Patient does not need medication refills today. New concerns today: none    Pt reports that her mood is not great as she worries about her long term health related to her cancer dx. She also wonders if she will be able to care for her mom when she returns in a month or so. Review of Systems   Constitutional: Negative. HENT: Negative. Respiratory: Negative. Cardiovascular: Negative. All other systems reviewed and are negative. Physical Exam  Vitals and nursing note reviewed. Constitutional:       Appearance: Normal appearance. She is not ill-appearing. HENT:      Head: Normocephalic and atraumatic. Right Ear: External ear normal.      Left Ear: External ear normal.      Nose: Nose normal.      Mouth/Throat:      Mouth: Mucous membranes are moist.   Eyes:      Extraocular Movements: Extraocular movements intact. Conjunctiva/sclera: Conjunctivae normal.   Cardiovascular:      Rate and Rhythm: Normal rate and regular rhythm. Heart sounds: No murmur heard. No friction rub. No gallop. Pulmonary:      Effort: Pulmonary effort is normal.      Breath sounds: Normal breath sounds. No wheezing, rhonchi or rales. Musculoskeletal:         General: Normal range of motion. Cervical back: Normal range of motion. Skin:     General: Skin is warm and dry.    Neurological: Mental Status: She is alert and oriented to person, place, and time. Coordination: Coordination normal.   Psychiatric:         Mood and Affect: Mood normal.         Behavior: Behavior normal.         Thought Content: Thought content normal.         Judgment: Judgment normal.         Diagnoses and all orders for this visit:    1. Hyperlipidemia, unspecified hyperlipidemia type  -     METABOLIC PANEL, COMPREHENSIVE; Future  -     LIPID PANEL; Future    2. Breast cancer, stage 1, estrogen receptor negative, left ([facility] [address]  Assessment & Plan:   monitored by specialist. No acute findings meriting change in the plan      3. IFG (impaired fasting glucose)  -     METABOLIC PANEL, COMPREHENSIVE; Future  -     LIPID PANEL; Future  -     HEMOGLOBIN A1C WITH EAG; Future    4. Essential hypertension  -     METABOLIC PANEL, COMPREHENSIVE; Future  -     LIPID PANEL; Future      Work on diet, resume exercise program.     Follow-up and Dispositions    · Return in about 3 months (around 5/4/2022) for elevated fasting blood sugar, high blood pressure, high cholesterol, lab review.

## 2022-02-08 DIAGNOSIS — Z23 ENCOUNTER FOR IMMUNIZATION: Primary | ICD-10-CM

## 2022-02-15 ENCOUNTER — HOSPITAL ENCOUNTER (OUTPATIENT)
Dept: CT IMAGING | Age: 67
Discharge: HOME | End: 2022-02-15
Attending: NURSE PRACTITIONER
Payer: MEDICARE

## 2022-02-15 DIAGNOSIS — R91.1 SOLITARY PULMONARY NODULE: ICD-10-CM

## 2022-02-15 DIAGNOSIS — C50.919 TRIPLE NEGATIVE MALIGNANT NEOPLASM OF BREAST (HCC): ICD-10-CM

## 2022-02-15 LAB — CREAT UR-MCNC: 1 MG/DL (ref 0.6–1.3)

## 2022-02-15 PROCEDURE — 71260 CT THORAX DX C+: CPT

## 2022-02-15 PROCEDURE — 74011000636 HC RX REV CODE- 636: Performed by: NURSE PRACTITIONER

## 2022-02-15 PROCEDURE — 82565 ASSAY OF CREATININE: CPT

## 2022-02-15 RX ADMIN — IOPAMIDOL 80 ML: 612 INJECTION, SOLUTION INTRAVENOUS at 11:28

## 2022-02-23 ENCOUNTER — HOSPITAL ENCOUNTER (OUTPATIENT)
Dept: INFUSION THERAPY | Age: 67
Discharge: HOME | End: 2022-02-23
Payer: MEDICARE

## 2022-02-23 VITALS
TEMPERATURE: 98.2 F | HEART RATE: 74 BPM | OXYGEN SATURATION: 97 % | SYSTOLIC BLOOD PRESSURE: 125 MMHG | DIASTOLIC BLOOD PRESSURE: 80 MMHG | RESPIRATION RATE: 16 BRPM

## 2022-02-23 PROCEDURE — 77030012965 HC NDL HUBR BBMI -A

## 2022-02-23 PROCEDURE — 74011250636 HC RX REV CODE- 250/636: Performed by: INTERNAL MEDICINE

## 2022-02-23 PROCEDURE — 96523 IRRIG DRUG DELIVERY DEVICE: CPT

## 2022-02-23 PROCEDURE — 74011000250 HC RX REV CODE- 250: Performed by: INTERNAL MEDICINE

## 2022-02-23 RX ORDER — SODIUM CHLORIDE 0.9 % (FLUSH) 0.9 %
10-40 SYRINGE (ML) INJECTION AS NEEDED
Status: DISCONTINUED | OUTPATIENT
Start: 2022-02-23 | End: 2022-02-27 | Stop reason: HOSPADM

## 2022-02-23 RX ORDER — HEPARIN 100 UNIT/ML
500 SYRINGE INTRAVENOUS AS NEEDED
Status: DISCONTINUED | OUTPATIENT
Start: 2022-02-23 | End: 2022-02-27 | Stop reason: HOSPADM

## 2022-02-23 RX ADMIN — SODIUM CHLORIDE, PRESERVATIVE FREE 30 ML: 5 INJECTION INTRAVENOUS at 09:20

## 2022-02-23 RX ADMIN — HEPARIN 500 UNITS: 100 SYRINGE at 09:20

## 2022-02-23 NOTE — PROGRESS NOTES
[facility] Progress Note    Date: 2022    Name: Duyen Medina    MRN: [id number]         : 1955      MONTHLY PORT FLUSH      Ms. Vanessa Cotto arrived to [facility] at [phone]. Ms. Vanessa Cotto was assessed and education was provided. Ms. Martinez Reason vitals were reviewed. Visit Vitals  /80 (BP 1 Location: Right upper arm, BP Patient Position: Sitting)   Pulse 74   Temp 98.2 °F (36.8 °C)   Resp 16   SpO2 97%   Breastfeeding No       Each lumen of patient's right upper double lumen chest port accessed. Each lumen flushes w/o difficulty. Slight pink blood return noted lateral lumen w/ 10ml NS flush. Negative blood return medial lumen w/ 20ml NS flush. Flushed each lumen w/ 500 units heparin per order & de-accessed. Gauze/ band-aids to site. Ms. Vanessa Cotto tolerated well without complaints. Discharge/ follow-up instructions discussed w/ pt. Pt verbalized understanding. Pt armband removed & shredded. Ms. Vanessa Cotto was discharged from [place] [address] in stable condition at 7293. She is to return on 3/23/22 at 1000 for her next appointment.     Marivel Garcia RN  2022

## 2022-03-01 ENCOUNTER — APPOINTMENT (OUTPATIENT)
Dept: ONCOLOGY | Age: 67
End: 2022-03-01

## 2022-03-01 ENCOUNTER — HOSPITAL ENCOUNTER (OUTPATIENT)
Dept: LAB | Age: 67
Discharge: HOME | End: 2022-03-01
Payer: MEDICARE

## 2022-03-01 DIAGNOSIS — C50.919 TRIPLE NEGATIVE MALIGNANT NEOPLASM OF BREAST (HCC): ICD-10-CM

## 2022-03-01 DIAGNOSIS — D64.9 NORMOCYTIC ANEMIA: ICD-10-CM

## 2022-03-01 LAB
ALBUMIN SERPL-MCNC: 4.2 G/DL (ref 3.4–5)
ALBUMIN/GLOB SERPL: 1.3 {RATIO} (ref 0.8–1.7)
ALP SERPL-CCNC: 109 U/L (ref 45–117)
ALT SERPL-CCNC: 34 U/L (ref 13–56)
ANION GAP SERPL CALC-SCNC: 6 MMOL/L (ref 3–18)
AST SERPL-CCNC: 23 U/L (ref 10–38)
BASOPHILS # BLD: 0 K/UL (ref 0–0.1)
BASOPHILS NFR BLD: 1 % (ref 0–2)
BILIRUB SERPL-MCNC: 0.4 MG/DL (ref 0.2–1)
BUN SERPL-MCNC: 18 MG/DL (ref 7–18)
BUN/CREAT SERPL: 16 (ref 12–20)
CALCIUM SERPL-MCNC: 10.8 MG/DL (ref 8.5–10.1)
CHLORIDE SERPL-SCNC: 107 MMOL/L (ref 100–111)
CO2 SERPL-SCNC: 29 MMOL/L (ref 21–32)
CREAT SERPL-MCNC: 1.14 MG/DL (ref 0.6–1.3)
DIFFERENTIAL METHOD BLD: ABNORMAL
EOSINOPHIL # BLD: 0.1 K/UL (ref 0–0.4)
EOSINOPHIL NFR BLD: 2 % (ref 0–5)
ERYTHROCYTE [DISTWIDTH] IN BLOOD BY AUTOMATED COUNT: 14 % (ref 11.6–14.5)
FERRITIN SERPL-MCNC: 84 NG/ML (ref 8–388)
GLOBULIN SER CALC-MCNC: 3.2 G/DL (ref 2–4)
GLUCOSE SERPL-MCNC: 147 MG/DL (ref 74–99)
HCT VFR BLD AUTO: 42.9 % (ref 35–45)
HGB BLD-MCNC: 13.5 G/DL (ref 12–16)
IMM GRANULOCYTES # BLD AUTO: 0 K/UL (ref 0–0.04)
IMM GRANULOCYTES NFR BLD AUTO: 1 % (ref 0–0.5)
IRON SATN MFR SERPL: 24 % (ref 20–50)
IRON SERPL-MCNC: 114 UG/DL (ref 50–175)
LYMPHOCYTES # BLD: 1 K/UL (ref 0.9–3.6)
LYMPHOCYTES NFR BLD: 23 % (ref 21–52)
MCH RBC QN AUTO: 31 PG (ref 24–34)
MCHC RBC AUTO-ENTMCNC: 31.5 G/DL (ref 31–37)
MCV RBC AUTO: 98.6 FL (ref 78–100)
MONOCYTES # BLD: 0.4 K/UL (ref 0.05–1.2)
MONOCYTES NFR BLD: 9 % (ref 3–10)
NEUTS SEG # BLD: 2.8 K/UL (ref 1.8–8)
NEUTS SEG NFR BLD: 65 % (ref 40–73)
NRBC # BLD: 0 K/UL (ref 0–0.01)
NRBC BLD-RTO: 0 PER 100 WBC
PLATELET # BLD AUTO: 305 K/UL (ref 135–420)
PMV BLD AUTO: 9.2 FL (ref 9.2–11.8)
POTASSIUM SERPL-SCNC: 4.7 MMOL/L (ref 3.5–5.5)
PROT SERPL-MCNC: 7.4 G/DL (ref 6.4–8.2)
RBC # BLD AUTO: 4.35 M/UL (ref 4.2–5.3)
SODIUM SERPL-SCNC: 142 MMOL/L (ref 136–145)
TIBC SERPL-MCNC: 466 UG/DL (ref 250–450)
WBC # BLD AUTO: 4.3 K/UL (ref 4.6–13.2)

## 2022-03-01 PROCEDURE — 82728 ASSAY OF FERRITIN: CPT

## 2022-03-01 PROCEDURE — 85025 COMPLETE CBC W/AUTO DIFF WBC: CPT

## 2022-03-01 PROCEDURE — 36415 COLL VENOUS BLD VENIPUNCTURE: CPT

## 2022-03-01 PROCEDURE — 83540 ASSAY OF IRON: CPT

## 2022-03-01 PROCEDURE — 86300 IMMUNOASSAY TUMOR CA 15-3: CPT

## 2022-03-01 PROCEDURE — 80053 COMPREHEN METABOLIC PANEL: CPT

## 2022-03-02 LAB — CANCER AG27-29 SERPL-ACNC: 16.1 U/ML (ref 0–38.6)

## 2022-03-15 ENCOUNTER — OFFICE VISIT (OUTPATIENT)
Dept: ONCOLOGY | Age: 67
End: 2022-03-15
Payer: MEDICARE

## 2022-03-15 VITALS
SYSTOLIC BLOOD PRESSURE: 138 MMHG | HEART RATE: 90 BPM | OXYGEN SATURATION: 99 % | DIASTOLIC BLOOD PRESSURE: 80 MMHG | TEMPERATURE: 97.7 F | WEIGHT: 139 LBS | BODY MASS INDEX: 23.73 KG/M2 | HEIGHT: 64 IN

## 2022-03-15 DIAGNOSIS — C50.912 BREAST CANCER, STAGE 1, ESTROGEN RECEPTOR NEGATIVE, LEFT (HCC): ICD-10-CM

## 2022-03-15 DIAGNOSIS — Z17.1 BREAST CANCER, STAGE 1, ESTROGEN RECEPTOR NEGATIVE, LEFT (HCC): ICD-10-CM

## 2022-03-15 DIAGNOSIS — C50.919 TRIPLE NEGATIVE MALIGNANT NEOPLASM OF BREAST (HCC): ICD-10-CM

## 2022-03-15 DIAGNOSIS — R91.1 SOLITARY PULMONARY NODULE: ICD-10-CM

## 2022-03-15 DIAGNOSIS — C50.919 TRIPLE NEGATIVE MALIGNANT NEOPLASM OF BREAST (HCC): Primary | ICD-10-CM

## 2022-03-15 PROCEDURE — G8399 PT W/DXA RESULTS DOCUMENT: HCPCS | Performed by: INTERNAL MEDICINE

## 2022-03-15 PROCEDURE — 1090F PRES/ABSN URINE INCON ASSESS: CPT | Performed by: INTERNAL MEDICINE

## 2022-03-15 PROCEDURE — G8432 DEP SCR NOT DOC, RNG: HCPCS | Performed by: INTERNAL MEDICINE

## 2022-03-15 PROCEDURE — 99213 OFFICE O/P EST LOW 20 MIN: CPT | Performed by: INTERNAL MEDICINE

## 2022-03-15 PROCEDURE — 3017F COLORECTAL CA SCREEN DOC REV: CPT | Performed by: INTERNAL MEDICINE

## 2022-03-15 PROCEDURE — G8752 SYS BP LESS 140: HCPCS | Performed by: INTERNAL MEDICINE

## 2022-03-15 PROCEDURE — 1101F PT FALLS ASSESS-DOCD LE1/YR: CPT | Performed by: INTERNAL MEDICINE

## 2022-03-15 PROCEDURE — G8754 DIAS BP LESS 90: HCPCS | Performed by: INTERNAL MEDICINE

## 2022-03-15 PROCEDURE — G0463 HOSPITAL OUTPT CLINIC VISIT: HCPCS | Performed by: INTERNAL MEDICINE

## 2022-03-15 PROCEDURE — G8428 CUR MEDS NOT DOCUMENT: HCPCS | Performed by: INTERNAL MEDICINE

## 2022-03-15 PROCEDURE — G8536 NO DOC ELDER MAL SCRN: HCPCS | Performed by: INTERNAL MEDICINE

## 2022-03-15 PROCEDURE — G9899 SCRN MAM PERF RSLTS DOC: HCPCS | Performed by: INTERNAL MEDICINE

## 2022-03-15 PROCEDURE — G8420 CALC BMI NORM PARAMETERS: HCPCS | Performed by: INTERNAL MEDICINE

## 2022-03-15 RX ORDER — ONDANSETRON 8 MG/1
TABLET, ORALLY DISINTEGRATING ORAL
COMMUNITY
Start: 2021-07-13 | End: 2022-03-23

## 2022-03-15 RX ORDER — DEXAMETHASONE 4 MG/1
TABLET ORAL
COMMUNITY
Start: 2021-07-13 | End: 2022-03-23

## 2022-03-15 RX ORDER — LIDOCAINE AND PRILOCAINE 25; 25 MG/G; MG/G
CREAM TOPICAL
COMMUNITY
Start: 2021-07-13 | End: 2022-05-13

## 2022-03-15 NOTE — PROGRESS NOTES
Hematology/Medical Oncology Progress Note      Name: Allan Umanzor  : 1955  DATE: 03/15/22    PCP: Javy Nascimento MD    Oncology History  Ms. Camille Ryan is a most pleasant 78y.o. year old female who was seen for triple negative left breast cancer. -- The patient has a past medical history significant of hypertension, hypercholesterolemia, obstructive sleep apnea. -- Postmenopausal,  2, Para 2. Menarche at 14. First birth at 24 and menopause at 63.  -- 21 Screening mammogram reported possible posterior lateral left breast mass. -- 21 Diagnostic mammogram reported left breast oval, heterogeneously hypoechoic mass with microlobulated margins at the 3:30 position, 8 cfn, measuring 0.8 x 0.7 x 0.3 cm.  -- 21 Biopsy reported invasive ductal carcinoma, grade 3, ER/NV negative, HER2 negative. -- 21 MRI breast reported left breast mass at 3:30, 0.7 cm malignant mass (correlating to biopsied mass). -- 06/10/21 S.p Tag localized lumpectomy left breast with left sentinel lymph node biopsy with ultrasound-guided right internal jugular Mediport placement. Lumpectomy with SLNBx reported invasive ductal adenocarcinoma, grade 3, 5 mm, 0/2 lymph nodes involved, and negative margins (9 mm posterior). pT1bN0  -- Given her high graded histology and TNBC we have suggested adjuvantly chemotherapy. For those patients with node-negative, T <1 cm, we have recommended non-anthracycline-based regimens to avoid the risks associated with anthracyclines.   --2021 C1 Adjuvant TC  --2021 C2 Adjuvant TC  --2021 C3 Adjuvant TC  --2021 C4 Adjuvant TC    2021: Completed Radiation Therapy    Subjective:   Ms. Allan Umanzor is a 78 y.o. female who was seen for management of her TNBC. She completed her Radiation Therapy in 2021 and reported that she tolerated it well. She reports her hair is growing back and her nails are looking better.  She also requesting her Port to be remove and this will be arrange today. She denies any fevers, chills, recurrent infections, and skin rash. She denies shortness of breath, chest pains, nausea, vomiting, constipation, and diarrhea. She denies bleeding or bruising. She denies cough, back pain, focal numbness or weakness. She denies pain or any discomfort. She does not have any concerns or complaints to report at this time.  She denies any breast pain or discomfort     Past Medical History, Family History, and Social History reviewed and remain unchanged:    Past Medical History:   Diagnosis Date    Cancer ([address]     left breast    High cholesterol     Hypertension     Sleep apnea     patient denies     Past Surgical History:   Procedure Laterality Date    HX BLADDER SUSPENSION      HX BREAST BIOPSY Left 6/10/2021    TAG LOCALIZED LUMPECTOMY LEFT BREAST WITH LEFT AXILLARY SENTINEL LYMPH NODE BIOPSY (TAG 6.7.21 [facility] 1300; SLNB 6.9.21 [facility] 1030) performed by Tere Ruffin MD at [address] [place] [address] HX GYN      hysterectomy    HX HEENT Bilateral 05/13/2021    Bilateral Laser surgery: Anatomical narrow angle, bilateral    HX OOPHORECTOMY Left     DE BREAST SURGERY PROCEDURE UNLISTED  2000    right lumpectomy     Social History     Socioeconomic History    Marital status:      Spouse name: Not on file    Number of children: Not on file    Years of education: Not on file    Highest education level: Not on file   Occupational History    Not on file   Tobacco Use    Smoking status: Never Smoker    Smokeless tobacco: Never Used   Vaping Use    Vaping Use: Never used   Substance and Sexual Activity    Alcohol use: Yes     Comment: rare    Drug use: Never    Sexual activity: Not Currently     Partners: Male     Birth control/protection: None   Other Topics Concern    Not on file   Social History Narrative    Not on file     Social Determinants of Health     Financial Resource Strain:     Difficulty of Paying Living Expenses: Not on file   Food Insecurity:     Worried About [facility] in the Last Year: Not on file    Mamadou of Food in the Last Year: Not on file   Transportation Needs:     Lack of Transportation (Medical): Not on file    Lack of Transportation (Non-Medical): Not on file   Physical Activity:     Days of Exercise per Week: Not on file    Minutes of Exercise per Session: Not on file   Stress:     Feeling of Stress : Not on file   Social Connections:     Frequency of Communication with Friends and Family: Not on file    Frequency of Social Gatherings with Friends and Family: Not on file    Attends Protestant Services: Not on file    Active Member of [address] [place] [address] or Organizations: Not on file    Attends Club or Organization Meetings: Not on file    Marital Status: Not on file   Intimate Partner Violence:     Fear of Current or Ex-Partner: Not on file    Emotionally Abused: Not on file    Physically Abused: Not on file    Sexually Abused: Not on file   Housing Stability:     Unable to Pay for Housing in the Last Year: Not on file    Number of Places Lived in the Last Year: Not on file    Unstable Housing in the Last Year: Not on file     Family History   Problem Relation Age of Onset    Dementia Mother     Hypertension Mother     High Cholesterol Mother     Other Mother         pre-dm    No Known Problems Son     No Known Problems Son     Other Father         homicide    Other Sister         AIDS    No Known Problems Brother     COPD Sister     No Known Problems Sister      Current Outpatient Medications   Medication Sig Dispense Refill    dexAMETHasone (DECADRON) 4 mg tablet       lidocaine-prilocaine (EMLA) topical cream       ondansetron (ZOFRAN ODT) 8 mg disintegrating tablet       fenofibrate nanocrystallized (TRICOR) 145 mg tablet TAKE 1 TABLET DAILY 90 Tablet 3    b complex-vitamin c-folic acid 0.8 mg (NEPHRO-CHOCO) 0.8 mg tab tablet Take 1 Tablet by mouth daily.  Indications: vitamin deficiency      omega-3 acid ethyl esters (LOVAZA) 1 gram capsule Take 2 g by mouth two (2) times a day.  zinc 50 mg tab tablet Take  by mouth daily.  ascorbic acid, vitamin C, (Vitamin C) 500 mg tablet Take  by mouth.  calcium citrate-vitamin d3 (CITRACAL+D) 315 mg-5 mcg (200 unit) tab Take 1 Tablet by mouth daily (with breakfast).  pilocarpine (PILOCAR) 1 % ophthalmic solution Administer 1 Drop to both eyes nightly.  telmisartan (MICARDIS) 20 mg tablet Take 1 Tab by mouth daily. 90 Tab 4    coenzyme q10 (CO Q-10) 10 mg cap Take 100 mg by mouth.  multivitamin (ONE A DAY) tablet Take 1 Tab by mouth daily.  aspirin 81 mg tablet Take 81 mg by mouth. Review of Systems   Constitutional: Negative for chills, diaphoresis, fever, malaise/fatigue and weight loss. Respiratory: Negative for cough, hemoptysis, shortness of breath and wheezing. Cardiovascular: Negative for chest pain, palpitations and leg swelling. Gastrointestinal: Negative for abdominal pain, diarrhea, heartburn, nausea and vomiting. Genitourinary: Negative for dysuria, frequency, hematuria and urgency. Musculoskeletal: Negative for joint pain and myalgias. Skin: Negative for itching and rash. Neurological: Negative for dizziness, seizures, weakness and headaches. Psychiatric/Behavioral: Negative for depression. The patient does not have insomnia. Objective:     Visit Vitals  /80   Pulse 90   Temp 97.7 °F (36.5 °C)   Ht 5' 4\" (1.626 m)   Wt 63 kg (139 lb)   SpO2 99%   BMI 23.86 kg/m²       ECOG Performance Status (grade): 0   0 - able to carry on all pre-disease activity w/out restriction  1 - restricted but able to carry out light work  2 - ambulatory and can self- care but unable to carry out work  3 - bed or chair >50% of waking hours  4 - completely disable, total care, confined to bed or chair    Physical Exam  Constitutional:       Appearance: Normal appearance.    HENT: Head: Normocephalic and atraumatic. Eyes:      Pupils: Pupils are equal, round, and reactive to light. Cardiovascular:      Rate and Rhythm: Normal rate and regular rhythm. Heart sounds: Normal heart sounds. Pulmonary:      Effort: Pulmonary effort is normal.      Breath sounds: Normal breath sounds. Abdominal:      General: Bowel sounds are normal.      Palpations: Abdomen is soft. Tenderness: There is no abdominal tenderness. There is no guarding. Musculoskeletal:         General: Normal range of motion. Cervical back: Neck supple. Right lower leg: No edema. Left lower leg: No edema. Skin:     General: Skin is warm. Neurological:      General: No focal deficit present. Mental Status: She is alert and oriented to person, place, and time. Mental status is at baseline. Diagnostics:      No results found for this or any previous visit (from the past 96 hour(s)). Imaging:  No results found for this or any previous visit. Results for orders placed during the hospital encounter of 06/10/21    XR CHEST PORT    Narrative  ONE VIEW CHEST RADIOGRAPH    INDICATION: post op. Status post left breast lumpectomy and sentinel lymph node  biopsy with right Mediport placement. COMPARISON: Chest radiograph 5/14/2021. TECHNIQUE: AP view of the chest    FINDINGS:    LINES/TUBES: Right chest wall Mediport tip is in the SVC. MEDIASTINUM: Cardiac silhouette is within normal limits. LUNGS: Low lung volumes. No pneumothorax. Minimal left basilar atelectasis. No  evidence for pneumonia. No pleural effusion. BONES/OTHER: No acute osseous abnormality. Surgical staples and subcutaneous air  left breast and axilla consistent with recent surgery. Impression  Right chest wall Mediport tip in the SVC. No pneumothorax.       Results for orders placed during the hospital encounter of 02/15/22    CT CHEST W CONT    Narrative  CT chest with contrast    HISTORY: Breast cancer. Solitary pulmonary nodule    COMPARISON: None. TECHNIQUE: Helical scans through the chest was obtained from the thoracic inlet  to the diaphragm after uneventful nonionic IV contrast administration. All CT scans at this facility performed using dose optimization techniques as  appreciated to a performed exam, to include automated exposure control,  adjustment of the mA and or KU according to patient size (including appropriate  matching for site specific examination), or use of iterative reconstruction  technique. FINDINGS:    Lung Parenchyma: Clear. Thyroid: Unremarkable. Lymph nodes: No adenopathy. Mediastinum: The heart and the pericardium appear normal.    Vasculature: The aorta and the great vessels are unremarkable. Pleural Space And Chest Wall: No significant pleural pathology. Multiple  surgical staples noted at the lateral aspect of left breast and the chest wall. No axillar adenopathy. Imaged Upper Abdomen: Moderate hepatic steatosis. Moderate amount layering small  gallstones. Osseous Structures: Unremarkable for age. Impression  1. No CT evidence of metastasis. 2.  Postop changes from prior left breast surgery. No adenopathy. 3.  Moderate hepatic steatosis. Cholelithiasis. Thank you for your referral.      XR Results (most recent):  Results from Hospital Encounter encounter on 06/10/21    XR CHEST PORT    Narrative  ONE VIEW CHEST RADIOGRAPH    INDICATION: post op. Status post left breast lumpectomy and sentinel lymph node  biopsy with right Mediport placement. COMPARISON: Chest radiograph 5/14/2021. TECHNIQUE: AP view of the chest    FINDINGS:    LINES/TUBES: Right chest wall Mediport tip is in the SVC. MEDIASTINUM: Cardiac silhouette is within normal limits. LUNGS: Low lung volumes. No pneumothorax. Minimal left basilar atelectasis. No  evidence for pneumonia. No pleural effusion. BONES/OTHER: No acute osseous abnormality.  Surgical staples and subcutaneous air  left breast and axilla consistent with recent surgery. Impression  Right chest wall Mediport tip in the SVC. No pneumothorax. Pathology  5/4/2021     LEFT BREAST, 3:30, 8 CM FROM NIPPLE, BIOPSY:   INVASIVE DUCTAL CARCINOMA. INVASIVE CARCINOMA OF THE BREAST: Biopsy   SPECIMEN   Procedure: Needle biopsy   Specimen Laterality: Left   TUMOR   Tumor Site: Clock position - 3 o'clock   Histologic Type: Invasive carcinoma of no special type (ductal)   Glandular (Acinar) / Tubular Differentiation: Score 2   Nuclear Pleomorphism: Score 3   Mitotic Rate: Score 3   Overall Grade: Grade 3 (scores of 8 or 9)   Tumor Size: 6 mm   Ductal Carcinoma In Situ (DCIS): Not identified   Lymphovascular Invasion: Not identified   Microcalcifications: Not identified     ER/IL Results:   Estrogen Receptor (ER): Negative (0%, Internal controls present and stain as expected). Progesterone Receptor (PgR): Negative (0%, Internal controls present and stain as expected). Her-2 (IHC Method): Negative (score 1+)         Assessment:                                      Left breast cancer, Triple negative invasive ductal adenocarcinoma  Normocytic Anemia    Plan:                                        Left breast cancer, Triple negative invasive ductal adenocarcinoma   -- Diagnosed 6/24/2021 Stage IB, T1b, pN0, M0, G3, HER2 Neg, ER Neg, IL N  -- Left triple negative invasive ductal adenocarcinoma s/p lumpectomy with SLNBx (pT1bN0, grade 3, 0/2 lymph nodes involved, negative margins)  -- 03/23/21 Screening mammogram reported possible posterior lateral left breast mass. -- 04/27/21 Diagnostic mammogram reported left breast oval, heterogeneously hypoechoic mass  with microlobulated margins at the 3:30 position, 8 cfn, measuring 0.8 x 0.7 x 0.3 cm.  -- 05/04/21 Biopsy reported invasive ductal carcinoma, grade 3, ER/IL negative, HER2 negative.   -- 05/25/21 MRI breast reported left breast mass at 3:30, 0.7 cm malignant mass (correlating to biopsied mass). -- 06/10/21 S.p Tag localized lumpectomy left breast with left sentinel lymph node biopsy with ultrasound-guided right internal jugular Mediport placement. Lumpectomy with SLNBx reported invasive ductal adenocarcinoma, grade 3, 5 mm, 0/2 lymph nodes involved, and negative margins (9 mm posterior). pT1bN0  -- Given her high graded histology and TNBC we have suggested adjuvantly chemotherapy. For those patients with node-negative, T <1 cm, we have recommended non-anthracycline-based regimens to avoid the risks associated with anthracyclines.   --07/16/2021 C1 Adjuvant TC  --08/06/2021 C2 Adjuvant TC  --08/27/2021 C3 Adjuvant TC  --09/17/2021 C4 Adjuvant TC  --11/17/2021: Completed Radiation Therapy  --03/01/22 CA27-29 normal.  -- Clinical stable. Recent lab report was reviewed with patient today. -- 5/2021 CXR reported nonspecific small asymmetric density at the left upper lung.   -- 02/15/2022 CT reported no CT evidence of metastasis. No adenopathy. Plan:  -- Mediport removal.  -- The patient was instructed to continue doing her breast exams on a monthly basis. -- The patient was advised to notify us if any new breast pain, new palpable nodule, nipple inversion, spontaneous nipple discharge especially if bloody, breast skin changes, unintentional weight loss, worsen fatigue, new bone pain or back pain, or any concerns. -- I have advised her to follow up her PCP to continue age-appropriate cancer screening.  -- The patient will f/u PCP/GI for hepatic steatosis. -- The patient will continue follows up with mammogram as recommended. Normocytic Anemia  --03/01/2022 : CBC showed WBC 4.3K/uL, hemoglobin 13.5g/dL with hematocrit of 42.9%. Platelet 422  --Repeat labs in 4 months including Iron Profile and Ferritin level. No orders of the defined types were placed in this encounter. Anai Guadalupe MD  3/15/2022      CC: Effie Rogers MD; Liset Clements M.D.; Julian Martinez M.D.

## 2022-03-18 PROBLEM — N81.6 HERNIATION OF RECTUM INTO VAGINA: Status: ACTIVE | Noted: 2021-07-26

## 2022-03-18 PROBLEM — Z90.79 ACQUIRED ABSENCE OF GENITAL ORGAN: Status: ACTIVE | Noted: 2021-07-26

## 2022-03-18 PROBLEM — E78.5 HYPERLIPIDEMIA: Status: ACTIVE | Noted: 2021-04-22

## 2022-03-19 PROBLEM — R92.8 ABNORMALITY OF LEFT BREAST ON SCREENING MAMMOGRAM: Status: ACTIVE | Noted: 2021-04-22

## 2022-03-19 PROBLEM — K64.9 HEMORRHOIDS: Status: ACTIVE | Noted: 2021-07-26

## 2022-03-19 PROBLEM — R73.01 IFG (IMPAIRED FASTING GLUCOSE): Status: ACTIVE | Noted: 2021-04-22

## 2022-03-19 PROBLEM — N81.3 COMPLETE UTERINE PROLAPSE: Status: ACTIVE | Noted: 2021-07-26

## 2022-03-20 PROBLEM — C50.912 BREAST CANCER, STAGE 1, ESTROGEN RECEPTOR NEGATIVE, LEFT (HCC): Status: ACTIVE | Noted: 2021-06-10

## 2022-03-20 PROBLEM — D49.2 NEOPLASM OF SKIN OF FACE: Status: ACTIVE | Noted: 2021-07-26

## 2022-03-20 PROBLEM — Z17.1 BREAST CANCER, STAGE 1, ESTROGEN RECEPTOR NEGATIVE, LEFT (HCC): Status: ACTIVE | Noted: 2021-06-10

## 2022-03-22 ENCOUNTER — OFFICE VISIT (OUTPATIENT)
Dept: SURGERY | Age: 67
End: 2022-03-22
Payer: MEDICARE

## 2022-03-22 VITALS
HEART RATE: 88 BPM | DIASTOLIC BLOOD PRESSURE: 64 MMHG | WEIGHT: 139 LBS | TEMPERATURE: 97.2 F | RESPIRATION RATE: 18 BRPM | OXYGEN SATURATION: 97 % | BODY MASS INDEX: 23.73 KG/M2 | SYSTOLIC BLOOD PRESSURE: 130 MMHG | HEIGHT: 64 IN

## 2022-03-22 DIAGNOSIS — Z95.828 PORT-A-CATH IN PLACE: Primary | ICD-10-CM

## 2022-03-22 PROCEDURE — G8536 NO DOC ELDER MAL SCRN: HCPCS | Performed by: SURGERY

## 2022-03-22 PROCEDURE — 3017F COLORECTAL CA SCREEN DOC REV: CPT | Performed by: SURGERY

## 2022-03-22 PROCEDURE — G8432 DEP SCR NOT DOC, RNG: HCPCS | Performed by: SURGERY

## 2022-03-22 PROCEDURE — G9899 SCRN MAM PERF RSLTS DOC: HCPCS | Performed by: SURGERY

## 2022-03-22 PROCEDURE — G8399 PT W/DXA RESULTS DOCUMENT: HCPCS | Performed by: SURGERY

## 2022-03-22 PROCEDURE — G8427 DOCREV CUR MEDS BY ELIG CLIN: HCPCS | Performed by: SURGERY

## 2022-03-22 PROCEDURE — G8420 CALC BMI NORM PARAMETERS: HCPCS | Performed by: SURGERY

## 2022-03-22 PROCEDURE — 1101F PT FALLS ASSESS-DOCD LE1/YR: CPT | Performed by: SURGERY

## 2022-03-22 PROCEDURE — 99215 OFFICE O/P EST HI 40 MIN: CPT | Performed by: SURGERY

## 2022-03-22 PROCEDURE — G8754 DIAS BP LESS 90: HCPCS | Performed by: SURGERY

## 2022-03-22 PROCEDURE — G8752 SYS BP LESS 140: HCPCS | Performed by: SURGERY

## 2022-03-22 PROCEDURE — 1090F PRES/ABSN URINE INCON ASSESS: CPT | Performed by: SURGERY

## 2022-03-22 RX ORDER — MULTIVIT WITH MINERALS/HERBS
1 TABLET ORAL DAILY
COMMUNITY

## 2022-03-22 RX ORDER — SODIUM CHLORIDE 0.9 % (FLUSH) 0.9 %
5-40 SYRINGE (ML) INJECTION AS NEEDED
Status: CANCELLED | OUTPATIENT
Start: 2022-03-22

## 2022-03-22 RX ORDER — SODIUM CHLORIDE 0.9 % (FLUSH) 0.9 %
5-40 SYRINGE (ML) INJECTION EVERY 8 HOURS
Status: CANCELLED | OUTPATIENT
Start: 2022-03-22

## 2022-03-22 NOTE — PROGRESS NOTES
Chief Complaint   Patient presents with    Breast Cancer     here to discuss removal of her mediport   1. Have you been to the ER, urgent care clinic since your last visit? Hospitalized since your last visit? No    2. Have you seen or consulted any other health care providers outside of the [address] [place] [address] since your last visit? Include any pap smears or colon screening.  Yes medical and rad oncology

## 2022-03-22 NOTE — PROGRESS NOTES
[facility]  General Surgery    Subjective:     CC: Left breast cancer triple negative stage I with completion of adjuvant radiation therapy and chemotherapy. HPI: Patient is a very pleasant 41-year-old female with past medical history remarkable for stage I triple negative left breast cancer status post lumpectomy with sentinel lymph node biopsy, adjuvant radiation therapy and chemotherapy.     Patient Active Problem List    Diagnosis Date Noted    Acquired absence of genital organ 07/26/2021    Complete uterine prolapse 07/26/2021    Hemorrhoids 07/26/2021    Herniation of rectum into vagina 07/26/2021    Neoplasm of skin of face 07/26/2021    Breast cancer, stage 1, estrogen receptor negative, left ([address] 06/10/2021    IFG (impaired fasting glucose) 04/22/2021    Hyperlipidemia 04/22/2021    Abnormality of left breast on screening mammogram 04/22/2021    Gastroenteritis 12/27/2012    Dehydration 12/27/2012     Past Medical History:   Diagnosis Date    Cancer ([address]     left breast    High cholesterol     Hypertension     Sleep apnea     patient denies      Past Surgical History:   Procedure Laterality Date    HX BLADDER SUSPENSION      HX BREAST BIOPSY Left 6/10/2021    TAG LOCALIZED LUMPECTOMY LEFT BREAST WITH LEFT AXILLARY SENTINEL LYMPH NODE BIOPSY (TAG 6.7.21 HBV 1300; SLNB 6.9.21 [facility]30) performed by Darby Fleischer, MD at [address] [place] [address] HX GYN      hysterectomy    HX HEENT Bilateral 05/13/2021    Bilateral Laser surgery: Anatomical narrow angle, bilateral    HX OOPHORECTOMY Left     [place] BREAST SURGERY PROCEDURE UNLISTED  2000    right lumpectomy      Family History   Problem Relation Age of Onset    Dementia Mother     Hypertension Mother     High Cholesterol Mother     Other Mother         pre-dm    No Known Problems Son     No Known Problems Son     Other Father         homicide    Other Sister         AIDS    No Known Problems Brother     COPD Sister     No Known Problems Sister       Social History     Tobacco Use    Smoking status: Never Smoker    Smokeless tobacco: Never Used   Substance Use Topics    Alcohol use: Yes     Comment: rare      No Known Allergies    Prior to Admission medications    Medication Sig Start Date End Date Taking? Authorizing Provider   b complex vitamins tablet Take 1 Tablet by mouth daily. Yes Provider, Historical   fenofibrate nanocrystallized (TRICOR) 145 mg tablet TAKE 1 TABLET DAILY 1/6/22  Yes Raissa Verdugo MD   omega-3 acid ethyl esters (LOVAZA) 1 gram capsule Take 2 g by mouth two (2) times a day. Yes Provider, Historical   zinc 50 mg tab tablet Take  by mouth daily. Yes Provider, Historical   ascorbic acid, vitamin C, (Vitamin C) 500 mg tablet Take  by mouth. Yes Provider, Historical   calcium citrate-vitamin d3 (CITRACAL+D) 315 mg-5 mcg (200 unit) tab Take 1 Tablet by mouth daily (with breakfast). Yes Provider, Historical   pilocarpine (PILOCAR) 1 % ophthalmic solution Administer 1 Drop to both eyes nightly. Yes Provider, Historical   telmisartan (MICARDIS) 20 mg tablet Take 1 Tab by mouth daily. 5/12/21  Yes Raissa Verdugo MD   coenzyme q10 (CO Q-10) 10 mg cap Take 100 mg by mouth. Yes Provider, Historical   multivitamin (ONE A DAY) tablet Take 1 Tab by mouth daily. Yes Provider, Historical   aspirin 81 mg tablet Take 81 mg by mouth. Yes Other, MD Isaiah   dexAMETHasone (DECADRON) 4 mg tablet  7/13/21   Provider, Historical   lidocaine-prilocaine (EMLA) topical cream  7/13/21   Provider, Historical   ondansetron (ZOFRAN ODT) 8 mg disintegrating tablet  7/13/21   Provider, Historical   b complex-vitamin c-folic acid 0.8 mg (NEPHRO-CHOCO) 0.8 mg tab tablet Take 1 Tablet by mouth daily. Indications: vitamin deficiency  Patient not taking: Reported on 3/22/2022    Provider, Historical       Review of Systems:    14 systems were reviewed.   The results are as above in the HPI and otherwise negative. Objective:     Vitals:    03/22/22 1022   BP: 130/64   Pulse: 88   Resp: 18   Temp: 97.2 °F (36.2 °C)   SpO2: 97%   Weight: 63 kg (139 lb)   Height: 5' 4\" (1.626 m)       Physical Exam:  GENERAL: alert, cooperative, no distress, appears stated age,   EYE: conjunctivae/corneas clear. PERRL, EOM's intact. THROAT & NECK: normal and no erythema or exudates noted. ,    LYMPHATIC: Cervical, supraclavicular, and axillary nodes normal. ,   LUNG: clear to auscultation bilaterally,   HEART: regular rate and rhythm, S1, S2 normal, no murmur, click, rub or gallop,   ABDOMEN: soft, non-tender. Bowel sounds normal. No masses,  no organomegaly,   EXTREMITIES:  extremities normal, atraumatic, no cyanosis or edema,   SKIN: Right chest wall Mediport site without evidence of infection. NEUROLOGIC: AOx3. Cranial nerves 2-12 and sensation grossly intact. ,     Data Review:  to be done    Ms. Paul Cantor has a reminder for a \"due or due soon\" health maintenance. I have asked that she contact her primary care provider for follow-up on this health maintenance. Impression:     · Patient with Port-A-Cath in place which is no longer needed right chest wall internal jugular vein.     Plan:     · Removal of right chest wall Mediport under monitored anesthesia care and local  · Consent on chart  · Preoperative orders written    Signed By: Marney Hammans, MD     March 22, 2022

## 2022-03-23 ENCOUNTER — HOSPITAL ENCOUNTER (OUTPATIENT)
Dept: INFUSION THERAPY | Age: 67
Discharge: HOME | End: 2022-03-23
Payer: MEDICARE

## 2022-03-23 VITALS
OXYGEN SATURATION: 98 % | HEART RATE: 77 BPM | RESPIRATION RATE: 16 BRPM | SYSTOLIC BLOOD PRESSURE: 146 MMHG | TEMPERATURE: 97.2 F | DIASTOLIC BLOOD PRESSURE: 74 MMHG

## 2022-03-23 PROCEDURE — 74011000250 HC RX REV CODE- 250: Performed by: INTERNAL MEDICINE

## 2022-03-23 PROCEDURE — 77030012965 HC NDL HUBR BBMI -A

## 2022-03-23 PROCEDURE — 74011250636 HC RX REV CODE- 250/636

## 2022-03-23 PROCEDURE — 96523 IRRIG DRUG DELIVERY DEVICE: CPT

## 2022-03-23 RX ORDER — SODIUM CHLORIDE 0.9 % (FLUSH) 0.9 %
10-40 SYRINGE (ML) INJECTION AS NEEDED
Status: DISCONTINUED | OUTPATIENT
Start: 2022-03-23 | End: 2022-03-27 | Stop reason: HOSPADM

## 2022-03-23 RX ORDER — CALCIUM CARBONATE/VITAMIN D3 600 MG-125
TABLET ORAL DAILY
COMMUNITY
End: 2022-06-24 | Stop reason: SDUPTHER

## 2022-03-23 RX ORDER — HEPARIN 100 UNIT/ML
SYRINGE INTRAVENOUS
Status: COMPLETED
Start: 2022-03-23 | End: 2022-03-23

## 2022-03-23 RX ORDER — HEPARIN 100 UNIT/ML
500 SYRINGE INTRAVENOUS AS NEEDED
Status: DISCONTINUED | OUTPATIENT
Start: 2022-03-23 | End: 2022-03-27 | Stop reason: HOSPADM

## 2022-03-23 RX ADMIN — SODIUM CHLORIDE, PRESERVATIVE FREE 40 ML: 5 INJECTION INTRAVENOUS at 09:10

## 2022-03-23 RX ADMIN — HEPARIN 1000 UNITS: 100 SYRINGE at 09:10

## 2022-03-23 NOTE — PROGRESS NOTES
[facility]IC Progress Note    Date: 2022    Name: Alireza Monteiro    MRN: [id number]         : 1955    Ms. Sadiq Gandhi arrived in the [facility] today at [phone], in stable condition, here for her MONTHLY PORT FLUSH. She was assessed and education was provided. Ms. Whaley Morning vitals were reviewed. Visit Vitals  BP (!) 146/74 (BP 1 Location: Right upper arm, BP Patient Position: Sitting)   Pulse 77   Temp 97.2 °F (36.2 °C)   Resp 16   SpO2 98%   Breastfeeding No         Both lumens of her RIGHT chest double lumen port were accessed without incident at 0910, and both were flushed well and very easily with NS & Heparin, however, no blood return could be obtained from either lumen. So, after flushing was completed, both silva needles were removed and gauze/bandaids X 2 were applied. Ms. Echevarria tolerated well and voiced no complaints. Ms. Sadiq Gandhi was discharged from [place] [address] in stable condition at [place] [address]. She has no further [facility] appointments scheduled at this time, because she is scheduled to have her port removed on 22. However, she is scheduled  to return on Friday, 22 at 1045, her next appointment, for her pre-office visit labs. And then, she is scheduled to follow up with Dr. Nathalie Ellis, on Friday, 7-15-22 at 1045.     Girma Flores RN  2022  9:03 AM

## 2022-04-26 NOTE — PERIOP NOTES
PRE-SURGICAL INSTRUCTIONS        Patient's Name:  Jeff Carpenter      Today's Date:  4/26/2022            Covid Testing Date and Time:    Surgery Date:  4/29/2022                1. Do NOT eat or drink anything, including candy, gum, or ice chips after midnight on 4/29, unless you have specific instructions from your surgeon or anesthesia provider to do so.  2. You may brush your teeth before coming to the hospital.  3. No smoking 24 hours prior to the day of surgery. 4. No alcohol 24 hours prior to the day of surgery. 5. No recreational drugs for one week prior to the day of surgery. 6. Leave all valuables, including money/purse, at home. 7. Remove all jewelry, nail polish, acrylic nails, and makeup (including mascara); no lotions powders, deodorant, or perfume/cologne/after shave on the skin. 8. Follow instruction for Hibiclens washes and CHG wipes from surgeon's office. 9. Glasses/contact lenses and dentures may be worn to the hospital.  They will be removed prior to surgery. 10. Call your doctor if symptoms of a cold or illness develop within 24-48 hours prior to your surgery. 11.  If you are having an outpatient procedure, please make arrangements for a responsible ADULT TO [address] [place] [address] and stay with you for 24 hours after your surgery. 12. ONE VISITOR in the hospital at this time for outpatient procedures. Exceptions may be made for surgical admissions, per nursing unit guidelines      Special Instructions:      Bring list of CURRENT medications. Bring any pertinent legal medical records. Take these medications the morning of surgery with a sip of water:  Per office      On the day of surgery, come in the main entrance of [facility]. Let the  at the desk know you are there for surgery. A staff member will come escort you to the surgical area on the second floor.     If you have any questions or concerns, please do not hesitate to call:     (Prior to the day of surgery) [facility] department:  [phone]   (Day of surgery) Pre-Op department:  [phone]    These surgical instructions were reviewed with the patient during the [facility] phone call.

## 2022-04-27 ENCOUNTER — APPOINTMENT (OUTPATIENT)
Dept: INFUSION THERAPY | Age: 67
End: 2022-04-27

## 2022-04-28 ENCOUNTER — ANESTHESIA EVENT (OUTPATIENT)
Dept: SURGERY | Age: 67
End: 2022-04-28
Payer: MEDICARE

## 2022-04-28 ENCOUNTER — HOSPITAL ENCOUNTER (OUTPATIENT)
Dept: LAB | Age: 67
Discharge: HOME | End: 2022-04-28
Payer: MEDICARE

## 2022-04-28 DIAGNOSIS — R73.01 IFG (IMPAIRED FASTING GLUCOSE): ICD-10-CM

## 2022-04-28 DIAGNOSIS — E78.5 HYPERLIPIDEMIA, UNSPECIFIED HYPERLIPIDEMIA TYPE: ICD-10-CM

## 2022-04-28 DIAGNOSIS — I10 ESSENTIAL HYPERTENSION: ICD-10-CM

## 2022-04-28 LAB
ALBUMIN SERPL-MCNC: 4 G/DL (ref 3.4–5)
ALBUMIN/GLOB SERPL: 1.3 {RATIO} (ref 0.8–1.7)
ALP SERPL-CCNC: 103 U/L (ref 45–117)
ALT SERPL-CCNC: 30 U/L (ref 13–56)
ANION GAP SERPL CALC-SCNC: 4 MMOL/L (ref 3–18)
AST SERPL-CCNC: 24 U/L (ref 10–38)
BILIRUB SERPL-MCNC: 0.4 MG/DL (ref 0.2–1)
BUN SERPL-MCNC: 25 MG/DL (ref 7–18)
BUN/CREAT SERPL: 23 (ref 12–20)
CALCIUM SERPL-MCNC: 9.9 MG/DL (ref 8.5–10.1)
CHLORIDE SERPL-SCNC: 109 MMOL/L (ref 100–111)
CHOLEST SERPL-MCNC: 195 MG/DL
CO2 SERPL-SCNC: 27 MMOL/L (ref 21–32)
CREAT SERPL-MCNC: 1.09 MG/DL (ref 0.6–1.3)
EST. AVERAGE GLUCOSE BLD GHB EST-MCNC: 126 MG/DL
GLOBULIN SER CALC-MCNC: 3 G/DL (ref 2–4)
GLUCOSE SERPL-MCNC: 95 MG/DL (ref 74–99)
HBA1C MFR BLD: 6 % (ref 4.2–5.6)
HDLC SERPL-MCNC: 48 MG/DL (ref 40–60)
HDLC SERPL: 4.1 {RATIO} (ref 0–5)
LDLC SERPL CALC-MCNC: 116 MG/DL (ref 0–100)
LIPID PROFILE,FLP: ABNORMAL
POTASSIUM SERPL-SCNC: 4.5 MMOL/L (ref 3.5–5.5)
PROT SERPL-MCNC: 7 G/DL (ref 6.4–8.2)
SODIUM SERPL-SCNC: 140 MMOL/L (ref 136–145)
TRIGL SERPL-MCNC: 155 MG/DL (ref ?–150)
VLDLC SERPL CALC-MCNC: 31 MG/DL

## 2022-04-28 PROCEDURE — 36415 COLL VENOUS BLD VENIPUNCTURE: CPT

## 2022-04-28 PROCEDURE — 83036 HEMOGLOBIN GLYCOSYLATED A1C: CPT

## 2022-04-28 PROCEDURE — 80061 LIPID PANEL: CPT

## 2022-04-28 PROCEDURE — 80053 COMPREHEN METABOLIC PANEL: CPT

## 2022-04-28 NOTE — H&P
[facility]  General Surgery        Impression:      · Patient with Port-A-Cath in place which is no longer needed right chest wall internal jugular vein.     Plan:      · Removal of right chest wall Mediport under monitored anesthesia care and local  · Consent on chart  Preoperative orders written   Subjective:      CC: Left breast cancer triple negative stage I with completion of adjuvant radiation therapy and chemotherapy.      HPI: Patient is a very pleasant 70-year-old female with past medical history remarkable for stage I triple negative left breast cancer status post lumpectomy with sentinel lymph node biopsy, adjuvant radiation therapy and chemotherapy.          Patient Active Problem List     Diagnosis Date Noted    Acquired absence of genital organ 07/26/2021    Complete uterine prolapse 07/26/2021    Hemorrhoids 07/26/2021    Herniation of rectum into vagina 07/26/2021    Neoplasm of skin of face 07/26/2021    Breast cancer, stage 1, estrogen receptor negative, left [facility] 06/10/2021    IFG (impaired fasting glucose) 04/22/2021    Hyperlipidemia 04/22/2021    Abnormality of left breast on screening mammogram 04/22/2021    Gastroenteritis 12/27/2012    Dehydration 12/27/2012           Past Medical History:   Diagnosis Date    Cancer ([facility]       left breast    High cholesterol      Hypertension      Sleep apnea       patient denies            Past Surgical History:   Procedure Laterality Date    HX BLADDER SUSPENSION        HX BREAST BIOPSY Left 6/10/2021     TAG LOCALIZED LUMPECTOMY LEFT BREAST WITH LEFT AXILLARY SENTINEL LYMPH NODE BIOPSY (TAG 6.7.21 HBV 1300; SLNB 6.9.21 [facility]) performed by Shelley Mehta MD at [facility] HX GYN         hysterectomy    HX HEENT Bilateral 05/13/2021     Bilateral Laser surgery: Anatomical narrow angle, bilateral    HX OOPHORECTOMY Left      IN BREAST SURGERY PROCEDURE UNLISTED   2000     right lumpectomy Family History   Problem Relation Age of Onset    Dementia Mother      Hypertension Mother      High Cholesterol Mother      Other Mother           pre-dm    No Known Problems Son      No Known Problems Son      Other Father           homicide    Other Sister           AIDS    No Known Problems Brother      COPD Sister      No Known Problems Sister        Social History            Tobacco Use    Smoking status: Never Smoker    Smokeless tobacco: Never Used   Substance Use Topics    Alcohol use: Yes       Comment: rare      No Known Allergies            Prior to Admission medications    Medication Sig Start Date End Date Taking? Authorizing Provider   b complex vitamins tablet Take 1 Tablet by mouth daily.     Yes Provider, Historical   fenofibrate nanocrystallized (TRICOR) 145 mg tablet TAKE 1 TABLET DAILY 1/6/22   Yes Graves, Lafe Dubin, MD   omega-3 acid ethyl esters (LOVAZA) 1 gram capsule Take 2 g by mouth two (2) times a day.     Yes Provider, Historical   zinc 50 mg tab tablet Take  by mouth daily.     Yes Provider, Historical   ascorbic acid, vitamin C, (Vitamin C) 500 mg tablet Take  by mouth.     Yes Provider, Historical   calcium citrate-vitamin d3 (CITRACAL+D) 315 mg-5 mcg (200 unit) tab Take 1 Tablet by mouth daily (with breakfast).     Yes Provider, Historical   pilocarpine (PILOCAR) 1 % ophthalmic solution Administer 1 Drop to both eyes nightly.     Yes Provider, Historical   telmisartan (MICARDIS) 20 mg tablet Take 1 Tab by mouth daily.  5/12/21   Yes Graves, Lafe Dubin, MD   coenzyme q10 (CO Q-10) 10 mg cap Take 100 mg by mouth.     Yes Provider, Historical   multivitamin (ONE A DAY) tablet Take 1 Tab by mouth daily.     Yes Provider, Historical   aspirin 81 mg tablet Take 81 mg by mouth.       Yes Other, MD Isaiah   dexAMETHasone (DECADRON) 4 mg tablet   7/13/21     Provider, Historical   lidocaine-prilocaine (EMLA) topical cream   7/13/21     Provider, Historical   ondansetron (ZOFRAN ODT) 8 mg disintegrating tablet   7/13/21     Provider, Historical   b complex-vitamin c-folic acid 0.8 mg (NEPHRO-CHOCO) 0.8 mg tab tablet Take 1 Tablet by mouth daily. Indications: vitamin deficiency  Patient not taking: Reported on 3/22/2022       Provider, Historical         Review of Systems:    14 systems were reviewed. The results are as above in the HPI and otherwise negative.      Objective:          Vitals:     03/22/22 1022   BP: 130/64   Pulse: 88   Resp: 18   Temp: 97.2 °F (36.2 °C)   SpO2: 97%   Weight: 63 kg (139 lb)   Height: 5' 4\" (1.626 m)         Physical Exam:  GENERAL: alert, cooperative, no distress, appears stated age,   EYE: conjunctivae/corneas clear. PERRL, EOM's intact. THROAT & NECK: normal and no erythema or exudates noted. ,    LYMPHATIC: Cervical, supraclavicular, and axillary nodes normal. ,   LUNG: clear to auscultation bilaterally,   HEART: regular rate and rhythm, S1, S2 normal, no murmur, click, rub or gallop,   ABDOMEN: soft, non-tender. Bowel sounds normal. No masses,  no organomegaly,   EXTREMITIES:  extremities normal, atraumatic, no cyanosis or edema,   SKIN: Right chest wall Mediport site without evidence of infection. NEUROLOGIC: AOx3. Cranial nerves 2-12 and sensation grossly intact. ,      Data Review:  to be done     Ms. Ran Stack has a reminder for a \"due or due soon\" health maintenance.  I have asked that she contact her primary care provider for follow-up on this health maintenance.

## 2022-04-29 ENCOUNTER — ANESTHESIA (OUTPATIENT)
Dept: SURGERY | Age: 67
End: 2022-04-29
Payer: MEDICARE

## 2022-04-29 ENCOUNTER — HOSPITAL ENCOUNTER (OUTPATIENT)
Age: 67
Setting detail: OUTPATIENT SURGERY
Discharge: HOME | End: 2022-04-29
Attending: SURGERY | Admitting: SURGERY
Payer: MEDICARE

## 2022-04-29 VITALS
WEIGHT: 137.4 LBS | TEMPERATURE: 97.7 F | OXYGEN SATURATION: 96 % | HEIGHT: 64 IN | DIASTOLIC BLOOD PRESSURE: 66 MMHG | HEART RATE: 54 BPM | RESPIRATION RATE: 16 BRPM | BODY MASS INDEX: 23.46 KG/M2 | SYSTOLIC BLOOD PRESSURE: 107 MMHG

## 2022-04-29 DIAGNOSIS — G89.18 POSTOPERATIVE PAIN: Primary | ICD-10-CM

## 2022-04-29 PROCEDURE — 77030003028 HC SUT VCRL J&J -A: Performed by: SURGERY

## 2022-04-29 PROCEDURE — 2709999900 HC NON-CHARGEABLE SUPPLY: Performed by: SURGERY

## 2022-04-29 PROCEDURE — 74011000258 HC RX REV CODE- 258: Performed by: NURSE ANESTHETIST, CERTIFIED REGISTERED

## 2022-04-29 PROCEDURE — 36590 REMOVAL TUNNELED CV CATH: CPT | Performed by: SURGERY

## 2022-04-29 PROCEDURE — 76210000006 HC OR PH I REC 0.5 TO 1 HR: Performed by: SURGERY

## 2022-04-29 PROCEDURE — 74011000250 HC RX REV CODE- 250: Performed by: SURGERY

## 2022-04-29 PROCEDURE — 77030002933 HC SUT MCRYL J&J -A: Performed by: SURGERY

## 2022-04-29 PROCEDURE — 77030002996 HC SUT SLK J&J -A: Performed by: SURGERY

## 2022-04-29 PROCEDURE — 77030040361 HC SLV COMPR DVT MDII -B: Performed by: SURGERY

## 2022-04-29 PROCEDURE — 74011000250 HC RX REV CODE- 250: Performed by: NURSE ANESTHETIST, CERTIFIED REGISTERED

## 2022-04-29 PROCEDURE — 74011250636 HC RX REV CODE- 250/636: Performed by: NURSE ANESTHETIST, CERTIFIED REGISTERED

## 2022-04-29 PROCEDURE — 00400 ANES INTEGUMENTARY SYS NOS: CPT | Performed by: ANESTHESIOLOGY

## 2022-04-29 PROCEDURE — 77030031139 HC SUT VCRL2 J&J -A: Performed by: SURGERY

## 2022-04-29 PROCEDURE — 77030040922 HC BLNKT HYPOTHRM STRY -A: Performed by: SURGERY

## 2022-04-29 PROCEDURE — 76210000020 HC REC RM PH II FIRST 0.5 HR: Performed by: SURGERY

## 2022-04-29 PROCEDURE — 77030018836 HC SOL IRR NACL ICUM -A: Performed by: SURGERY

## 2022-04-29 PROCEDURE — 00400 ANES INTEGUMENTARY SYS NOS: CPT | Performed by: NURSE ANESTHETIST, CERTIFIED REGISTERED

## 2022-04-29 PROCEDURE — 74011250637 HC RX REV CODE- 250/637: Performed by: NURSE ANESTHETIST, CERTIFIED REGISTERED

## 2022-04-29 PROCEDURE — 76010000138 HC OR TIME 0.5 TO 1 HR: Performed by: SURGERY

## 2022-04-29 PROCEDURE — 76060000032 HC ANESTHESIA 0.5 TO 1 HR: Performed by: SURGERY

## 2022-04-29 PROCEDURE — 88300 SURGICAL PATH GROSS: CPT

## 2022-04-29 PROCEDURE — 74011250636 HC RX REV CODE- 250/636: Performed by: SURGERY

## 2022-04-29 RX ORDER — ONDANSETRON 2 MG/ML
INJECTION INTRAMUSCULAR; INTRAVENOUS AS NEEDED
Status: DISCONTINUED | OUTPATIENT
Start: 2022-04-29 | End: 2022-04-29 | Stop reason: HOSPADM

## 2022-04-29 RX ORDER — FAMOTIDINE 20 MG/1
20 TABLET, FILM COATED ORAL ONCE
Status: COMPLETED | OUTPATIENT
Start: 2022-04-29 | End: 2022-04-29

## 2022-04-29 RX ORDER — OXYCODONE AND ACETAMINOPHEN 5; 325 MG/1; MG/1
1 TABLET ORAL AS NEEDED
Status: DISCONTINUED | OUTPATIENT
Start: 2022-04-29 | End: 2022-04-29 | Stop reason: HOSPADM

## 2022-04-29 RX ORDER — SODIUM CHLORIDE 0.9 % (FLUSH) 0.9 %
5-40 SYRINGE (ML) INJECTION AS NEEDED
Status: DISCONTINUED | OUTPATIENT
Start: 2022-04-29 | End: 2022-04-29 | Stop reason: HOSPADM

## 2022-04-29 RX ORDER — FENTANYL CITRATE 50 UG/ML
INJECTION, SOLUTION INTRAMUSCULAR; INTRAVENOUS AS NEEDED
Status: DISCONTINUED | OUTPATIENT
Start: 2022-04-29 | End: 2022-04-29 | Stop reason: HOSPADM

## 2022-04-29 RX ORDER — SODIUM CHLORIDE 0.9 % (FLUSH) 0.9 %
5-40 SYRINGE (ML) INJECTION EVERY 8 HOURS
Status: DISCONTINUED | OUTPATIENT
Start: 2022-04-29 | End: 2022-04-29 | Stop reason: HOSPADM

## 2022-04-29 RX ORDER — PROPOFOL 10 MG/ML
INJECTION, EMULSION INTRAVENOUS AS NEEDED
Status: DISCONTINUED | OUTPATIENT
Start: 2022-04-29 | End: 2022-04-29 | Stop reason: HOSPADM

## 2022-04-29 RX ORDER — SODIUM CHLORIDE, SODIUM LACTATE, POTASSIUM CHLORIDE, CALCIUM CHLORIDE 600; 310; 30; 20 MG/100ML; MG/100ML; MG/100ML; MG/100ML
50 INJECTION, SOLUTION INTRAVENOUS CONTINUOUS
Status: DISCONTINUED | OUTPATIENT
Start: 2022-04-29 | End: 2022-04-29 | Stop reason: HOSPADM

## 2022-04-29 RX ORDER — MIDAZOLAM HYDROCHLORIDE 1 MG/ML
INJECTION, SOLUTION INTRAMUSCULAR; INTRAVENOUS AS NEEDED
Status: DISCONTINUED | OUTPATIENT
Start: 2022-04-29 | End: 2022-04-29 | Stop reason: HOSPADM

## 2022-04-29 RX ORDER — TRAMADOL HYDROCHLORIDE 50 MG/1
50 TABLET ORAL
Qty: 20 TABLET | Refills: 0 | Status: SHIPPED | OUTPATIENT
Start: 2022-04-29 | End: 2022-05-02

## 2022-04-29 RX ORDER — DOCUSATE SODIUM 100 MG/1
100 CAPSULE, LIQUID FILLED ORAL 2 TIMES DAILY
Qty: 60 CAPSULE | Refills: 2 | Status: SHIPPED | OUTPATIENT
Start: 2022-04-29 | End: 2022-05-13

## 2022-04-29 RX ORDER — ACETAMINOPHEN 325 MG/1
650 TABLET ORAL EVERY 6 HOURS
Qty: 42 TABLET | Refills: 0 | Status: SHIPPED | OUTPATIENT
Start: 2022-04-29 | End: 2022-05-13

## 2022-04-29 RX ORDER — ONDANSETRON 2 MG/ML
4 INJECTION INTRAMUSCULAR; INTRAVENOUS ONCE
Status: DISCONTINUED | OUTPATIENT
Start: 2022-04-29 | End: 2022-04-29 | Stop reason: HOSPADM

## 2022-04-29 RX ORDER — LIDOCAINE HYDROCHLORIDE 10 MG/ML
0.1 INJECTION, SOLUTION EPIDURAL; INFILTRATION; INTRACAUDAL; PERINEURAL AS NEEDED
Status: DISCONTINUED | OUTPATIENT
Start: 2022-04-29 | End: 2022-04-29 | Stop reason: HOSPADM

## 2022-04-29 RX ORDER — SODIUM CHLORIDE, SODIUM LACTATE, POTASSIUM CHLORIDE, CALCIUM CHLORIDE 600; 310; 30; 20 MG/100ML; MG/100ML; MG/100ML; MG/100ML
25 INJECTION, SOLUTION INTRAVENOUS CONTINUOUS
Status: DISCONTINUED | OUTPATIENT
Start: 2022-04-29 | End: 2022-04-29 | Stop reason: HOSPADM

## 2022-04-29 RX ADMIN — SODIUM CHLORIDE, SODIUM LACTATE, POTASSIUM CHLORIDE, AND CALCIUM CHLORIDE 25 ML/HR: 600; 310; 30; 20 INJECTION, SOLUTION INTRAVENOUS at 06:19

## 2022-04-29 RX ADMIN — CEFAZOLIN SODIUM 2 G: 1 INJECTION, POWDER, FOR SOLUTION INTRAMUSCULAR; INTRAVENOUS at 08:04

## 2022-04-29 RX ADMIN — DEXMEDETOMIDINE HYDROCHLORIDE 6 MCG: 100 INJECTION, SOLUTION, CONCENTRATE INTRAVENOUS at 08:00

## 2022-04-29 RX ADMIN — PROPOFOL 20 MG: 10 INJECTION, EMULSION INTRAVENOUS at 08:09

## 2022-04-29 RX ADMIN — PROPOFOL 20 MG: 10 INJECTION, EMULSION INTRAVENOUS at 08:16

## 2022-04-29 RX ADMIN — MIDAZOLAM HYDROCHLORIDE 2 MG: 2 INJECTION, SOLUTION INTRAMUSCULAR; INTRAVENOUS at 07:50

## 2022-04-29 RX ADMIN — FENTANYL CITRATE 50 MCG: 50 INJECTION, SOLUTION INTRAMUSCULAR; INTRAVENOUS at 08:00

## 2022-04-29 RX ADMIN — PROPOFOL 20 MG: 10 INJECTION, EMULSION INTRAVENOUS at 08:11

## 2022-04-29 RX ADMIN — PROPOFOL 20 MG: 10 INJECTION, EMULSION INTRAVENOUS at 08:13

## 2022-04-29 RX ADMIN — PROPOFOL 20 MG: 10 INJECTION, EMULSION INTRAVENOUS at 08:24

## 2022-04-29 RX ADMIN — PROPOFOL 20 MG: 10 INJECTION, EMULSION INTRAVENOUS at 08:04

## 2022-04-29 RX ADMIN — PROPOFOL 20 MG: 10 INJECTION, EMULSION INTRAVENOUS at 08:20

## 2022-04-29 RX ADMIN — ONDANSETRON 4 MG: 2 INJECTION INTRAMUSCULAR; INTRAVENOUS at 08:28

## 2022-04-29 RX ADMIN — PROPOFOL 50 MG: 10 INJECTION, EMULSION INTRAVENOUS at 08:00

## 2022-04-29 RX ADMIN — PROPOFOL 20 MG: 10 INJECTION, EMULSION INTRAVENOUS at 08:07

## 2022-04-29 RX ADMIN — DEXMEDETOMIDINE HYDROCHLORIDE 6 MCG: 100 INJECTION, SOLUTION, CONCENTRATE INTRAVENOUS at 08:10

## 2022-04-29 RX ADMIN — PROPOFOL 20 MG: 10 INJECTION, EMULSION INTRAVENOUS at 08:29

## 2022-04-29 RX ADMIN — FAMOTIDINE 20 MG: 20 TABLET ORAL at 06:19

## 2022-04-29 NOTE — INTERVAL H&P NOTE
Update History & Physical    The Patient's History and Physical of April 28, 2022 was reviewed with the patient and I examined the patient. There was no change. The surgical site was confirmed by the patient and me. Plan:  The risk, benefits, expected outcome, and alternative to the recommended procedure have been discussed with the patient. Patient understands and wants to proceed with the procedure.     Electronically signed by Keke Horn MD on 4/29/2022 at 7:36 AM

## 2022-04-29 NOTE — DISCHARGE SUMMARY
[address] [place] [address] Surgical Specialists  Kelle Montague MD, FACS  General Surgery  Discharge Summary     Patient ID:  Kameron Ramsey  [id number]  1 y.o.  1955    Admit Date: 4/29/2022    Discharge Date: 4/29/2022    Admission Diagnoses: Port-A-Cath in place [Z95.828]    Discharge Diagnoses:    Problem List as of 4/29/2022 Date Reviewed: 3/22/2022          Codes Class Noted - Resolved    Acquired absence of genital organ ICD-10-CM: Z90.79  ICD-9-CM: V45.77  7/26/2021 - Present        Complete uterine prolapse ICD-10-CM: N81.3  ICD-9-CM: 618.1  7/26/2021 - Present        Hemorrhoids ICD-10-CM: K64.9  ICD-9-CM: 455.6  7/26/2021 - Present        Herniation of rectum into vagina ICD-10-CM: N81.6  ICD-9-CM: 618.04  7/26/2021 - Present    Overview Signed 7/26/2021 11:18 AM by Tracy Reich MD     No rectal problems. Neoplasm of skin of face ICD-10-CM: D49.2  ICD-9-CM: 239.2  7/26/2021 - Present    Overview Signed 7/26/2021 11:18 AM by Tracy Reich MD     Refer to dermatology for evaluation and treatment. Breast cancer, stage 1, estrogen receptor negative, left ([address] ICD-10-CM: C50.912, Z17.1  ICD-9-CM: 174.9, V86.1  6/10/2021 - Present        IFG (impaired fasting glucose) ICD-10-CM: R73.01  ICD-9-CM: 790.21  4/22/2021 - Present        Hyperlipidemia ICD-10-CM: E78.5  ICD-9-CM: 272.4  4/22/2021 - Present        Abnormality of left breast on screening mammogram ICD-10-CM: R92.8  ICD-9-CM: 793.80  4/22/2021 - Present        Gastroenteritis ICD-10-CM: K52.9  ICD-9-CM: 558.9  12/27/2012 - Present        Dehydration ICD-10-CM: E86.0  ICD-9-CM: 276.51  12/27/2012 - Present               Admission Condition: Good    Discharge Condition: Good    Last Procedure: Procedure(s):  RIGHT CHEST WALL MEDIPORT REMOVAL    Hospital Course:   Normal hospital course for this procedure.     Consults: None    Significant Diagnostic Studies: None    Disposition: home    Patient Instructions:   Current Discharge Medication List      START taking these medications    Details   traMADoL (ULTRAM) 50 mg tablet Take 1 Tablet by mouth every six (6) hours as needed for Pain for up to 3 days. Max Daily Amount: 200 mg. Qty: 20 Tablet, Refills: 0    Associated Diagnoses: Postoperative pain      acetaminophen (TYLENOL) 325 mg tablet Take 2 Tablets by mouth every six (6) hours. Indications: pain  Qty: 42 Tablet, Refills: 0      docusate sodium (COLACE) 100 mg capsule Take 1 Capsule by mouth two (2) times a day for 90 days. Qty: 60 Capsule, Refills: 2      bisacodyL 5 mg tab Take 5 mg by mouth daily as needed for PRN Reason (Other) (Constipation). Qty: 3 Tablet, Refills: 0         CONTINUE these medications which have NOT CHANGED    Details   b complex vitamins tablet Take 1 Tablet by mouth daily. fenofibrate nanocrystallized (TRICOR) 145 mg tablet TAKE 1 TABLET DAILY  Qty: 90 Tablet, Refills: 3    Associated Diagnoses: Hyperlipidemia, unspecified hyperlipidemia type      omega-3 acid ethyl esters (LOVAZA) 1 gram capsule Take 2 g by mouth daily (with breakfast). zinc 50 mg tab tablet Take  by mouth daily. ascorbic acid, vitamin C, (Vitamin C) 500 mg tablet Take  by mouth.      calcium citrate-vitamin d3 (CITRACAL+D) 315 mg-5 mcg (200 unit) tab Take 1 Tablet by mouth daily (with breakfast). pilocarpine (PILOCAR) 1 % ophthalmic solution Administer 1 Drop to both eyes nightly. telmisartan (MICARDIS) 20 mg tablet Take 1 Tab by mouth daily. Qty: 90 Tab, Refills: 4    Associated Diagnoses: Essential hypertension      coenzyme q10 (CO Q-10) 10 mg cap Take 100 mg by mouth.      multivitamin (ONE A DAY) tablet Take 1 Tab by mouth daily. calcium-cholecalciferol, d3, (CALCIUM 600 + D) 600-125 mg-unit tab Take  by mouth daily. lidocaine-prilocaine (EMLA) topical cream       aspirin 81 mg tablet Take 81 mg by mouth daily.            Activity: See surgical instructions  Diet: Low fat, Low cholesterol  Wound Care: As directed    Follow-up with Dr. Tien Mahmood in 2 weeks.   Follow-up tests/labs None    Signed:  Arina Marcelino MD  4/29/2022  8:45 AM

## 2022-04-29 NOTE — ANESTHESIA PREPROCEDURE EVALUATION
Relevant Problems   No relevant active problems       Anesthetic History   No history of anesthetic complications            Review of Systems / Medical History  Patient summary reviewed and pertinent labs reviewed    Pulmonary        Sleep apnea: No treatment           Neuro/Psych   Within defined limits           Cardiovascular    Hypertension          Hyperlipidemia    Exercise tolerance: >4 METS     GI/Hepatic/Renal  Within defined limits              Endo/Other        Cancer     Other Findings   Comments: H/o breast CA           Physical Exam    Airway  Mallampati: II  TM Distance: 4 - 6 cm  Neck ROM: normal range of motion   Mouth opening: Normal     Cardiovascular  Regular rate and rhythm,  S1 and S2 normal,  no murmur, click, rub, or gallop  Rhythm: regular  Rate: normal         Dental    Dentition: Lower dentition intact and Upper dentition intact     Pulmonary  Breath sounds clear to auscultation               Abdominal  GI exam deferred       Other Findings            Anesthetic Plan    ASA: 2  Anesthesia type: MAC and general - backup            Anesthetic plan and risks discussed with: Patient

## 2022-04-29 NOTE — PROGRESS NOTES
conducted a pre-surgery visit with Karis Jenkins, who is a 78 y.o.,female. The  provided the following Interventions:  Initiated a relationship of care and support. Offered prayer and assurance of continued prayers on patient's behalf. There is no advance directive present. Plan:  Chaplains will continue to follow and will provide pastoral care on an as needed/requested basis.  recommends bedside caregivers page  on duty if patient shows signs of acute spiritual or emotional distress.   Reiseñor 3   Board Certified [address] [place] [address]   [phone]

## 2022-04-29 NOTE — ANESTHESIA POSTPROCEDURE EVALUATION
Procedure(s):  RIGHT CHEST WALL MEDIPORT REMOVAL. MAC, general - backup    Anesthesia Post Evaluation      Multimodal analgesia: multimodal analgesia used between 6 hours prior to anesthesia start to PACU discharge  Patient location during evaluation: PACU  Patient participation: complete - patient participated  Level of consciousness: awake and alert  Pain management: satisfactory to patient  Airway patency: patent  Anesthetic complications: no  Cardiovascular status: acceptable  Respiratory status: acceptable  Hydration status: acceptable  Post anesthesia nausea and vomiting:  none  Final Post Anesthesia Temperature Assessment:  Normothermia (36.0-37.5 degrees C)      INITIAL Post-op Vital signs:   Vitals Value Taken Time   /48 04/29/22 0923   Temp 36.5 °C (97.7 °F) 04/29/22 0844   Pulse 53 04/29/22 0928   Resp 14 04/29/22 0928   SpO2 94 % 04/29/22 0929   Vitals shown include unvalidated device data.

## 2022-04-29 NOTE — DISCHARGE INSTRUCTIONS
[facility] [address] [facility]  Jessica Ashley MD, FACS  General Surgery    Pt may remove the dressing and shower in two days. Please use your wash clothe to wash the incision with soap and water once the dressings have been removed  No driving or operating heavy machinery while on narcotic pain medications. Please apply an ice pack to the operative site for 30 minutes 3 times daily to help reduce pain and swelling and the need for narcotic pain medication. No strenuous activity or contact sports for two weeks. No lifting greater than 15 lbs for 2 weeks. Call MD for any redness, swelling, bleeding or pus at the incision. Also call for any nausea, vomiting, increased pain or pain uncontrolled by pain medicine. DISCHARGE SUMMARY from Nurse    PATIENT INSTRUCTIONS:    After general anesthesia or intravenous sedation, for 24 hours or while taking prescription Narcotics:  · Limit your activities  · Do not drive and operate hazardous machinery  · Do not make important personal or business decisions  · Do  not drink alcoholic beverages  · If you have not urinated within 8 hours after discharge, please contact your surgeon on call. Report the following to your surgeon:  · Excessive pain, swelling, redness or odor of or around the surgical area  · Temperature over 100.5  · Nausea and vomiting lasting longer than 4 hours or if unable to take medications  · Any signs of decreased circulation or nerve impairment to extremity: change in color, persistent  numbness, tingling, coldness or increase pain  · Any questions    What to do at Home:      *  Please give a list of your current medications to your Primary Care Provider. *  Please update this list whenever your medications are discontinued, doses are      changed, or new medications (including over-the-counter products) are added. *  Please carry medication information at all times in case of emergency situations.     These are general instructions for a healthy lifestyle:    No smoking/ No tobacco products/ Avoid exposure to second hand smoke  Surgeon General's Warning:  Quitting smoking now greatly reduces serious risk to your health. Obesity, smoking, and sedentary lifestyle greatly increases your risk for illness    A healthy diet, regular physical exercise & weight monitoring are important for maintaining a healthy lifestyle    You may be retaining fluid if you have a history of heart failure or if you experience any of the following symptoms:  Weight gain of 3 pounds or more overnight or 5 pounds in a week, increased swelling in our hands or feet or shortness of breath while lying flat in bed. Please call your doctor as soon as you notice any of these symptoms; do not wait until your next office visit. The discharge information has been reviewed with the patient. The patient verbalized understanding. Discharge medications reviewed with the patient and appropriate educational materials and side effects teaching were provided.   ___________________________________________________________________________________________________________________________________

## 2022-04-29 NOTE — OP NOTES
[address] [place] [address] Dr   [address]     OPERATIVE REPORT     PATIENT: Tres Martin  MRN: [id number] DATE: 2022  [facility]LL[facility]  ROOM: OR/PL    ATTENDING: Dale Ferreira MD   DICTATING: Dale Ferreira MD     PREOPERATIVE DIAGNOSIS: left breast cancer  POSTOPERATIVE DIAGNOSIS: Same   PROCEDURE PERFORMED: Removal of MediPort  SURGEON: Tyrese De Leon MD   ASSISTANT: Heather Stein SA   ANESTHESIA: Monitored anesthesia care and local (0.25% Marcaine in 1:1 solution with 1% lidocaine with epinephrine). FINDINGS: Intact MediPort. SPECIMEN: MediPort. ESTIMATED BLOOD LOSS: 10 mL   FLUIDS GIVEN: 858 mL   COMPLICATIONS: None. INDICATION:  MediPort no longer needed. DESCRIPTION OF PROCEDURE: The patient was identified in the holding area,   where consent for MediPort removal was verified. The chest wall was marked   with the surgeon's initials to ensure correct site surgery. The patient did   receive perioperative antibiotics. In the operating room, the patient was   placed under monitored anesthesia care. Both arms were tucked. The chest   wall was prepped and draped in sterile fashion using chlorhexidine solution   and sterile drapes. The time-out was performed to ensure correct procedure. Local anesthetic was infiltrated into the skin and deep dermal tissues at   the prior incision and surrounding the port. The 15 blade was used to make   an incision in the old incision. The retractors were inserted and   dissection proceeded to remove the port and the catheter. The fibrous   capsule surrounding the port was then also removed. Hemostasis was obtained   using electrocautery. A 3-0 Vicryl suture and 4-0 Monocryl suture, along with Dermabond, were used to seal the wound. The patient tolerated the procedure very well. DISPOSITION: He was stable upon transport to the recovery room.

## 2022-04-29 NOTE — PERIOP NOTES
Assumed care of pt via stretcher. Attached to monitor. VSS. OR, MAR and anesthesia report appreciated. Will monitor.

## 2022-05-04 ENCOUNTER — OFFICE VISIT (OUTPATIENT)
Dept: FAMILY MEDICINE CLINIC | Age: 67
End: 2022-05-04
Payer: MEDICARE

## 2022-05-04 VITALS
HEART RATE: 77 BPM | DIASTOLIC BLOOD PRESSURE: 81 MMHG | BODY MASS INDEX: 24.07 KG/M2 | SYSTOLIC BLOOD PRESSURE: 146 MMHG | RESPIRATION RATE: 18 BRPM | OXYGEN SATURATION: 96 % | WEIGHT: 140.2 LBS

## 2022-05-04 DIAGNOSIS — R73.01 IFG (IMPAIRED FASTING GLUCOSE): ICD-10-CM

## 2022-05-04 DIAGNOSIS — Z00.00 MEDICARE ANNUAL WELLNESS VISIT, SUBSEQUENT: Primary | ICD-10-CM

## 2022-05-04 DIAGNOSIS — Z71.89 ACP (ADVANCE CARE PLANNING): ICD-10-CM

## 2022-05-04 DIAGNOSIS — I10 ESSENTIAL HYPERTENSION: ICD-10-CM

## 2022-05-04 DIAGNOSIS — E78.5 HYPERLIPIDEMIA, UNSPECIFIED HYPERLIPIDEMIA TYPE: ICD-10-CM

## 2022-05-04 PROCEDURE — G8420 CALC BMI NORM PARAMETERS: HCPCS | Performed by: FAMILY MEDICINE

## 2022-05-04 PROCEDURE — G8427 DOCREV CUR MEDS BY ELIG CLIN: HCPCS | Performed by: FAMILY MEDICINE

## 2022-05-04 PROCEDURE — 99214 OFFICE O/P EST MOD 30 MIN: CPT | Performed by: FAMILY MEDICINE

## 2022-05-04 PROCEDURE — G8399 PT W/DXA RESULTS DOCUMENT: HCPCS | Performed by: FAMILY MEDICINE

## 2022-05-04 PROCEDURE — G0439 PPPS, SUBSEQ VISIT: HCPCS | Performed by: FAMILY MEDICINE

## 2022-05-04 PROCEDURE — 1090F PRES/ABSN URINE INCON ASSESS: CPT | Performed by: FAMILY MEDICINE

## 2022-05-04 PROCEDURE — 1101F PT FALLS ASSESS-DOCD LE1/YR: CPT | Performed by: FAMILY MEDICINE

## 2022-05-04 PROCEDURE — 99497 ADVNCD CARE PLAN 30 MIN: CPT | Performed by: FAMILY MEDICINE

## 2022-05-04 PROCEDURE — G9899 SCRN MAM PERF RSLTS DOC: HCPCS | Performed by: FAMILY MEDICINE

## 2022-05-04 PROCEDURE — G8432 DEP SCR NOT DOC, RNG: HCPCS | Performed by: FAMILY MEDICINE

## 2022-05-04 PROCEDURE — G8536 NO DOC ELDER MAL SCRN: HCPCS | Performed by: FAMILY MEDICINE

## 2022-05-04 PROCEDURE — G8754 DIAS BP LESS 90: HCPCS | Performed by: FAMILY MEDICINE

## 2022-05-04 PROCEDURE — G8753 SYS BP > OR = 140: HCPCS | Performed by: FAMILY MEDICINE

## 2022-05-04 PROCEDURE — 3017F COLORECTAL CA SCREEN DOC REV: CPT | Performed by: FAMILY MEDICINE

## 2022-05-04 NOTE — PROGRESS NOTES
Chelsi Sousa presents today for   Chief Complaint   Patient presents with    Blood sugar problem    Hypertension    Cholesterol Problem    Labs    Annual Wellness Visit       Is someone accompanying this pt? No     Is the patient using any DME equipment during OV? No     Depression Screening:  3 most recent PHQ Screens 2/4/2022   Little interest or pleasure in doing things Several days   Feeling down, depressed, irritable, or hopeless Several days   Total Score PHQ 2 2       Learning Assessment:  Learning Assessment 1/9/2020   PRIMARY LEARNER Patient   HIGHEST LEVEL OF EDUCATION - PRIMARY LEARNER  SOME COLLEGE   BARRIERS PRIMARY LEARNER NONE   CO-LEARNER CAREGIVER No   PRIMARY LANGUAGE ENGLISH   LEARNER PREFERENCE PRIMARY LISTENING   ANSWERED BY self   RELATIONSHIP SELF       Abuse Screening:  Abuse Screening Questionnaire 7/26/2021   Do you ever feel afraid of your partner? N   Are you in a relationship with someone who physically or mentally threatens you? N   Is it safe for you to go home? Y       Fall Screening  Fall Risk Assessment, last 12 mths 3/22/2022   Able to walk? Yes   Fall in past 12 months? 0   Do you feel unsteady? 0   Are you worried about falling 0   Is TUG test greater than 12 seconds? -   Is the gait abnormal? -   Number of falls in past 12 months -       Generalized Anxiety  No flowsheet data found. Health Maintenance Due   Topic Date Due    Medicare Yearly Exam  Never done   . Health Maintenance reviewed and discussed and ordered per Provider. Vaccines Due   Screenings Due       Chelsi Sousa is updated on all HM    1. \"Have you been to the ER, urgent care clinic since your last visit? Hospitalized since your last visit? \" No    2. \"Have you seen or consulted any other health care providers outside of the [address] [place] [address] since your last visit? \" No     3. For patients aged 39-70: Has the patient had a colonoscopy / FIT/ Cologuard?  Yes - no Care Gap present     If the patient is female:    4. For patients aged 41-77: Has the patient had a mammogram within the past 2 years? Yes - no Care Gap present    5. For patients aged 21-65: Has the patient had a pap smear? Yes - no Care Gap present           This is the Subsequent Medicare Annual Wellness Exam, performed 12 months or more after the Initial AWV or the last Subsequent AWV    I have reviewed the patient's medical history in detail and updated the computerized patient record. Assessment/Plan   Education and counseling provided:  End-of-Life planning (with patient's consent)    1. Medicare annual wellness visit, subsequent  2. ACP (advance care planning)       Depression Risk Factor Screening     3 most recent PHQ Screens 2/4/2022   Little interest or pleasure in doing things Several days   Feeling down, depressed, irritable, or hopeless Several days   Total Score PHQ 2 2       Alcohol & Drug Abuse Risk Screen    Do you average more than 1 drink per night or more than 7 drinks a week: no   On any one occasion in the past three months have you have had more than 3 drinks containing alcohol:  No           Functional Ability and Level of Safety    Hearing: Hearing is good. Activities of Daily Living: The home contains: no safety equipment. Patient does total self care      Ambulation: with no difficulty     Fall Risk:  Fall Risk Assessment, last 12 mths 3/22/2022   Able to walk? Yes   Fall in past 12 months? 0   Do you feel unsteady? 0   Are you worried about falling 0   Is TUG test greater than 12 seconds? -   Is the gait abnormal? -   Number of falls in past 12 months -      Abuse Screen:  Patient is not abused       Cognitive Screening    Has your family/caregiver stated any concerns about your memory: no     Cognitive Screening: .     Health Maintenance Due     Health Maintenance Due   Topic Date Due    Medicare Yearly Exam  Never done       Patient Care Team   Patient Care Team:  Uli Richard MD as PCP - General (Family Medicine)  Taurus Bosch MD as PCP - [address] [place] [address] Dr Sung Provider  Diana Sanchez MD as Surgeon (General Surgery)    History     Patient Active Problem List   Diagnosis Code    Gastroenteritis K52.9    Dehydration E86.0    IFG (impaired fasting glucose) R73.01    Hyperlipidemia E78.5    Abnormality of left breast on screening mammogram R92.8    Breast cancer, stage 1, estrogen receptor negative, left ([facility] [address] C50.912, Z17.1    Acquired absence of genital organ Z90.79    Complete uterine prolapse N81.3    Hemorrhoids K64.9    Herniation of rectum into vagina N81.6    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     Past Medical History:   Diagnosis Date    Cancer ([facility] [address]     left breast    High cholesterol     Hypertension     Sleep apnea     patient denies      Past Surgical History:   Procedure Laterality Date    HX BLADDER SUSPENSION      HX BREAST BIOPSY Left 6/10/2021    TAG LOCALIZED LUMPECTOMY LEFT BREAST WITH LEFT AXILLARY SENTINEL LYMPH NODE BIOPSY (TAG 6.7.21 [facility] 1300; SLNB 6.9.21 [facility] 1030) performed by Diana Sanchez MD at [address] [place] [address] HX GYN      hysterectomy    HX HEENT Bilateral 05/13/2021    Bilateral Laser surgery: Anatomical narrow angle, bilateral    HX OOPHORECTOMY Left     IA BREAST SURGERY PROCEDURE UNLISTED  2000    right lumpectomy     Current Outpatient Medications   Medication Sig Dispense Refill    acetaminophen (TYLENOL) 325 mg tablet Take 2 Tablets by mouth every six (6) hours. Indications: pain 42 Tablet 0    docusate sodium (COLACE) 100 mg capsule Take 1 Capsule by mouth two (2) times a day for 90 days. 60 Capsule 2    bisacodyL 5 mg tab Take 5 mg by mouth daily as needed for PRN Reason (Other) (Constipation). 3 Tablet 0    b complex vitamins tablet Take 1 Tablet by mouth daily.       lidocaine-prilocaine (EMLA) topical cream       fenofibrate nanocrystallized (TRICOR) 145 mg tablet TAKE 1 TABLET DAILY 90 Tablet 3    omega-3 acid ethyl esters (LOVAZA) 1 gram capsule Take 2 g by mouth daily (with breakfast).  zinc 50 mg tab tablet Take  by mouth daily.  ascorbic acid, vitamin C, (Vitamin C) 500 mg tablet Take  by mouth.  calcium citrate-vitamin d3 (CITRACAL+D) 315 mg-5 mcg (200 unit) tab Take 1 Tablet by mouth daily (with breakfast).  pilocarpine (PILOCAR) 1 % ophthalmic solution Administer 1 Drop to both eyes nightly.  telmisartan (MICARDIS) 20 mg tablet Take 1 Tab by mouth daily. 90 Tab 4    coenzyme q10 (CO Q-10) 10 mg cap Take 100 mg by mouth.  multivitamin (ONE A DAY) tablet Take 1 Tab by mouth daily.  aspirin 81 mg tablet Take 81 mg by mouth daily.  calcium-cholecalciferol, d3, (CALCIUM 600 + D) 600-125 mg-unit tab Take  by mouth daily.        No Known Allergies    Family History   Problem Relation Age of Onset    Dementia Mother     Hypertension Mother     High Cholesterol Mother     Other Mother         pre-dm    No Known Problems Son     No Known Problems Son     Other Father         homicide    Other Sister         AIDS    No Known Problems Brother     COPD Sister     No Known Problems Sister      Social History     Tobacco Use    Smoking status: Never Smoker    Smokeless tobacco: Never Used   Substance Use Topics    Alcohol use: Yes     Comment: parisa Noble LPN     Signed By: Ciarra Banegas MD     May 4, 2022

## 2022-05-04 NOTE — PATIENT INSTRUCTIONS

## 2022-05-04 NOTE — PROGRESS NOTES
Chief Complaint   Patient presents with    Blood sugar problem    Hypertension    Cholesterol Problem    Labs    Annual Wellness Visit         HPI    Vanessa Mcintosh is a 78 y.o. female presenting today for 3 months  follow up of ifg, htn, hld. Pt is doing well overall. Her port has been removed. She will have a mammogram soon. Patient had labs on 4/28/22. Labs reviewed in detail with patient     Patient does not need medication refills today. New concerns today: none      Review of Systems   Constitutional: Negative. HENT: Negative. Respiratory: Negative. Cardiovascular: Negative. All other systems reviewed and are negative. Physical Exam  Vitals and nursing note reviewed. Constitutional:       Appearance: Normal appearance. She is not ill-appearing. HENT:      Head: Normocephalic and atraumatic. Right Ear: External ear normal.      Left Ear: External ear normal.      Nose: Nose normal.      Mouth/Throat:      Mouth: Mucous membranes are moist.   Eyes:      Extraocular Movements: Extraocular movements intact. Conjunctiva/sclera: Conjunctivae normal.   Cardiovascular:      Rate and Rhythm: Normal rate and regular rhythm. Heart sounds: No murmur heard. No friction rub. No gallop. Pulmonary:      Effort: Pulmonary effort is normal.      Breath sounds: Normal breath sounds. No wheezing, rhonchi or rales. Musculoskeletal:         General: Normal range of motion. Cervical back: Normal range of motion. Skin:     General: Skin is warm and dry. Neurological:      Mental Status: She is alert and oriented to person, place, and time. Coordination: Coordination normal.   Psychiatric:         Mood and Affect: Mood normal.         Behavior: Behavior normal.         Thought Content: Thought content normal.         Judgment: Judgment normal.         Diagnoses and all orders for this visit:    1. IFG (impaired fasting glucose)  Improved. Pt commended.       2. Essential hypertension  Stable, cont pres tx plan. 3. Hyperlipidemia, unspecified hyperlipidemia type  Stable, cont pres tx plan. Follow-up and Dispositions    · Return in about 3 months (around 8/4/2022). pt plans to move to [place] on 7/1/22.

## 2022-05-04 NOTE — PROGRESS NOTES
Advance Care Planning     Advance Care Planning (ACP) Physician/NP/PA Conversation      Date of Conversation: 5/4/2022  Conducted with: Patient with Decision Making Capacity    Healthcare Decision Maker:   No healthcare decision makers have been documented. Click here to complete [place] including selection of the Healthcare Decision Maker Relationship (ie \"Primary\")    Today we documented Decision Maker(s) consistent with Legal Next of Kin hierarchy. Care Preferences:    Hospitalization: \"If your health worsens and it becomes clear that your chance of recovery is unlikely, what would be your preference regarding hospitalization? \"  The patient would prefer comfort-focused treatment without hospitalization. Ventilation: \"If you were unable to breathe on your own and your chance of recovery was unlikely, what would be your preference about the use of a ventilator (breathing machine) if it was available to you? \"   The patient would NOT desire the use of a ventilator. Resuscitation: \"In the event your heart stopped as a result of an underlying serious health condition, would you want attempts to be made to restart your heart, or would you prefer a natural death? \"   No, do NOT attempt to resuscitate. Conversation Outcomes / Follow-Up Plan:   ACP in process - information provided, considering goals and options  Reviewed DNR/DNI and patient elects Full Code (Attempt Resuscitation)     Length of Voluntary ACP Conversation in minutes:  16 minutes    Lesley Andrea MD     advance directive or to orally revoke that prior directive (refer to ACP Clinical Specialist).  (Optional):111}

## 2022-05-13 ENCOUNTER — OFFICE VISIT (OUTPATIENT)
Dept: SURGERY | Age: 67
End: 2022-05-13
Payer: MEDICARE

## 2022-05-13 VITALS
HEART RATE: 81 BPM | HEIGHT: 64 IN | SYSTOLIC BLOOD PRESSURE: 132 MMHG | TEMPERATURE: 98.3 F | DIASTOLIC BLOOD PRESSURE: 72 MMHG | OXYGEN SATURATION: 97 % | RESPIRATION RATE: 17 BRPM | WEIGHT: 140 LBS | BODY MASS INDEX: 23.9 KG/M2

## 2022-05-13 DIAGNOSIS — Z09 POSTOPERATIVE EXAMINATION: Primary | ICD-10-CM

## 2022-05-13 DIAGNOSIS — N18.30 STAGE 3 CHRONIC KIDNEY DISEASE, UNSPECIFIED WHETHER STAGE 3A OR 3B CKD (HCC): ICD-10-CM

## 2022-05-13 PROCEDURE — 99024 POSTOP FOLLOW-UP VISIT: CPT | Performed by: SURGERY

## 2022-05-13 NOTE — PROGRESS NOTES
[facility]  General Surgery    Name: Mercedes Mondragon MRN: [id number]   : 1955 Hospital: [facility]   Date: 2022 Admission Date: No admission date for patient encounter. Subjective:  Patient presents today without complaints. She has done well since the right chest wall Mediport was removed. She has a history of left breast cancer triple negative stage I with completion of adjuvant radiation therapy and chemotherapy. Objective:  Vitals:    22 0952 22 0953   BP: (!) 150/68 132/72   Pulse: 81    Resp: 17    Temp: 98.3 °F (36.8 °C)    TempSrc: Temporal    SpO2: 97%    Weight: 63.5 kg (140 lb)    Height: 5' 4\" (1.626 m)        Physical Exam:    General: Awake and alert, oriented x4, no apparent distress   Right chest wall: Incision is clean dry and intact. Minimal ecchymosis. No cellulitis fluctuance or crepitance. Current Medications:  Current Outpatient Medications   Medication Sig Dispense Refill    calcium-cholecalciferol, d3, (CALCIUM 600 + D) 600-125 mg-unit tab Take  by mouth daily.  b complex vitamins tablet Take 1 Tablet by mouth daily.  fenofibrate nanocrystallized (TRICOR) 145 mg tablet TAKE 1 TABLET DAILY 90 Tablet 3    omega-3 acid ethyl esters (LOVAZA) 1 gram capsule Take 2 g by mouth daily (with breakfast).  zinc 50 mg tab tablet Take  by mouth daily.  ascorbic acid, vitamin C, (Vitamin C) 500 mg tablet Take  by mouth.  calcium citrate-vitamin d3 (CITRACAL+D) 315 mg-5 mcg (200 unit) tab Take 1 Tablet by mouth daily (with breakfast).  pilocarpine (PILOCAR) 1 % ophthalmic solution Administer 1 Drop to both eyes nightly.  telmisartan (MICARDIS) 20 mg tablet Take 1 Tab by mouth daily. 90 Tab 4    coenzyme q10 (CO Q-10) 10 mg cap Take 100 mg by mouth.  multivitamin (ONE A DAY) tablet Take 1 Tab by mouth daily.  aspirin 81 mg tablet Take 81 mg by mouth daily.       acetaminophen (TYLENOL) 325 mg tablet Take 2 Tablets by mouth every six (6) hours. Indications: pain 42 Tablet 0    docusate sodium (COLACE) 100 mg capsule Take 1 Capsule by mouth two (2) times a day for 90 days. 60 Capsule 2    bisacodyL 5 mg tab Take 5 mg by mouth daily as needed for PRN Reason (Other) (Constipation). 3 Tablet 0    lidocaine-prilocaine (EMLA) topical cream          Chart and notes reviewed. Data reviewed. I have evaluated and examined the patient. IMPRESSION:   · Patient with history of triple negative left breast cancer status post completion of adjuvant radiation therapy and chemotherapy doing well after Mediport removal.      PLAN:/DISCUSION:   · Follow-up next month as regularly scheduled for breast care. · Please call or visit with any questions or concerns.         Poonam Ferrari MD

## 2022-05-20 ENCOUNTER — HOSPITAL ENCOUNTER (OUTPATIENT)
Dept: MAMMOGRAPHY | Age: 67
Discharge: HOME | End: 2022-05-20
Attending: RADIOLOGY
Payer: MEDICARE

## 2022-05-20 DIAGNOSIS — Z12.31 SCREENING MAMMOGRAM FOR HIGH-RISK PATIENT: ICD-10-CM

## 2022-05-20 DIAGNOSIS — C50.812 MALIGNANT NEOPLASM OF OVERLAPPING SITES OF LEFT FEMALE BREAST (HCC): ICD-10-CM

## 2022-05-20 PROCEDURE — 77063 BREAST TOMOSYNTHESIS BI: CPT

## 2022-05-24 ENCOUNTER — PATIENT MESSAGE (OUTPATIENT)
Dept: FAMILY MEDICINE CLINIC | Age: 67
End: 2022-05-24

## 2022-05-24 DIAGNOSIS — R92.8 ABNORMALITY OF RIGHT BREAST ON SCREENING MAMMOGRAM: Primary | ICD-10-CM

## 2022-05-24 NOTE — TELEPHONE ENCOUNTER
From: Khadijah Chu  To: Kehinde Solis MD  Sent: 5/24/2022 1:33 PM EDT  Subject: Understanding Abdias Cifuentes. I received the results of my mammogram I had on May 20th. I do not understand what it is saying. Would you be so kind and decipher this for me in plain English please? If I have to have an ultrasound on my right breast, can we please get this scheduled ASAP as our time here in [place] is short. Thank you very much for your time.     Viral Wagner

## 2022-06-07 ENCOUNTER — OFFICE VISIT (OUTPATIENT)
Dept: SURGERY | Age: 67
End: 2022-06-07
Payer: MEDICARE

## 2022-06-07 VITALS
SYSTOLIC BLOOD PRESSURE: 138 MMHG | DIASTOLIC BLOOD PRESSURE: 78 MMHG | TEMPERATURE: 98.5 F | HEIGHT: 64 IN | RESPIRATION RATE: 16 BRPM | HEART RATE: 78 BPM | BODY MASS INDEX: 23.9 KG/M2 | WEIGHT: 140 LBS | OXYGEN SATURATION: 97 %

## 2022-06-07 DIAGNOSIS — R92.8 ABNORMAL MAMMOGRAM OF LEFT BREAST: Primary | ICD-10-CM

## 2022-06-07 PROCEDURE — G8536 NO DOC ELDER MAL SCRN: HCPCS | Performed by: SURGERY

## 2022-06-07 PROCEDURE — G8754 DIAS BP LESS 90: HCPCS | Performed by: SURGERY

## 2022-06-07 PROCEDURE — G9899 SCRN MAM PERF RSLTS DOC: HCPCS | Performed by: SURGERY

## 2022-06-07 PROCEDURE — G8752 SYS BP LESS 140: HCPCS | Performed by: SURGERY

## 2022-06-07 PROCEDURE — 1123F ACP DISCUSS/DSCN MKR DOCD: CPT | Performed by: SURGERY

## 2022-06-07 PROCEDURE — G8432 DEP SCR NOT DOC, RNG: HCPCS | Performed by: SURGERY

## 2022-06-07 PROCEDURE — 99213 OFFICE O/P EST LOW 20 MIN: CPT | Performed by: SURGERY

## 2022-06-07 PROCEDURE — 1090F PRES/ABSN URINE INCON ASSESS: CPT | Performed by: SURGERY

## 2022-06-07 PROCEDURE — G8420 CALC BMI NORM PARAMETERS: HCPCS | Performed by: SURGERY

## 2022-06-07 PROCEDURE — 1101F PT FALLS ASSESS-DOCD LE1/YR: CPT | Performed by: SURGERY

## 2022-06-07 PROCEDURE — G8427 DOCREV CUR MEDS BY ELIG CLIN: HCPCS | Performed by: SURGERY

## 2022-06-07 PROCEDURE — G8399 PT W/DXA RESULTS DOCUMENT: HCPCS | Performed by: SURGERY

## 2022-06-07 PROCEDURE — 3017F COLORECTAL CA SCREEN DOC REV: CPT | Performed by: SURGERY

## 2022-06-07 NOTE — PROGRESS NOTES
[facility]  General Surgery    Subjective:  Patient with history of left lumpectomy approximately 1 year ago. She recently underwent bilateral 3D screening mammography May 2022 which revealed a mass in the upper outer quadrant of the right breast 10 o'clock position measuring 0.7 x 0.5 x 0.5 cm 10 cm from the nipple. Patient denies any breast pain unintentional weight loss skin changes of the breast or nipple drainage. Objective:  Vitals:    06/07/22 1254   BP: 138/78   Pulse: 78   Resp: 16   Temp: 98.5 °F (36.9 °C)   TempSrc: Temporal   SpO2: 97%   Weight: 63.5 kg (140 lb)   Height: 5' 4\" (1.626 m)       Physical Exam:    General: Awake and alert, oriented x4, no apparent distress   Breasts:    Left: No dimpling, discoloration, nipple inversion or retractions. No axillary or supraclavicular lymphadenopathy. No mass   Right: No dimpling, discoloration, nipple inversion or retractions. No axillary or supraclavicular lymphadenopathy. No mass    Current Medications:  Current Outpatient Medications   Medication Sig Dispense Refill    calcium-cholecalciferol, d3, (CALCIUM 600 + D) 600-125 mg-unit tab Take  by mouth daily.  b complex vitamins tablet Take 1 Tablet by mouth daily.  fenofibrate nanocrystallized (TRICOR) 145 mg tablet TAKE 1 TABLET DAILY 90 Tablet 3    omega-3 acid ethyl esters (LOVAZA) 1 gram capsule Take 2 g by mouth daily (with breakfast).  zinc 50 mg tab tablet Take  by mouth daily.  ascorbic acid, vitamin C, (Vitamin C) 500 mg tablet Take  by mouth.  calcium citrate-vitamin d3 (CITRACAL+D) 315 mg-5 mcg (200 unit) tab Take 1 Tablet by mouth daily (with breakfast).  pilocarpine (PILOCAR) 1 % ophthalmic solution Administer 1 Drop to both eyes nightly.  telmisartan (MICARDIS) 20 mg tablet Take 1 Tab by mouth daily. 90 Tab 4    coenzyme q10 (CO Q-10) 10 mg cap Take 100 mg by mouth.       multivitamin (ONE A DAY) tablet Take 1 Tab by mouth daily.  aspirin 81 mg tablet Take 81 mg by mouth daily. Chart and notes reviewed. Data reviewed. I have evaluated and examined the patient.         Impression and plan:  Patient with BI-RADS 0 changes right breast upper outer quadrant  Diagnostic imaging has been ordered-mammogram and ultrasound right breast  we will call the patient     Olga Romo MD

## 2022-06-10 ENCOUNTER — HOSPITAL ENCOUNTER (OUTPATIENT)
Dept: ULTRASOUND IMAGING | Age: 67
Discharge: HOME | End: 2022-06-10
Attending: FAMILY MEDICINE
Payer: MEDICARE

## 2022-06-10 ENCOUNTER — HOSPITAL ENCOUNTER (OUTPATIENT)
Dept: MAMMOGRAPHY | Age: 67
Discharge: HOME | End: 2022-06-10
Attending: FAMILY MEDICINE
Payer: MEDICARE

## 2022-06-10 DIAGNOSIS — R92.8 ABNORMALITY OF RIGHT BREAST ON SCREENING MAMMOGRAM: ICD-10-CM

## 2022-06-10 PROCEDURE — 76642 ULTRASOUND BREAST LIMITED: CPT

## 2022-06-10 PROCEDURE — 77061 BREAST TOMOSYNTHESIS UNI: CPT

## 2022-06-15 ENCOUNTER — TELEPHONE (OUTPATIENT)
Dept: FAMILY MEDICINE CLINIC | Age: 67
End: 2022-06-15

## 2022-06-15 NOTE — TELEPHONE ENCOUNTER
----- Message from Kenyon Jeffery MD sent at 6/13/2022 12:15 AM EDT -----  Please ensure patient aware of results and need for 6-month follow-up after she moves to [place].

## 2022-06-24 ENCOUNTER — OFFICE VISIT (OUTPATIENT)
Dept: FAMILY MEDICINE CLINIC | Age: 67
End: 2022-06-24
Payer: MEDICARE

## 2022-06-24 VITALS
RESPIRATION RATE: 16 BRPM | OXYGEN SATURATION: 97 % | SYSTOLIC BLOOD PRESSURE: 129 MMHG | TEMPERATURE: 98.3 F | HEIGHT: 64 IN | HEART RATE: 87 BPM | BODY MASS INDEX: 24.14 KG/M2 | DIASTOLIC BLOOD PRESSURE: 79 MMHG | WEIGHT: 141.4 LBS

## 2022-06-24 DIAGNOSIS — L23.7 POISON IVY DERMATITIS: Primary | ICD-10-CM

## 2022-06-24 DIAGNOSIS — N18.30 STAGE 3 CHRONIC KIDNEY DISEASE, UNSPECIFIED WHETHER STAGE 3A OR 3B CKD (HCC): ICD-10-CM

## 2022-06-24 PROCEDURE — 1090F PRES/ABSN URINE INCON ASSESS: CPT | Performed by: FAMILY MEDICINE

## 2022-06-24 PROCEDURE — 99213 OFFICE O/P EST LOW 20 MIN: CPT | Performed by: FAMILY MEDICINE

## 2022-06-24 PROCEDURE — G8752 SYS BP LESS 140: HCPCS | Performed by: FAMILY MEDICINE

## 2022-06-24 PROCEDURE — G9899 SCRN MAM PERF RSLTS DOC: HCPCS | Performed by: FAMILY MEDICINE

## 2022-06-24 PROCEDURE — 1123F ACP DISCUSS/DSCN MKR DOCD: CPT | Performed by: FAMILY MEDICINE

## 2022-06-24 PROCEDURE — 3017F COLORECTAL CA SCREEN DOC REV: CPT | Performed by: FAMILY MEDICINE

## 2022-06-24 PROCEDURE — G8510 SCR DEP NEG, NO PLAN REQD: HCPCS | Performed by: FAMILY MEDICINE

## 2022-06-24 PROCEDURE — G8427 DOCREV CUR MEDS BY ELIG CLIN: HCPCS | Performed by: FAMILY MEDICINE

## 2022-06-24 PROCEDURE — G8399 PT W/DXA RESULTS DOCUMENT: HCPCS | Performed by: FAMILY MEDICINE

## 2022-06-24 PROCEDURE — 1101F PT FALLS ASSESS-DOCD LE1/YR: CPT | Performed by: FAMILY MEDICINE

## 2022-06-24 PROCEDURE — G8754 DIAS BP LESS 90: HCPCS | Performed by: FAMILY MEDICINE

## 2022-06-24 PROCEDURE — G8420 CALC BMI NORM PARAMETERS: HCPCS | Performed by: FAMILY MEDICINE

## 2022-06-24 PROCEDURE — G8536 NO DOC ELDER MAL SCRN: HCPCS | Performed by: FAMILY MEDICINE

## 2022-06-24 RX ORDER — METHYLPREDNISOLONE 4 MG/1
TABLET ORAL
Qty: 1 DOSE PACK | Refills: 0 | Status: SHIPPED | OUTPATIENT
Start: 2022-06-24

## 2022-06-24 NOTE — PROGRESS NOTES
Chief Complaint   Patient presents with    Rash     arms, itchy. First noticed it last Sun     Subjective  Sivakumar Mercado is a 78 y.o. female. HPI   Pt here for eval of rash that she noticed 5-6 days ago. She was visiting her sister in [place] and pulled a weed from a bush. The rash is very itchy. She is using otc topical meds. Review of Systems   Constitutional: Negative. HENT: Negative. Respiratory: Negative. Cardiovascular: Negative. Skin: Positive for itching and rash. All other systems reviewed and are negative. Objective  Physical Exam  Vitals and nursing note reviewed. Constitutional:       Appearance: Normal appearance. She is not ill-appearing. HENT:      Head: Normocephalic and atraumatic. Right Ear: External ear normal.      Left Ear: External ear normal.      Nose: Nose normal.      Mouth/Throat:      Mouth: Mucous membranes are moist.   Eyes:      Extraocular Movements: Extraocular movements intact. Conjunctiva/sclera: Conjunctivae normal.   Cardiovascular:      Rate and Rhythm: Normal rate and regular rhythm. Heart sounds: No murmur heard. No friction rub. No gallop. Pulmonary:      Effort: Pulmonary effort is normal.      Breath sounds: Normal breath sounds. No wheezing, rhonchi or rales. Musculoskeletal:         General: Normal range of motion. Cervical back: Normal range of motion. Skin:     General: Skin is warm and dry. Neurological:      Mental Status: She is alert and oriented to person, place, and time. Coordination: Coordination normal.   Psychiatric:         Mood and Affect: Mood normal.         Behavior: Behavior normal.         Thought Content: Thought content normal.         Judgment: Judgment normal.          Assessment & Plan  Diagnoses and all orders for this visit:    1. Poison ivy dermatitis  -     methylPREDNISolone (MEDROL DOSEPACK) 4 mg tablet; Use as directed.     2. Stage 3 chronic kidney disease, unspecified whether stage 3a or 3b CKD (HCC)  Stable, cont pres tx plan. Follow-up and Dispositions    · Return if symptoms worsen or fail to improve.        Nicol Brennan MD

## 2022-06-24 NOTE — PROGRESS NOTES
Khadijah Chu presents today for   Chief Complaint   Patient presents with    Rash     arms, itchy. First noticed it last Evander Margarito       Is someone accompanying this pt? no    Is the patient using any DME equipment during [address]? no    Depression Screening:  3 most recent PHQ Screens 6/24/2022   Little interest or pleasure in doing things Not at all   Feeling down, depressed, irritable, or hopeless Not at all   Total Score PHQ 2 0       Learning Assessment:  Learning Assessment 6/24/2022   PRIMARY LEARNER Patient   HIGHEST LEVEL OF EDUCATION - PRIMARY LEARNER  2 YEARS Ayanna PRIMARY LEARNER NONE   CO-LEARNER CAREGIVER No   PRIMARY LANGUAGE ENGLISH    NEED No   LEARNER PREFERENCE PRIMARY DEMONSTRATION   ANSWERED BY patient   RELATIONSHIP SELF       Abuse Screening:  Abuse Screening Questionnaire 6/24/2022   Do you ever feel afraid of your partner? N   Are you in a relationship with someone who physically or mentally threatens you? N   Is it safe for you to go home? Y       Fall Screening  Fall Risk Assessment, last 12 mths 6/7/2022   Able to walk? Yes   Fall in past 12 months? 0   Do you feel unsteady? 0   Are you worried about falling 0   Is TUG test greater than 12 seconds? -   Is the gait abnormal? -   Number of falls in past 12 months -       Generalized Anxiety  No flowsheet data found. Health Maintenance Due   Topic Date Due    COVID-19 Vaccine (3 - Booster for Blackmon Peter series) 05/24/2022   . Health Maintenance reviewed and discussed and ordered per Provider. Coordination of Care  1. Have you been to the ER, urgent care clinic since your last visit? Hospitalized since your last visit? no    2. Have you seen or consulted any other health care providers outside of the [address] [place] [address] since your last visit? Include any pap smears or colon screening.  no

## 2022-06-27 DIAGNOSIS — I10 ESSENTIAL HYPERTENSION: ICD-10-CM

## 2022-06-27 RX ORDER — TELMISARTAN 20 MG/1
TABLET ORAL
Qty: 90 TABLET | Refills: 3 | Status: SHIPPED | OUTPATIENT
Start: 2022-06-27

## 2022-07-01 ENCOUNTER — LAB ONLY (OUTPATIENT)
Dept: ONCOLOGY | Age: 67
End: 2022-07-01

## 2022-07-01 ENCOUNTER — HOSPITAL ENCOUNTER (OUTPATIENT)
Dept: LAB | Age: 67
Discharge: HOME | End: 2022-07-01
Payer: MEDICARE

## 2022-07-01 DIAGNOSIS — D64.9 NORMOCYTIC ANEMIA: ICD-10-CM

## 2022-07-01 DIAGNOSIS — R91.1 SOLITARY PULMONARY NODULE: ICD-10-CM

## 2022-07-01 DIAGNOSIS — C50.912 BREAST CANCER, STAGE 1, ESTROGEN RECEPTOR NEGATIVE, LEFT (HCC): ICD-10-CM

## 2022-07-01 DIAGNOSIS — C50.919 TRIPLE NEGATIVE MALIGNANT NEOPLASM OF BREAST (HCC): ICD-10-CM

## 2022-07-01 DIAGNOSIS — C50.919 TRIPLE NEGATIVE MALIGNANT NEOPLASM OF BREAST (HCC): Primary | ICD-10-CM

## 2022-07-01 DIAGNOSIS — Z17.1 BREAST CANCER, STAGE 1, ESTROGEN RECEPTOR NEGATIVE, LEFT (HCC): ICD-10-CM

## 2022-07-01 LAB
ALBUMIN SERPL-MCNC: 4.2 G/DL (ref 3.4–5)
ALBUMIN/GLOB SERPL: 1.4 {RATIO} (ref 0.8–1.7)
ALP SERPL-CCNC: 104 U/L (ref 45–117)
ALT SERPL-CCNC: 26 U/L (ref 13–56)
ANION GAP SERPL CALC-SCNC: 2 MMOL/L (ref 3–18)
AST SERPL-CCNC: 19 U/L (ref 10–38)
BASOPHILS # BLD: 0.1 K/UL (ref 0–0.1)
BASOPHILS NFR BLD: 1 % (ref 0–2)
BILIRUB SERPL-MCNC: 0.4 MG/DL (ref 0.2–1)
BUN SERPL-MCNC: 27 MG/DL (ref 7–18)
BUN/CREAT SERPL: 20 (ref 12–20)
CALCIUM SERPL-MCNC: 10.1 MG/DL (ref 8.5–10.1)
CHLORIDE SERPL-SCNC: 110 MMOL/L (ref 100–111)
CO2 SERPL-SCNC: 31 MMOL/L (ref 21–32)
CREAT SERPL-MCNC: 1.35 MG/DL (ref 0.6–1.3)
DIFFERENTIAL METHOD BLD: ABNORMAL
EOSINOPHIL # BLD: 0.1 K/UL (ref 0–0.4)
EOSINOPHIL NFR BLD: 2 % (ref 0–5)
ERYTHROCYTE [DISTWIDTH] IN BLOOD BY AUTOMATED COUNT: 13.3 % (ref 11.6–14.5)
FERRITIN SERPL-MCNC: 89 NG/ML (ref 8–388)
GLOBULIN SER CALC-MCNC: 3.1 G/DL (ref 2–4)
GLUCOSE SERPL-MCNC: 108 MG/DL (ref 74–99)
HCT VFR BLD AUTO: 43.3 % (ref 35–45)
HGB BLD-MCNC: 13.7 G/DL (ref 12–16)
IMM GRANULOCYTES # BLD AUTO: 0.1 K/UL (ref 0–0.04)
IMM GRANULOCYTES NFR BLD AUTO: 1 % (ref 0–0.5)
IRON SATN MFR SERPL: 20 % (ref 20–50)
IRON SERPL-MCNC: 91 UG/DL (ref 50–175)
LYMPHOCYTES # BLD: 1.4 K/UL (ref 0.9–3.6)
LYMPHOCYTES NFR BLD: 25 % (ref 21–52)
MCH RBC QN AUTO: 31.6 PG (ref 24–34)
MCHC RBC AUTO-ENTMCNC: 31.6 G/DL (ref 31–37)
MCV RBC AUTO: 99.8 FL (ref 78–100)
MONOCYTES # BLD: 0.5 K/UL (ref 0.05–1.2)
MONOCYTES NFR BLD: 8 % (ref 3–10)
NEUTS SEG # BLD: 3.5 K/UL (ref 1.8–8)
NEUTS SEG NFR BLD: 63 % (ref 40–73)
NRBC # BLD: 0 K/UL (ref 0–0.01)
NRBC BLD-RTO: 0 PER 100 WBC
PLATELET # BLD AUTO: 301 K/UL (ref 135–420)
PMV BLD AUTO: 9.3 FL (ref 9.2–11.8)
POTASSIUM SERPL-SCNC: 4.8 MMOL/L (ref 3.5–5.5)
PROT SERPL-MCNC: 7.3 G/DL (ref 6.4–8.2)
RBC # BLD AUTO: 4.34 M/UL (ref 4.2–5.3)
SODIUM SERPL-SCNC: 143 MMOL/L (ref 136–145)
TIBC SERPL-MCNC: 446 UG/DL (ref 250–450)
WBC # BLD AUTO: 5.5 K/UL (ref 4.6–13.2)

## 2022-07-01 PROCEDURE — 86300 IMMUNOASSAY TUMOR CA 15-3: CPT

## 2022-07-01 PROCEDURE — 82728 ASSAY OF FERRITIN: CPT

## 2022-07-01 PROCEDURE — 83540 ASSAY OF IRON: CPT

## 2022-07-01 PROCEDURE — 36415 COLL VENOUS BLD VENIPUNCTURE: CPT

## 2022-07-01 PROCEDURE — 85025 COMPLETE CBC W/AUTO DIFF WBC: CPT

## 2022-07-01 PROCEDURE — 80053 COMPREHEN METABOLIC PANEL: CPT

## 2022-07-02 LAB — CANCER AG27-29 SERPL-ACNC: 20.5 U/ML (ref 0–38.6)

## 2022-07-07 ENCOUNTER — HOSPITAL ENCOUNTER (OUTPATIENT)
Dept: RADIATION THERAPY | Age: 67
Discharge: HOME | End: 2022-07-07
Payer: MEDICARE

## 2022-07-07 PROCEDURE — 99212 OFFICE O/P EST SF 10 MIN: CPT | Performed by: RADIOLOGY

## 2022-07-07 PROCEDURE — 99211 OFF/OP EST MAY X REQ PHY/QHP: CPT

## 2022-07-08 ENCOUNTER — OFFICE VISIT (OUTPATIENT)
Dept: ONCOLOGY | Age: 67
End: 2022-07-08
Payer: MEDICARE

## 2022-07-08 VITALS
RESPIRATION RATE: 18 BRPM | WEIGHT: 140 LBS | HEART RATE: 71 BPM | BODY MASS INDEX: 23.9 KG/M2 | SYSTOLIC BLOOD PRESSURE: 134 MMHG | HEIGHT: 64 IN | OXYGEN SATURATION: 98 % | DIASTOLIC BLOOD PRESSURE: 78 MMHG

## 2022-07-08 DIAGNOSIS — R91.1 SOLITARY PULMONARY NODULE: ICD-10-CM

## 2022-07-08 DIAGNOSIS — C50.912 BREAST CANCER, STAGE 1, ESTROGEN RECEPTOR NEGATIVE, LEFT (HCC): ICD-10-CM

## 2022-07-08 DIAGNOSIS — Z17.1 BREAST CANCER, STAGE 1, ESTROGEN RECEPTOR NEGATIVE, LEFT (HCC): ICD-10-CM

## 2022-07-08 DIAGNOSIS — C50.919 TRIPLE NEGATIVE MALIGNANT NEOPLASM OF BREAST (HCC): Primary | ICD-10-CM

## 2022-07-08 DIAGNOSIS — D64.9 NORMOCYTIC ANEMIA: ICD-10-CM

## 2022-07-08 PROCEDURE — G9899 SCRN MAM PERF RSLTS DOC: HCPCS | Performed by: INTERNAL MEDICINE

## 2022-07-08 PROCEDURE — 1101F PT FALLS ASSESS-DOCD LE1/YR: CPT | Performed by: INTERNAL MEDICINE

## 2022-07-08 PROCEDURE — G8399 PT W/DXA RESULTS DOCUMENT: HCPCS | Performed by: INTERNAL MEDICINE

## 2022-07-08 PROCEDURE — G0463 HOSPITAL OUTPT CLINIC VISIT: HCPCS | Performed by: INTERNAL MEDICINE

## 2022-07-08 PROCEDURE — G8754 DIAS BP LESS 90: HCPCS | Performed by: INTERNAL MEDICINE

## 2022-07-08 PROCEDURE — G8536 NO DOC ELDER MAL SCRN: HCPCS | Performed by: INTERNAL MEDICINE

## 2022-07-08 PROCEDURE — G8420 CALC BMI NORM PARAMETERS: HCPCS | Performed by: INTERNAL MEDICINE

## 2022-07-08 PROCEDURE — 1090F PRES/ABSN URINE INCON ASSESS: CPT | Performed by: INTERNAL MEDICINE

## 2022-07-08 PROCEDURE — 99213 OFFICE O/P EST LOW 20 MIN: CPT | Performed by: INTERNAL MEDICINE

## 2022-07-08 PROCEDURE — 1123F ACP DISCUSS/DSCN MKR DOCD: CPT | Performed by: INTERNAL MEDICINE

## 2022-07-08 PROCEDURE — G8427 DOCREV CUR MEDS BY ELIG CLIN: HCPCS | Performed by: INTERNAL MEDICINE

## 2022-07-08 PROCEDURE — 3017F COLORECTAL CA SCREEN DOC REV: CPT | Performed by: INTERNAL MEDICINE

## 2022-07-08 PROCEDURE — G8432 DEP SCR NOT DOC, RNG: HCPCS | Performed by: INTERNAL MEDICINE

## 2022-07-08 PROCEDURE — G8752 SYS BP LESS 140: HCPCS | Performed by: INTERNAL MEDICINE

## 2022-07-08 NOTE — PROGRESS NOTES
Hematology/Medical Oncology Progress Note      Name: Renee Valdez  : 1955  DATE: 22    PCP: Kaylan Guido MD    Oncology History  Ms. Margy Guadarrama is a most pleasant 78y.o. year old female who was seen for triple negative left breast cancer. -- The patient has a past medical history significant of hypertension, hypercholesterolemia, obstructive sleep apnea. -- Postmenopausal,  2, Para 2. Menarche at 14. First birth at 24 and menopause at 63.  -- 21 Screening mammogram reported possible posterior lateral left breast mass. -- 21 Diagnostic mammogram reported left breast oval, heterogeneously hypoechoic mass with microlobulated margins at the 3:30 position, 8 cfn, measuring 0.8 x 0.7 x 0.3 cm.  -- 21 Biopsy reported invasive ductal carcinoma, grade 3, ER/AZ negative, HER2 negative. -- 21 MRI breast reported left breast mass at 3:30, 0.7 cm malignant mass (correlating to biopsied mass). -- 06/10/21 S.p Tag localized lumpectomy left breast with left sentinel lymph node biopsy with ultrasound-guided right internal jugular Mediport placement. Lumpectomy with SLNBx reported invasive ductal adenocarcinoma, grade 3, 5 mm, 0/2 lymph nodes involved, and negative margins (9 mm posterior). pT1bN0  -- Given her high graded histology and TNBC pT1bN0 she was started adjuvantly chemotherapy. -- 2021 C1 Adjuvant TC  -- 2021 C2 Adjuvant TC  -- 2021 C3 Adjuvant TC  -- 2021 C4 Adjuvant TC  -- 2021 Completed Radiation Therapy  -- Presently the patient is on surveillance. Subjective:   Ms. Renee Valdez is a 78 y.o. female who was seen for management of her TNBC. She completed her Radiation Therapy in 2021 and reported that she tolerated it well. She reports her hair is growing back and her nails are looking better. Today she reports that she relocating to [place]. She denies any fevers, chills, recurrent infections, and skin rash.  She denies shortness of breath, chest pains, nausea, vomiting, constipation, and diarrhea. She denies bleeding or bruising. She denies cough, back pain, focal numbness or weakness. She denies pain or any discomfort. She does not have any concerns or complaints to report at this time.  She denies any breast pain or discomfort     Past Medical History, Family History, and Social History reviewed and remain unchanged:    Past Medical History:   Diagnosis Date    [facility]     Cancer ([facility]     left breast    High cholesterol     History of chemotherapy     Hypertension     Radiation therapy complication     Sleep apnea     patient denies     Past Surgical History:   Procedure Laterality Date    HX BLADDER SUSPENSION      HX BREAST BIOPSY Left 6/10/2021    TAG LOCALIZED LUMPECTOMY LEFT BREAST WITH LEFT AXILLARY SENTINEL LYMPH NODE BIOPSY (TAG 6.7.21 [facility] 1300; SLNB 6.9.21 [facility] 1030) performed by Uriel Elias MD at [address] [place] [address] HX GYN      hysterectomy    HX HEENT Bilateral 05/13/2021    Bilateral Laser surgery: Anatomical narrow angle, bilateral    HX OOPHORECTOMY Left     MS BREAST SURGERY PROCEDURE UNLISTED  2000    right lumpectomy     Social History     Socioeconomic History    Marital status:      Spouse name: Not on file    Number of children: Not on file    Years of education: Not on file    Highest education level: Not on file   Occupational History    Not on file   Tobacco Use    Smoking status: Never Smoker    Smokeless tobacco: Never Used   Vaping Use    Vaping Use: Never used   Substance and Sexual Activity    Alcohol use: Yes     Comment: rare    Drug use: Never    Sexual activity: Not Currently     Partners: Male     Birth control/protection: None   Other Topics Concern    Not on file   Social History Narrative    Not on file     Social Determinants of Health     Financial Resource Strain:     Difficulty of Paying Living Expenses: Not on file   Food Insecurity:  Worried About Running Out of Food in the Last Year: Not on file    Mamadou of Food in the Last Year: Not on file   Transportation Needs:     Lack of Transportation (Medical): Not on file    Lack of Transportation (Non-Medical): Not on file   Physical Activity:     Days of Exercise per Week: Not on file    Minutes of Exercise per Session: Not on file   Stress:     Feeling of Stress : Not on file   Social Connections:     Frequency of Communication with Friends and Family: Not on file    Frequency of Social Gatherings with Friends and Family: Not on file    Attends Jain Services: Not on file    Active Member of [address] [place] [address] [employer] or Organizations: Not on file    Attends Club or Organization Meetings: Not on file    Marital Status: Not on file   Intimate Partner Violence:     Fear of Current or Ex-Partner: Not on file    Emotionally Abused: Not on file    Physically Abused: Not on file    Sexually Abused: Not on file   Housing Stability:     Unable to Pay for Housing in the Last Year: Not on file    Number of [place] in the Last Year: Not on file    Unstable Housing in the Last Year: Not on file     Family History   Problem Relation Age of Onset    Dementia Mother     Hypertension Mother     High Cholesterol Mother     Other Mother         pre-dm    No Known Problems Son     No Known Problems Son     Other Father         homicide    Other Sister         AIDS    No Known Problems Brother     COPD Sister     No Known Problems Sister      Current Outpatient Medications   Medication Sig Dispense Refill    telmisartan (MICARDIS) 20 mg tablet TAKE 1 TABLET DAILY 90 Tablet 3    methylPREDNISolone (MEDROL DOSEPACK) 4 mg tablet Use as directed. 1 Dose Pack 0    b complex vitamins tablet Take 1 Tablet by mouth daily.       fenofibrate nanocrystallized (TRICOR) 145 mg tablet TAKE 1 TABLET DAILY 90 Tablet 3    omega-3 acid ethyl esters (LOVAZA) 1 gram capsule Take 2 g by mouth daily (with breakfast).  zinc 50 mg tab tablet Take  by mouth daily.  ascorbic acid, vitamin C, (Vitamin C) 500 mg tablet Take  by mouth.  calcium citrate-vitamin d3 (CITRACAL+D) 315 mg-5 mcg (200 unit) tab Take 1 Tablet by mouth daily (with breakfast).  pilocarpine (PILOCAR) 1 % ophthalmic solution Administer 1 Drop to both eyes nightly.  coenzyme q10 (CO Q-10) 10 mg cap Take 100 mg by mouth.  multivitamin (ONE A DAY) tablet Take 1 Tab by mouth daily.  aspirin 81 mg tablet Take 81 mg by mouth daily. Review of Systems   Constitutional: Negative for chills, diaphoresis, fever, malaise/fatigue and weight loss. Respiratory: Negative for cough, hemoptysis, shortness of breath and wheezing. Cardiovascular: Negative for chest pain, palpitations and leg swelling. Gastrointestinal: Negative for abdominal pain, diarrhea, heartburn, nausea and vomiting. Genitourinary: Negative for dysuria, frequency, hematuria and urgency. Musculoskeletal: Negative for joint pain and myalgias. Skin: Negative for itching and rash. Neurological: Negative for dizziness, seizures, weakness and headaches. Psychiatric/Behavioral: Negative for depression. The patient does not have insomnia. Objective:     Visit Vitals  /78   Pulse 71   Resp 18   Ht 5' 4\" (1.626 m)   Wt 63.5 kg (140 lb)   SpO2 98%   BMI 24.03 kg/m²       ECOG Performance Status (grade): 0   0 - able to carry on all pre-disease activity w/out restriction  1 - restricted but able to carry out light work  2 - ambulatory and can self- care but unable to carry out work  3 - bed or chair >50% of waking hours  4 - completely disable, total care, confined to bed or chair    Physical Exam  Constitutional:       Appearance: Normal appearance. HENT:      Head: Normocephalic and atraumatic. Eyes:      Pupils: Pupils are equal, round, and reactive to light. Cardiovascular:      Rate and Rhythm: Normal rate and regular rhythm. Heart sounds: Normal heart sounds. Pulmonary:      Effort: Pulmonary effort is normal.      Breath sounds: Normal breath sounds. Chest:      Comments: Breast: No dimpling, discoloration, nipple inversion or retractions, No axillary or supraclavicular lymphadenopathy. No mass                Abdominal:      General: Bowel sounds are normal.      Palpations: Abdomen is soft. Tenderness: There is no abdominal tenderness. There is no guarding. Musculoskeletal:         General: Normal range of motion. Cervical back: Neck supple. Right lower leg: No edema. Left lower leg: No edema. Skin:     General: Skin is warm. Neurological:      General: No focal deficit present. Mental Status: She is alert and oriented to person, place, and time. Mental status is at baseline. Diagnostics:      No results found for this or any previous visit (from the past 96 hour(s)). Imaging:  No results found for this or any previous visit. Results for orders placed during the hospital encounter of 06/10/21    XR CHEST PORT    Narrative  ONE VIEW CHEST RADIOGRAPH    INDICATION: post op. Status post left breast lumpectomy and sentinel lymph node  biopsy with right Mediport placement. COMPARISON: Chest radiograph 5/14/2021. TECHNIQUE: AP view of the chest    FINDINGS:    LINES/TUBES: Right chest wall Mediport tip is in the SVC. MEDIASTINUM: Cardiac silhouette is within normal limits. LUNGS: Low lung volumes. No pneumothorax. Minimal left basilar atelectasis. No  evidence for pneumonia. No pleural effusion. BONES/OTHER: No acute osseous abnormality. Surgical staples and subcutaneous air  left breast and axilla consistent with recent surgery. Impression  Right chest wall Mediport tip in the SVC. No pneumothorax. Results for orders placed during the hospital encounter of 02/15/22    CT CHEST W CONT    Narrative  CT chest with contrast    HISTORY: Breast cancer.  Solitary pulmonary nodule    COMPARISON: None. TECHNIQUE: Helical scans through the chest was obtained from the thoracic inlet  to the diaphragm after uneventful nonionic IV contrast administration. All CT scans at this facility performed using dose optimization techniques as  appreciated to a performed exam, to include automated exposure control,  adjustment of the mA and or KU according to patient size (including appropriate  matching for site specific examination), or use of iterative reconstruction  technique. FINDINGS:    Lung Parenchyma: Clear. Thyroid: Unremarkable. Lymph nodes: No adenopathy. Mediastinum: The heart and the pericardium appear normal.    Vasculature: The aorta and the great vessels are unremarkable. Pleural Space And Chest Wall: No significant pleural pathology. Multiple  surgical staples noted at the lateral aspect of left breast and the chest wall. No axillar adenopathy. Imaged Upper Abdomen: Moderate hepatic steatosis. Moderate amount layering small  gallstones. Osseous Structures: Unremarkable for age. Impression  1. No CT evidence of metastasis. 2.  Postop changes from prior left breast surgery. No adenopathy. 3.  Moderate hepatic steatosis. Cholelithiasis. Thank you for your referral.      XR Results (most recent):  Results from Hospital Encounter encounter on 06/10/21    XR CHEST PORT    Narrative  ONE VIEW CHEST RADIOGRAPH    INDICATION: post op. Status post left breast lumpectomy and sentinel lymph node  biopsy with right Mediport placement. COMPARISON: Chest radiograph 5/14/2021. TECHNIQUE: AP view of the chest    FINDINGS:    LINES/TUBES: Right chest wall Mediport tip is in the SVC. MEDIASTINUM: Cardiac silhouette is within normal limits. LUNGS: Low lung volumes. No pneumothorax. Minimal left basilar atelectasis. No  evidence for pneumonia. No pleural effusion. BONES/OTHER: No acute osseous abnormality.  Surgical staples and subcutaneous air  left breast and axilla consistent with recent surgery. Impression  Right chest wall Mediport tip in the SVC. No pneumothorax. Pathology  5/4/2021     LEFT BREAST, 3:30, 8 CM FROM NIPPLE, BIOPSY:   INVASIVE DUCTAL CARCINOMA. INVASIVE CARCINOMA OF THE BREAST: Biopsy   SPECIMEN   Procedure: Needle biopsy   Specimen Laterality: Left   TUMOR   Tumor Site: Clock position - 3 o'clock   Histologic Type: Invasive carcinoma of no special type (ductal)   Glandular (Acinar) / Tubular Differentiation: Score 2   Nuclear Pleomorphism: Score 3   Mitotic Rate: Score 3   Overall Grade: Grade 3 (scores of 8 or 9)   Tumor Size: 6 mm   Ductal Carcinoma In Situ (DCIS): Not identified   Lymphovascular Invasion: Not identified   Microcalcifications: Not identified     ER/NV Results:   Estrogen Receptor (ER): Negative (0%, Internal controls present and stain as expected). Progesterone Receptor (PgR): Negative (0%, Internal controls present and stain as expected). Her-2 (IHC Method): Negative (score 1+)         Assessment:                                      Left breast cancer, Triple negative invasive ductal adenocarcinoma  Normocytic Anemia    Plan:                                        Left breast cancer, Triple negative invasive ductal adenocarcinoma   -- Diagnosed 6/24/2021 Stage IB, T1b, pN0, M0, G3, HER2 Neg, ER Neg, NV N  -- Left triple negative invasive ductal adenocarcinoma s/p lumpectomy with SLNBx (pT1bN0, grade 3, 0/2 lymph nodes involved, negative margins)  -- 03/23/21 Screening mammogram reported possible posterior lateral left breast mass. -- 04/27/21 Diagnostic mammogram reported left breast oval, heterogeneously hypoechoic mass  with microlobulated margins at the 3:30 position, 8 cfn, measuring 0.8 x 0.7 x 0.3 cm.  -- 05/04/21 Biopsy reported invasive ductal carcinoma, grade 3, ER/NV negative, HER2 negative.   -- 05/25/21 MRI breast reported left breast mass at 3:30, 0.7 cm malignant mass (correlating to biopsied mass).  -- 06/10/21 S.p Tag localized lumpectomy left breast with left sentinel lymph node biopsy with ultrasound-guided right internal jugular Mediport placement. Lumpectomy with SLNBx reported invasive ductal adenocarcinoma, grade 3, 5 mm, 0/2 lymph nodes involved, and negative margins (9 mm posterior). pT1bN0  -- Given her high graded histology and TNBC pT1bN0 she was started adjuvantly chemotherapy. -- 07/16/2021 C1 Adjuvant TC  -- 08/06/2021 C2 Adjuvant TC  -- 08/27/2021 C3 Adjuvant TC  -- 09/17/2021 C4 Adjuvant TC  -- 11/17/2021 Completed Radiation Therapy  -- Clinical stable. Recent lab report was reviewed with patient today. -- 5/2021 CXR reported nonspecific small asymmetric density at the left upper lung.   -- 02/15/2022 CT reported no CT evidence of metastasis. No adenopathy. -- 06/10/2022 US Breast reported complicated cyst. Follow-up ultrasound in 6 months is recommended. Plan:  -- She reported that she is relocating and does have Oncology/Hematology line up in [place] for a follow up . --  The patient was instructed to continue doing her breast exams on a monthly basis. -- The patient was advised to notify us if any new breast pain, new palpable nodule, nipple inversion, spontaneous nipple discharge especially if bloody, breast skin changes, unintentional weight loss, worsen fatigue, new bone pain or back pain, or any concerns. -- I have advised her to follow up her PCP to continue age-appropriate cancer screening.  -- The patient will f/u PCP/GI for hepatic steatosis. -- The patient will continue follows up with mammogram as recommended. -- She will RTC as needed. Normocytic Anemia, improved  -- 07/01/2022 : CBC showed WBC 5.5K/uL, hemoglobin 13.7g/dL with hematocrit of 43.3%. Platelet 329  -- follow up as needed     No orders of the defined types were placed in this encounter. Robbie Lau MD  7/8/2022      CC: Ranjana Anderson MD; Fiordaliza Merchant M.D.; Florencia Glass Kassandra Thompson M.D.

## 2024-07-10 ENCOUNTER — APPOINTMENT (OUTPATIENT)
Dept: URBAN - METROPOLITAN AREA CLINIC 305 | Age: 69
Setting detail: DERMATOLOGY
End: 2024-07-15

## 2024-07-10 DIAGNOSIS — Z71.89 OTHER SPECIFIED COUNSELING: ICD-10-CM

## 2024-07-10 DIAGNOSIS — L81.4 OTHER MELANIN HYPERPIGMENTATION: ICD-10-CM

## 2024-07-10 DIAGNOSIS — L82.1 OTHER SEBORRHEIC KERATOSIS: ICD-10-CM

## 2024-07-10 DIAGNOSIS — L90.8 OTHER ATROPHIC DISORDERS OF SKIN: ICD-10-CM

## 2024-07-10 PROCEDURE — 99203 OFFICE O/P NEW LOW 30 MIN: CPT

## 2024-07-10 PROCEDURE — OTHER OBSERVATION: OTHER

## 2024-07-10 PROCEDURE — OTHER COUNSELING: OTHER

## 2024-07-10 PROCEDURE — OTHER PRESCRIPTION: OTHER

## 2024-07-10 PROCEDURE — OTHER REASSURANCE: OTHER

## 2024-07-10 PROCEDURE — OTHER SUNSCREEN RECOMMENDATIONS: OTHER

## 2024-07-10 PROCEDURE — OTHER ADDITIONAL NOTES: OTHER

## 2024-07-10 RX ORDER — TRETIONIN 1 MG/G
CREAM TOPICAL
Qty: 20 | Refills: 2 | Status: ERX | COMMUNITY
Start: 2024-07-10

## 2024-07-10 ASSESSMENT — LOCATION DETAILED DESCRIPTION DERM
LOCATION DETAILED: RIGHT PROXIMAL DORSAL FOREARM
LOCATION DETAILED: LEFT DISTAL DORSAL FOREARM
LOCATION DETAILED: LEFT PROXIMAL DORSAL FOREARM
LOCATION DETAILED: NASAL DORSUM
LOCATION DETAILED: LEFT CENTRAL MALAR CHEEK
LOCATION DETAILED: LEFT INFERIOR CENTRAL MALAR CHEEK
LOCATION DETAILED: RIGHT CENTRAL MALAR CHEEK
LOCATION DETAILED: LEFT CENTRAL BUCCAL CHEEK
LOCATION DETAILED: LEFT SUPERIOR LATERAL MALAR CHEEK
LOCATION DETAILED: RIGHT CENTRAL TEMPLE
LOCATION DETAILED: RIGHT DISTAL DORSAL FOREARM
LOCATION DETAILED: RIGHT INFERIOR TEMPLE
LOCATION DETAILED: LEFT MID TEMPLE
LOCATION DETAILED: RIGHT INFERIOR CENTRAL MALAR CHEEK
LOCATION DETAILED: RIGHT SUPERIOR LATERAL MALAR CHEEK
LOCATION DETAILED: RIGHT CENTRAL ZYGOMA
LOCATION DETAILED: RIGHT NASAL SIDEWALL
LOCATION DETAILED: RIGHT INFERIOR LATERAL MALAR CHEEK
LOCATION DETAILED: LEFT MEDIAL MALAR CHEEK
LOCATION DETAILED: RIGHT LATERAL MALAR CHEEK

## 2024-07-10 ASSESSMENT — LOCATION SIMPLE DESCRIPTION DERM
LOCATION SIMPLE: LEFT TEMPLE
LOCATION SIMPLE: RIGHT ZYGOMA
LOCATION SIMPLE: LEFT FOREARM
LOCATION SIMPLE: NOSE
LOCATION SIMPLE: RIGHT CHEEK
LOCATION SIMPLE: RIGHT FOREARM
LOCATION SIMPLE: RIGHT NOSE
LOCATION SIMPLE: LEFT CHEEK
LOCATION SIMPLE: RIGHT TEMPLE

## 2024-07-10 ASSESSMENT — LOCATION ZONE DERM
LOCATION ZONE: ARM
LOCATION ZONE: NOSE
LOCATION ZONE: FACE

## 2024-07-10 NOTE — PROCEDURE: ADDITIONAL NOTES
Render Risk Assessment In Note?: no
Detail Level: Simple
Additional Notes: Recommended consultation with [facility] in 6-8 weeks.

## 2024-08-22 ENCOUNTER — APPOINTMENT (OUTPATIENT)
Dept: URBAN - METROPOLITAN AREA CLINIC 305 | Age: 69
Setting detail: DERMATOLOGY
End: 2024-08-22

## 2024-08-22 NOTE — HPI: COSMETIC CONSULTATION
Additional History: Patient came in for follow up after being prescribed tretinoin .1% through [facility]. Patient stated that she is not seeing much results. Explain to patient. It typically takes a little longer for tretinoin to lighten the skin. Patient is experiencing peeling and explain to patient that was normal. Recommended pigment control and blending at night. Two pumps mix with equal parts [place]. Also discussed peels in the fall in winter. Patient stated that she is in remission from having breast cancer and has been in remission for two years. Patient stated that she went through chemotherapy and radiation. Explained to patient that going through chemo and radiation could have Attributed to the pigment. Patient understood gave patient sample of exfoliating polish.

## (undated) DEVICE — SUTURE PERMA-HAND 3-0 L30IN NONABSORBABLE BLK ST-1 L45MM K852H

## (undated) DEVICE — PACK PROCEDURE SURG MAJ W/ BASIN LF

## (undated) DEVICE — SHEET, DRAPE, SPLIT, STERILE: Brand: MEDLINE

## (undated) DEVICE — KIT CLN UP BON SECOURS MARYV

## (undated) DEVICE — APPLIER CLP L9.38IN M LIG TI DISP STR RNG HNDL LIGACLP

## (undated) DEVICE — GDWIRE TAPR STR 0.035INX180CM --

## (undated) DEVICE — INTENDED FOR TISSUE SEPARATION, AND OTHER PROCEDURES THAT REQUIRE A SHARP SURGICAL BLADE TO PUNCTURE OR CUT.: Brand: BARD-PARKER SAFETY BLADES SIZE 10, STERILE

## (undated) DEVICE — GLOVE SURG BIOGEL 8.0 STRL -- SKINSENSE

## (undated) DEVICE — SMOKE EVACUATION PENCIL: Brand: VALLEYLAB

## (undated) DEVICE — REM POLYHESIVE ADULT PATIENT RETURN ELECTRODE: Brand: VALLEYLAB

## (undated) DEVICE — BLANKET WRM AD W50XL85.8IN PACU FULL BODY FORC AIR

## (undated) DEVICE — DRAPE,TOP,102X53,STERILE: Brand: MEDLINE

## (undated) DEVICE — SOLUTION IV 1000ML 0.9% SOD CHL

## (undated) DEVICE — SUTURE VCRL SZ 2-0 L36IN ABSRB UD L36MM CT-1 1/2 CIR J945H

## (undated) DEVICE — SYR 10ML LUER LOK 1/5ML GRAD --

## (undated) DEVICE — SOFT SILICONE HYDROCELLULAR SACRUM DRESSING WITH LOCK AWAY LAYER: Brand: ALLEVYN LIFE SACRUM (LARGE) PACK OF 10

## (undated) DEVICE — SUTURE VCRL SZ 3-0 L18IN ABSRB UD POLYGLACTIN 910 BRAID TIE J910T

## (undated) DEVICE — 3M™ STERI-STRIP™ REINFORCED ADHESIVE SKIN CLOSURES, R1549, 1/2 IN X 2 IN (12 MM X 50 MM), 6 STRIPS/ENVELOPE: Brand: 3M™ STERI-STRIP™

## (undated) DEVICE — INTENDED FOR TISSUE SEPARATION, AND OTHER PROCEDURES THAT REQUIRE A SHARP SURGICAL BLADE TO PUNCTURE OR CUT.: Brand: BARD-PARKER SAFETY BLADES SIZE 15, STERILE

## (undated) DEVICE — SHEAR HARMONIC FOCUS OEM 9CM --

## (undated) DEVICE — 3M™ TEGADERM™ TRANSPARENT FILM DRESSING FRAME STYLE, 1626W, 4 IN X 4-3/4 IN (10 CM X 12 CM), 50/CT 4CT/CASE: Brand: 3M™ TEGADERM™

## (undated) DEVICE — 48" PROBE COVER W/GEL, ULTRASOUND, STERILE: Brand: SITE-RITE

## (undated) DEVICE — SUTURE VCRL SZ 4-0 L18IN ABSRB UD L19MM PS-2 3/8 CIR PRIM J496H

## (undated) DEVICE — SHEET, T, LAPAROTOMY, STERILE: Brand: MEDLINE

## (undated) DEVICE — STERILE POLYISOPRENE POWDER-FREE SURGICAL GLOVES: Brand: PROTEXIS

## (undated) DEVICE — GLOVE SURG SZ 8 L11.77IN FNGR THK9.8MIL STRW LTX POLYMER

## (undated) DEVICE — GAUZE,SPONGE,4"X4",12PLY,STERILE,LF,2'S: Brand: MEDLINE

## (undated) DEVICE — SUTURE MCRYL SZ 4-0 L27IN ABSRB UD L24MM PS-1 3/8 CIR PRIM Y935H

## (undated) DEVICE — PREP SKN CHLRAPRP APL 26ML STR --

## (undated) DEVICE — 3M™ STERI-STRIP™ COMPOUND BENZOIN TINCTURE 40 BAGS/CARTON 4 CARTONS/CASE C1544: Brand: 3M™ STERI-STRIP™

## (undated) DEVICE — GDWIRE URET STR STD .038X150 -- ZIPWIRE STD

## (undated) DEVICE — STERILE LATEX POWDER-FREE SURGICAL GLOVESWITH NITRILE COATING: Brand: PROTEXIS

## (undated) DEVICE — GARMENT,MEDLINE,DVT,INT,CALF,MED, GEN2: Brand: MEDLINE

## (undated) DEVICE — PROBE SET W/ DRP

## (undated) DEVICE — SUT SLK 2-0SH 30IN BLK --

## (undated) DEVICE — SUTURE VCRL SZ 3-0 L27IN ABSRB UD L26MM SH 1/2 CIR J416H

## (undated) DEVICE — SUTURE VCRL SZ 3-0 L27IN ABSRB UD L36MM CT-1 1/2 CIR J258H

## (undated) DEVICE — RADIFOCUS GLIDEWIRE: Brand: GLIDEWIRE

## (undated) DEVICE — DECANTER BAG 9": Brand: MEDLINE INDUSTRIES, INC.

## (undated) DEVICE — THREE-QUARTER SHEET: Brand: CONVERTORS

## (undated) DEVICE — 4-PORT MANIFOLD: Brand: NEPTUNE 2

## (undated) DEVICE — INTENDED FOR TISSUE SEPARATION, AND OTHER PROCEDURES THAT REQUIRE A SHARP SURGICAL BLADE TO PUNCTURE OR CUT.: Brand: BARD-PARKER ®  SAFETY SCALPED

## (undated) DEVICE — MASK AERO PED UNIV CLR VYN ADJ NOSE CLP E STRP SHT STYL W/O

## (undated) DEVICE — SINGLE PORT MANIFOLD: Brand: NEPTUNE 2